# Patient Record
Sex: FEMALE | Race: WHITE | NOT HISPANIC OR LATINO | URBAN - METROPOLITAN AREA
[De-identification: names, ages, dates, MRNs, and addresses within clinical notes are randomized per-mention and may not be internally consistent; named-entity substitution may affect disease eponyms.]

---
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
## 2017-07-11 ENCOUNTER — OUTPATIENT (OUTPATIENT)
Dept: OUTPATIENT SERVICES | Facility: HOSPITAL | Age: 22
LOS: 1 days | Discharge: HOME | End: 2017-07-11

## 2017-07-19 DIAGNOSIS — F33.1 MAJOR DEPRESSIVE DISORDER, RECURRENT, MODERATE: ICD-10-CM

## 2017-08-09 ENCOUNTER — APPOINTMENT (OUTPATIENT)
Dept: UROLOGY | Facility: CLINIC | Age: 22
End: 2017-08-09
Payer: COMMERCIAL

## 2017-08-09 VITALS
OXYGEN SATURATION: 96 % | DIASTOLIC BLOOD PRESSURE: 62 MMHG | BODY MASS INDEX: 29.02 KG/M2 | HEART RATE: 87 BPM | HEIGHT: 64 IN | SYSTOLIC BLOOD PRESSURE: 98 MMHG | WEIGHT: 170 LBS

## 2017-08-09 DIAGNOSIS — N39.44 NOCTURNAL ENURESIS: ICD-10-CM

## 2017-08-09 DIAGNOSIS — Z72.3 LACK OF PHYSICAL EXERCISE: ICD-10-CM

## 2017-08-09 DIAGNOSIS — R35.1 NOCTURIA: ICD-10-CM

## 2017-08-09 DIAGNOSIS — E28.2 POLYCYSTIC OVARIAN SYNDROME: ICD-10-CM

## 2017-08-09 PROCEDURE — 99203 OFFICE O/P NEW LOW 30 MIN: CPT

## 2017-08-11 LAB
APPEARANCE: CLEAR
BACTERIA: NEGATIVE
BILIRUBIN URINE: NEGATIVE
BLOOD URINE: NEGATIVE
COLOR: YELLOW
GLUCOSE QUALITATIVE U: NORMAL MG/DL
HYALINE CASTS: 0 /LPF
KETONES URINE: NEGATIVE
LEUKOCYTE ESTERASE URINE: NEGATIVE
MICROSCOPIC-UA: NORMAL
NITRITE URINE: NEGATIVE
PH URINE: 8
PROTEIN URINE: ABNORMAL MG/DL
RED BLOOD CELLS URINE: 4 /HPF
SPECIFIC GRAVITY URINE: 1.01
SQUAMOUS EPITHELIAL CELLS: 1 /HPF
UROBILINOGEN URINE: NORMAL MG/DL
WHITE BLOOD CELLS URINE: 1 /HPF

## 2017-08-14 ENCOUNTER — RESULT REVIEW (OUTPATIENT)
Age: 22
End: 2017-08-14

## 2017-08-28 ENCOUNTER — APPOINTMENT (OUTPATIENT)
Dept: DERMATOLOGY | Facility: CLINIC | Age: 22
End: 2017-08-28
Payer: COMMERCIAL

## 2017-08-28 VITALS
DIASTOLIC BLOOD PRESSURE: 60 MMHG | BODY MASS INDEX: 29.02 KG/M2 | SYSTOLIC BLOOD PRESSURE: 102 MMHG | HEIGHT: 64 IN | WEIGHT: 170 LBS

## 2017-08-28 DIAGNOSIS — F41.8 OTHER SPECIFIED ANXIETY DISORDERS: ICD-10-CM

## 2017-08-28 DIAGNOSIS — L30.9 DERMATITIS, UNSPECIFIED: ICD-10-CM

## 2017-08-28 DIAGNOSIS — Z87.42 PERSONAL HISTORY OF OTHER DISEASES OF THE FEMALE GENITAL TRACT: ICD-10-CM

## 2017-08-28 PROCEDURE — 99243 OFF/OP CNSLTJ NEW/EST LOW 30: CPT | Mod: GC

## 2017-09-04 ENCOUNTER — TRANSCRIPTION ENCOUNTER (OUTPATIENT)
Age: 22
End: 2017-09-04

## 2017-09-25 ENCOUNTER — LABORATORY RESULT (OUTPATIENT)
Age: 22
End: 2017-09-25

## 2017-09-25 ENCOUNTER — RESULT REVIEW (OUTPATIENT)
Age: 22
End: 2017-09-25

## 2017-09-26 ENCOUNTER — APPOINTMENT (OUTPATIENT)
Dept: DERMATOLOGY | Facility: CLINIC | Age: 22
End: 2017-09-26
Payer: COMMERCIAL

## 2017-09-26 PROCEDURE — 12011 RPR F/E/E/N/L/M 2.5 CM/<: CPT | Mod: 59

## 2017-09-26 PROCEDURE — 11442 EXC FACE-MM B9+MARG 1.1-2 CM: CPT

## 2017-09-29 ENCOUNTER — TRANSCRIPTION ENCOUNTER (OUTPATIENT)
Age: 22
End: 2017-09-29

## 2017-10-05 ENCOUNTER — APPOINTMENT (OUTPATIENT)
Dept: SURGERY | Facility: CLINIC | Age: 22
End: 2017-10-05
Payer: COMMERCIAL

## 2017-10-05 VITALS
SYSTOLIC BLOOD PRESSURE: 100 MMHG | HEIGHT: 64 IN | TEMPERATURE: 99.1 F | BODY MASS INDEX: 29.02 KG/M2 | DIASTOLIC BLOOD PRESSURE: 67 MMHG | OXYGEN SATURATION: 99 % | RESPIRATION RATE: 16 BRPM | HEART RATE: 93 BPM | WEIGHT: 170 LBS

## 2017-10-05 PROCEDURE — 10081 I&D PILONIDAL CYST COMP: CPT

## 2017-10-05 PROCEDURE — 12021 TX SUPFC WND DEHSN W/PACKING: CPT | Mod: 59

## 2017-10-05 PROCEDURE — 99243 OFF/OP CNSLTJ NEW/EST LOW 30: CPT | Mod: 25

## 2017-10-05 RX ORDER — ERGOCALCIFEROL 1.25 MG/1
1.25 MG CAPSULE, LIQUID FILLED ORAL
Qty: 4 | Refills: 0 | Status: DISCONTINUED | COMMUNITY
Start: 2017-07-13 | End: 2017-10-05

## 2017-10-05 RX ORDER — CALCIUM CARBONATE 500 MG/1
TABLET, CHEWABLE ORAL
Refills: 0 | Status: DISCONTINUED | COMMUNITY
End: 2017-10-05

## 2017-10-05 RX ORDER — ERGOCALCIFEROL 1.25 MG/1
1.25 MG CAPSULE ORAL
Refills: 0 | Status: DISCONTINUED | COMMUNITY
End: 2017-10-05

## 2017-10-05 RX ORDER — DOXYCYCLINE HYCLATE 100 MG/1
100 TABLET ORAL
Qty: 60 | Refills: 0 | Status: DISCONTINUED | COMMUNITY
Start: 2017-06-29 | End: 2017-10-05

## 2017-10-05 RX ORDER — METHYLPHENIDATE HYDROCHLORIDE 10 MG/1
10 TABLET ORAL
Refills: 0 | Status: DISCONTINUED | COMMUNITY
End: 2017-10-05

## 2017-10-05 RX ORDER — CLOZAPINE 25 MG/1
25 TABLET ORAL
Qty: 15 | Refills: 0 | Status: DISCONTINUED | COMMUNITY
Start: 2017-07-11 | End: 2017-10-05

## 2017-10-05 RX ORDER — ERYTHROMYCIN AND BENZOYL PEROXIDE 3 %-5 %
5-3 KIT TOPICAL
Qty: 47 | Refills: 0 | Status: DISCONTINUED | COMMUNITY
Start: 2017-08-01 | End: 2017-10-05

## 2017-10-05 RX ORDER — TRETINOIN 0.25 MG/G
0.03 CREAM TOPICAL
Qty: 45 | Refills: 0 | Status: DISCONTINUED | COMMUNITY
Start: 2017-06-30 | End: 2017-10-05

## 2017-10-05 RX ORDER — BISACODYL 5 MG/1
5 TABLET, DELAYED RELEASE ORAL
Refills: 0 | Status: DISCONTINUED | COMMUNITY
End: 2017-10-05

## 2017-10-05 RX ORDER — TRAZODONE HYDROCHLORIDE 100 MG/1
100 TABLET ORAL
Qty: 90 | Refills: 0 | Status: DISCONTINUED | COMMUNITY
Start: 2017-07-14 | End: 2017-10-05

## 2017-10-05 RX ORDER — MULTIVITAMIN
TABLET ORAL
Refills: 0 | Status: DISCONTINUED | COMMUNITY
End: 2017-10-05

## 2017-10-05 RX ORDER — DOCUSATE SODIUM 100 MG/1
100 CAPSULE, LIQUID FILLED ORAL
Refills: 0 | Status: DISCONTINUED | COMMUNITY
End: 2017-10-05

## 2017-10-05 RX ORDER — SULFACETAMIDE SODIUM/UREA 10 %-10 %
LOTION (GRAM) TOPICAL
Refills: 0 | Status: DISCONTINUED | COMMUNITY
End: 2017-10-05

## 2017-10-05 RX ORDER — CLOZAPINE 100 MG/1
100 TABLET ORAL
Qty: 45 | Refills: 0 | Status: DISCONTINUED | COMMUNITY
Start: 2017-07-11 | End: 2017-10-05

## 2017-10-05 RX ORDER — LUBIPROSTONE 24 UG/1
24 CAPSULE, GELATIN COATED ORAL
Qty: 60 | Refills: 0 | Status: DISCONTINUED | COMMUNITY
Start: 2017-06-30 | End: 2017-10-05

## 2017-10-05 RX ORDER — HYDROCORTISONE 25 MG/G
2.5 OINTMENT TOPICAL
Qty: 1 | Refills: 3 | Status: DISCONTINUED | COMMUNITY
Start: 2017-08-28 | End: 2017-10-05

## 2017-10-06 ENCOUNTER — OUTPATIENT (OUTPATIENT)
Dept: OUTPATIENT SERVICES | Facility: HOSPITAL | Age: 22
LOS: 1 days | Discharge: HOME | End: 2017-10-06

## 2017-10-06 DIAGNOSIS — F33.1 MAJOR DEPRESSIVE DISORDER, RECURRENT, MODERATE: ICD-10-CM

## 2017-10-19 ENCOUNTER — APPOINTMENT (OUTPATIENT)
Dept: SURGERY | Facility: CLINIC | Age: 22
End: 2017-10-19
Payer: COMMERCIAL

## 2017-10-19 VITALS
SYSTOLIC BLOOD PRESSURE: 109 MMHG | RESPIRATION RATE: 15 BRPM | TEMPERATURE: 98.3 F | HEART RATE: 100 BPM | OXYGEN SATURATION: 100 % | DIASTOLIC BLOOD PRESSURE: 75 MMHG

## 2017-10-19 PROCEDURE — 99213 OFFICE O/P EST LOW 20 MIN: CPT

## 2017-11-16 ENCOUNTER — APPOINTMENT (OUTPATIENT)
Dept: SURGERY | Facility: CLINIC | Age: 22
End: 2017-11-16
Payer: COMMERCIAL

## 2017-11-16 VITALS
TEMPERATURE: 98.7 F | HEART RATE: 102 BPM | OXYGEN SATURATION: 98 % | DIASTOLIC BLOOD PRESSURE: 71 MMHG | RESPIRATION RATE: 16 BRPM | SYSTOLIC BLOOD PRESSURE: 101 MMHG

## 2017-11-16 DIAGNOSIS — L05.01 PILONIDAL CYST WITH ABSCESS: ICD-10-CM

## 2017-11-16 PROCEDURE — 99213 OFFICE O/P EST LOW 20 MIN: CPT

## 2017-11-16 RX ORDER — ONDANSETRON 4 MG/1
4 TABLET ORAL
Qty: 30 | Refills: 0 | Status: DISCONTINUED | COMMUNITY
Start: 2017-08-29

## 2017-11-16 RX ORDER — AMOXICILLIN AND CLAVULANATE POTASSIUM 875; 125 MG/1; MG/1
875-125 TABLET, COATED ORAL
Qty: 20 | Refills: 0 | Status: DISCONTINUED | COMMUNITY
Start: 2017-09-02

## 2017-11-16 RX ORDER — METHYLPHENIDATE HYDROCHLORIDE 5 MG/1
5 TABLET ORAL
Qty: 30 | Refills: 0 | Status: DISCONTINUED | COMMUNITY
Start: 2017-08-22

## 2017-11-16 RX ORDER — IBUPROFEN 800 MG/1
800 TABLET, FILM COATED ORAL
Qty: 6 | Refills: 0 | Status: DISCONTINUED | COMMUNITY
Start: 2017-09-02

## 2017-11-16 RX ORDER — SODIUM SULFACETAMIDE 100 MG/ML
10 LOTION TOPICAL
Qty: 118 | Refills: 0 | Status: DISCONTINUED | COMMUNITY
Start: 2017-08-15

## 2017-11-16 RX ORDER — OXYCODONE AND ACETAMINOPHEN 5; 325 MG/1; MG/1
5-325 TABLET ORAL
Qty: 8 | Refills: 0 | Status: DISCONTINUED | COMMUNITY
Start: 2017-09-02

## 2017-11-16 RX ORDER — SULFAMETHOXAZOLE AND TRIMETHOPRIM 800; 160 MG/1; MG/1
800-160 TABLET ORAL
Qty: 14 | Refills: 0 | Status: DISCONTINUED | COMMUNITY
Start: 2017-09-02

## 2018-02-27 ENCOUNTER — APPOINTMENT (OUTPATIENT)
Dept: DERMATOLOGY | Facility: CLINIC | Age: 23
End: 2018-02-27
Payer: COMMERCIAL

## 2018-02-27 VITALS — DIASTOLIC BLOOD PRESSURE: 78 MMHG | SYSTOLIC BLOOD PRESSURE: 110 MMHG

## 2018-02-27 PROCEDURE — 99214 OFFICE O/P EST MOD 30 MIN: CPT

## 2018-05-29 ENCOUNTER — APPOINTMENT (OUTPATIENT)
Dept: DERMATOLOGY | Facility: CLINIC | Age: 23
End: 2018-05-29
Payer: COMMERCIAL

## 2018-05-29 VITALS — SYSTOLIC BLOOD PRESSURE: 110 MMHG | DIASTOLIC BLOOD PRESSURE: 68 MMHG

## 2018-05-29 PROCEDURE — 99213 OFFICE O/P EST LOW 20 MIN: CPT | Mod: 25,GC

## 2018-05-29 PROCEDURE — 11900 INJECT SKIN LESIONS </W 7: CPT | Mod: GC

## 2018-08-08 ENCOUNTER — APPOINTMENT (OUTPATIENT)
Dept: DERMATOLOGY | Facility: CLINIC | Age: 23
End: 2018-08-08

## 2018-08-10 ENCOUNTER — APPOINTMENT (OUTPATIENT)
Dept: DERMATOLOGY | Facility: CLINIC | Age: 23
End: 2018-08-10
Payer: COMMERCIAL

## 2018-08-10 VITALS — DIASTOLIC BLOOD PRESSURE: 64 MMHG | SYSTOLIC BLOOD PRESSURE: 106 MMHG

## 2018-08-10 PROCEDURE — 99213 OFFICE O/P EST LOW 20 MIN: CPT

## 2018-08-14 ENCOUNTER — APPOINTMENT (OUTPATIENT)
Dept: ORTHOPEDIC SURGERY | Facility: CLINIC | Age: 23
End: 2018-08-14
Payer: COMMERCIAL

## 2018-08-14 VITALS
DIASTOLIC BLOOD PRESSURE: 74 MMHG | BODY MASS INDEX: 27.63 KG/M2 | SYSTOLIC BLOOD PRESSURE: 107 MMHG | HEART RATE: 111 BPM | WEIGHT: 154 LBS | HEIGHT: 62.5 IN

## 2018-08-14 DIAGNOSIS — M54.9 DORSALGIA, UNSPECIFIED: ICD-10-CM

## 2018-08-14 DIAGNOSIS — F31.9 BIPOLAR DISORDER, UNSPECIFIED: ICD-10-CM

## 2018-08-14 DIAGNOSIS — Z87.19 PERSONAL HISTORY OF OTHER DISEASES OF THE DIGESTIVE SYSTEM: ICD-10-CM

## 2018-08-14 DIAGNOSIS — Z86.39 PERSONAL HISTORY OF OTHER ENDOCRINE, NUTRITIONAL AND METABOLIC DISEASE: ICD-10-CM

## 2018-08-14 DIAGNOSIS — M41.124 ADOLESCENT IDIOPATHIC SCOLIOSIS, THORACIC REGION: ICD-10-CM

## 2018-08-14 DIAGNOSIS — M40.209 UNSPECIFIED KYPHOSIS, SITE UNSPECIFIED: ICD-10-CM

## 2018-08-14 PROCEDURE — 99204 OFFICE O/P NEW MOD 45 MIN: CPT

## 2018-08-14 PROCEDURE — 72082 X-RAY EXAM ENTIRE SPI 2/3 VW: CPT

## 2018-09-04 ENCOUNTER — APPOINTMENT (OUTPATIENT)
Dept: DERMATOLOGY | Facility: CLINIC | Age: 23
End: 2018-09-04

## 2018-10-08 ENCOUNTER — APPOINTMENT (OUTPATIENT)
Dept: DERMATOLOGY | Facility: CLINIC | Age: 23
End: 2018-10-08
Payer: COMMERCIAL

## 2018-10-08 PROCEDURE — 99213 OFFICE O/P EST LOW 20 MIN: CPT

## 2018-12-03 ENCOUNTER — APPOINTMENT (OUTPATIENT)
Dept: DERMATOLOGY | Facility: CLINIC | Age: 23
End: 2018-12-03

## 2018-12-28 ENCOUNTER — APPOINTMENT (OUTPATIENT)
Dept: DERMATOLOGY | Facility: CLINIC | Age: 23
End: 2018-12-28
Payer: COMMERCIAL

## 2018-12-28 VITALS — SYSTOLIC BLOOD PRESSURE: 90 MMHG | DIASTOLIC BLOOD PRESSURE: 60 MMHG

## 2018-12-28 PROCEDURE — 99214 OFFICE O/P EST MOD 30 MIN: CPT

## 2019-01-07 ENCOUNTER — LABORATORY RESULT (OUTPATIENT)
Age: 24
End: 2019-01-07

## 2019-01-07 ENCOUNTER — APPOINTMENT (OUTPATIENT)
Dept: DERMATOLOGY | Facility: CLINIC | Age: 24
End: 2019-01-07
Payer: COMMERCIAL

## 2019-01-07 PROCEDURE — 11421 EXC H-F-NK-SP B9+MARG 0.6-1: CPT

## 2019-01-07 PROCEDURE — 12031 INTMD RPR S/A/T/EXT 2.5 CM/<: CPT

## 2019-01-08 NOTE — PHYSICAL EXAM
[Alert] : alert [Oriented x 3] : ~L oriented x 3 [Well Nourished] : well nourished [Conjunctiva Non-injected] : conjunctiva non-injected [No Visual Lymphadenopathy] : no visual  lymphadenopathy [No Clubbing] : no clubbing [No Edema] : no edema [No Bromhidrosis] : no bromhidrosis [No Chromhidrosis] : no chromhidrosis [FreeTextEntry3] : right frontal scalp: 0.5cm x 1cm scar\par \par

## 2019-01-08 NOTE — HISTORY OF PRESENT ILLNESS
[FreeTextEntry1] : f/u cyst on scalp [de-identified] : Date of surgery: 01/07/2019\par \par 24 yo F with bipolar disorder on lithium and PCOS on OCPs here today for repeat excision of cyst on R frontal scalp.  Had excision by Dr. Zhou in September 2017. Now w/ recurrence, per pt.  No associated signs or symptoms.  Desires removal. \par  \par Pertinent positives noted below\par History of HIV or hepatitis: N\par Blood thinners: N\par Antibiotic Prophylaxis: N \par Medical implants: N\par The patient's ROS questionnaire was reviewed. Education needs were identified. There were no barriers to learning.\par \par \par

## 2019-01-14 ENCOUNTER — APPOINTMENT (OUTPATIENT)
Dept: DERMATOLOGY | Facility: CLINIC | Age: 24
End: 2019-01-14
Payer: COMMERCIAL

## 2019-01-14 PROCEDURE — 99024 POSTOP FOLLOW-UP VISIT: CPT

## 2019-01-14 NOTE — HISTORY OF PRESENT ILLNESS
[FreeTextEntry1] : SR s/p recurrent cyst on scalp  [de-identified] : Date of surgery: 01/07/2019\par \par 22 yo F with bipolar disorder on lithium and PCOS on OCPs here today for SR s/p repeat excision of "cyst" on R frontal scalp. Path c/w scar. Initial excision by Dr. Zhou in September 2017.\par  \par Pertinent positives noted below\par History of HIV or hepatitis: N\par Blood thinners: N\par Antibiotic Prophylaxis: N \par Medical implants: N\par The patient's ROS questionnaire was reviewed. Education needs were identified. There were no barriers to learning.

## 2019-01-14 NOTE — PHYSICAL EXAM
[Alert] : alert [Oriented x 3] : ~L oriented x 3 [Well Nourished] : well nourished [Conjunctiva Non-injected] : conjunctiva non-injected [No Visual Lymphadenopathy] : no visual  lymphadenopathy [No Clubbing] : no clubbing [No Edema] : no edema [No Bromhidrosis] : no bromhidrosis [No Chromhidrosis] : no chromhidrosis [FreeTextEntry3] : right frontal scalp: 0.5cm x 1cm scar. sutures intact\par \par

## 2019-02-12 ENCOUNTER — APPOINTMENT (OUTPATIENT)
Dept: DERMATOLOGY | Facility: CLINIC | Age: 24
End: 2019-02-12
Payer: COMMERCIAL

## 2019-02-12 DIAGNOSIS — L72.9 FOLLICULAR CYST OF THE SKIN AND SUBCUTANEOUS TISSUE, UNSPECIFIED: ICD-10-CM

## 2019-02-12 DIAGNOSIS — T81.30XA DISRUPTION OF WOUND, UNSPECIFIED, INITIAL ENCOUNTER: ICD-10-CM

## 2019-02-12 PROCEDURE — 99213 OFFICE O/P EST LOW 20 MIN: CPT

## 2019-03-14 ENCOUNTER — APPOINTMENT (OUTPATIENT)
Dept: DERMATOLOGY | Facility: CLINIC | Age: 24
End: 2019-03-14
Payer: COMMERCIAL

## 2019-03-14 VITALS — SYSTOLIC BLOOD PRESSURE: 100 MMHG | DIASTOLIC BLOOD PRESSURE: 64 MMHG

## 2019-03-14 PROCEDURE — 99213 OFFICE O/P EST LOW 20 MIN: CPT

## 2019-03-15 ENCOUNTER — TRANSCRIPTION ENCOUNTER (OUTPATIENT)
Age: 24
End: 2019-03-15

## 2019-04-02 ENCOUNTER — APPOINTMENT (OUTPATIENT)
Dept: DERMATOLOGY | Facility: CLINIC | Age: 24
End: 2019-04-02
Payer: COMMERCIAL

## 2019-04-02 DIAGNOSIS — L90.5 SCAR CONDITIONS AND FIBROSIS OF SKIN: ICD-10-CM

## 2019-04-02 PROCEDURE — 99213 OFFICE O/P EST LOW 20 MIN: CPT

## 2019-04-16 ENCOUNTER — APPOINTMENT (OUTPATIENT)
Dept: DERMATOLOGY | Facility: CLINIC | Age: 24
End: 2019-04-16
Payer: COMMERCIAL

## 2019-04-16 VITALS — DIASTOLIC BLOOD PRESSURE: 70 MMHG | SYSTOLIC BLOOD PRESSURE: 110 MMHG

## 2019-04-16 PROCEDURE — 99213 OFFICE O/P EST LOW 20 MIN: CPT

## 2019-04-16 NOTE — PHYSICAL EXAM
[Alert] : alert [Oriented x 3] : ~L oriented x 3 [Well Nourished] : well nourished [Conjunctiva Non-injected] : conjunctiva non-injected [No Visual Lymphadenopathy] : no visual  lymphadenopathy [No Clubbing] : no clubbing [No Edema] : no edema [No Bromhidrosis] : no bromhidrosis [No Chromhidrosis] : no chromhidrosis [FreeTextEntry3] : open and closed comedones on the face with few inflammatory papules on the jawline (L>R)\par \par Few erythematous papules on the chest\par \par \par \par

## 2019-04-16 NOTE — HISTORY OF PRESENT ILLNESS
[FreeTextEntry1] : f/u acne [de-identified] : 22 yo F with bipolar disorder on lithium and PCOS on OCPs here today for f/u of acne. Improved overall with tretinoin but has recently been getting bumps on the jawline despite regular use of tretinoin. Was on Dapsone topically but stopped as she felt it was making her skin dry in the winter time. Was previously on spironolactone as well. Otherwise, no new, evolving, or symptomatic skin lesions. \par \par \par Acne history: Previously treated with PO doxycycline with resolution\par

## 2019-07-16 ENCOUNTER — APPOINTMENT (OUTPATIENT)
Age: 24
End: 2019-07-16
Payer: COMMERCIAL

## 2019-07-16 VITALS
BODY MASS INDEX: 26.63 KG/M2 | SYSTOLIC BLOOD PRESSURE: 96 MMHG | DIASTOLIC BLOOD PRESSURE: 60 MMHG | WEIGHT: 156 LBS | HEIGHT: 64 IN

## 2019-07-16 PROCEDURE — 99213 OFFICE O/P EST LOW 20 MIN: CPT

## 2019-07-16 NOTE — HISTORY OF PRESENT ILLNESS
[FreeTextEntry1] : f/u acne [de-identified] : 25 yo F with bipolar disorder on lithium and PCOS on OCPs here today for f/u of acne. Since LV, re-started topical dapsone with improvement. Now reports essential clearance of her acne on her face and chest. Otherwise only using BPO and clindamycin. Not using tretinoin as she felt it made her skin greasy. \par \par \par Acne history: Previously treated with PO doxycycline with resolution\par

## 2019-07-16 NOTE — PHYSICAL EXAM
[Alert] : alert [Oriented x 3] : ~L oriented x 3 [Well Nourished] : well nourished [Conjunctiva Non-injected] : conjunctiva non-injected [No Visual Lymphadenopathy] : no visual  lymphadenopathy [No Clubbing] : no clubbing [No Edema] : no edema [No Bromhidrosis] : no bromhidrosis [No Chromhidrosis] : no chromhidrosis [FreeTextEntry3] : Face and chest clear

## 2020-01-14 ENCOUNTER — APPOINTMENT (OUTPATIENT)
Dept: DERMATOLOGY | Facility: CLINIC | Age: 25
End: 2020-01-14
Payer: COMMERCIAL

## 2020-01-14 PROCEDURE — 99213 OFFICE O/P EST LOW 20 MIN: CPT

## 2020-02-24 ENCOUNTER — RX RENEWAL (OUTPATIENT)
Age: 25
End: 2020-02-24

## 2020-05-01 ENCOUNTER — OUTPATIENT (OUTPATIENT)
Dept: OUTPATIENT SERVICES | Facility: HOSPITAL | Age: 25
LOS: 1 days | End: 2020-05-01
Payer: MEDICAID

## 2020-05-01 PROCEDURE — G9001: CPT

## 2020-05-22 ENCOUNTER — TRANSCRIPTION ENCOUNTER (OUTPATIENT)
Age: 25
End: 2020-05-22

## 2020-05-22 ENCOUNTER — INPATIENT (INPATIENT)
Facility: HOSPITAL | Age: 25
LOS: 1 days | Discharge: HOME | End: 2020-05-24
Attending: INTERNAL MEDICINE | Admitting: INTERNAL MEDICINE
Payer: COMMERCIAL

## 2020-05-22 ENCOUNTER — APPOINTMENT (OUTPATIENT)
Dept: OBGYN | Facility: CLINIC | Age: 25
End: 2020-05-22
Payer: COMMERCIAL

## 2020-05-22 VITALS
RESPIRATION RATE: 18 BRPM | SYSTOLIC BLOOD PRESSURE: 103 MMHG | TEMPERATURE: 98 F | HEART RATE: 97 BPM | DIASTOLIC BLOOD PRESSURE: 70 MMHG | OXYGEN SATURATION: 100 %

## 2020-05-22 VITALS
BODY MASS INDEX: 29.02 KG/M2 | HEIGHT: 64 IN | TEMPERATURE: 98.1 F | WEIGHT: 170 LBS | OXYGEN SATURATION: 98 % | SYSTOLIC BLOOD PRESSURE: 120 MMHG | HEART RATE: 105 BPM | DIASTOLIC BLOOD PRESSURE: 76 MMHG

## 2020-05-22 DIAGNOSIS — Z01.419 ENCOUNTER FOR GYNECOLOGICAL EXAMINATION (GENERAL) (ROUTINE) W/OUT ABNORMAL FINDINGS: ICD-10-CM

## 2020-05-22 DIAGNOSIS — R10.2 PELVIC AND PERINEAL PAIN: ICD-10-CM

## 2020-05-22 DIAGNOSIS — K81.0 ACUTE CHOLECYSTITIS: ICD-10-CM

## 2020-05-22 LAB
ALBUMIN SERPL ELPH-MCNC: 4 G/DL — SIGNIFICANT CHANGE UP (ref 3.3–5)
ALP SERPL-CCNC: 64 U/L — SIGNIFICANT CHANGE UP (ref 40–120)
ALT FLD-CCNC: 14 U/L — SIGNIFICANT CHANGE UP (ref 4–33)
ANION GAP SERPL CALC-SCNC: 12 MMO/L — SIGNIFICANT CHANGE UP (ref 7–14)
APPEARANCE UR: CLEAR — SIGNIFICANT CHANGE UP
AST SERPL-CCNC: 18 U/L — SIGNIFICANT CHANGE UP (ref 4–32)
BACTERIA # UR AUTO: SIGNIFICANT CHANGE UP
BASOPHILS # BLD AUTO: 0.05 K/UL — SIGNIFICANT CHANGE UP (ref 0–0.2)
BASOPHILS NFR BLD AUTO: 0.4 % — SIGNIFICANT CHANGE UP (ref 0–2)
BILIRUB SERPL-MCNC: 0.2 MG/DL — SIGNIFICANT CHANGE UP (ref 0.2–1.2)
BILIRUB UR-MCNC: NEGATIVE — SIGNIFICANT CHANGE UP
BLD GP AB SCN SERPL QL: NEGATIVE — SIGNIFICANT CHANGE UP
BLOOD UR QL VISUAL: NEGATIVE — SIGNIFICANT CHANGE UP
BUN SERPL-MCNC: 10 MG/DL — SIGNIFICANT CHANGE UP (ref 7–23)
CALCIUM SERPL-MCNC: 9.5 MG/DL — SIGNIFICANT CHANGE UP (ref 8.4–10.5)
CHLORIDE SERPL-SCNC: 102 MMOL/L — SIGNIFICANT CHANGE UP (ref 98–107)
CO2 SERPL-SCNC: 22 MMOL/L — SIGNIFICANT CHANGE UP (ref 22–31)
COLOR SPEC: YELLOW — SIGNIFICANT CHANGE UP
CREAT SERPL-MCNC: 0.85 MG/DL — SIGNIFICANT CHANGE UP (ref 0.5–1.3)
D DIMER BLD IA.RAPID-MCNC: 934 NG/ML — SIGNIFICANT CHANGE UP
EOSINOPHIL # BLD AUTO: 0.14 K/UL — SIGNIFICANT CHANGE UP (ref 0–0.5)
EOSINOPHIL NFR BLD AUTO: 1 % — SIGNIFICANT CHANGE UP (ref 0–6)
GLUCOSE SERPL-MCNC: 91 MG/DL — SIGNIFICANT CHANGE UP (ref 70–99)
GLUCOSE UR-MCNC: NEGATIVE — SIGNIFICANT CHANGE UP
HCT VFR BLD CALC: 35.6 % — SIGNIFICANT CHANGE UP (ref 34.5–45)
HGB BLD-MCNC: 12.3 G/DL — SIGNIFICANT CHANGE UP (ref 11.5–15.5)
HYALINE CASTS # UR AUTO: NEGATIVE — SIGNIFICANT CHANGE UP
IMM GRANULOCYTES NFR BLD AUTO: 0.7 % — SIGNIFICANT CHANGE UP (ref 0–1.5)
KETONES UR-MCNC: NEGATIVE — SIGNIFICANT CHANGE UP
LEUKOCYTE ESTERASE UR-ACNC: NEGATIVE — SIGNIFICANT CHANGE UP
LIDOCAIN IGE QN: 14.5 U/L — SIGNIFICANT CHANGE UP (ref 7–60)
LYMPHOCYTES # BLD AUTO: 2.71 K/UL — SIGNIFICANT CHANGE UP (ref 1–3.3)
LYMPHOCYTES # BLD AUTO: 20.2 % — SIGNIFICANT CHANGE UP (ref 13–44)
MCHC RBC-ENTMCNC: 31.9 PG — SIGNIFICANT CHANGE UP (ref 27–34)
MCHC RBC-ENTMCNC: 34.6 % — SIGNIFICANT CHANGE UP (ref 32–36)
MCV RBC AUTO: 92.2 FL — SIGNIFICANT CHANGE UP (ref 80–100)
MONOCYTES # BLD AUTO: 0.73 K/UL — SIGNIFICANT CHANGE UP (ref 0–0.9)
MONOCYTES NFR BLD AUTO: 5.5 % — SIGNIFICANT CHANGE UP (ref 2–14)
NEUTROPHILS # BLD AUTO: 9.67 K/UL — HIGH (ref 1.8–7.4)
NEUTROPHILS NFR BLD AUTO: 72.2 % — SIGNIFICANT CHANGE UP (ref 43–77)
NITRITE UR-MCNC: NEGATIVE — SIGNIFICANT CHANGE UP
NRBC # FLD: 0 K/UL — SIGNIFICANT CHANGE UP (ref 0–0)
PH UR: 7 — SIGNIFICANT CHANGE UP (ref 5–8)
PLATELET # BLD AUTO: 262 K/UL — SIGNIFICANT CHANGE UP (ref 150–400)
PMV BLD: 10.4 FL — SIGNIFICANT CHANGE UP (ref 7–13)
POTASSIUM SERPL-MCNC: 3.7 MMOL/L — SIGNIFICANT CHANGE UP (ref 3.5–5.3)
POTASSIUM SERPL-SCNC: 3.7 MMOL/L — SIGNIFICANT CHANGE UP (ref 3.5–5.3)
PROT SERPL-MCNC: 7.8 G/DL — SIGNIFICANT CHANGE UP (ref 6–8.3)
PROT UR-MCNC: 30 — SIGNIFICANT CHANGE UP
RBC # BLD: 3.86 M/UL — SIGNIFICANT CHANGE UP (ref 3.8–5.2)
RBC # FLD: 12.9 % — SIGNIFICANT CHANGE UP (ref 10.3–14.5)
RBC CASTS # UR COMP ASSIST: SIGNIFICANT CHANGE UP (ref 0–?)
RH IG SCN BLD-IMP: POSITIVE — SIGNIFICANT CHANGE UP
SARS-COV-2 RNA SPEC QL NAA+PROBE: SIGNIFICANT CHANGE UP
SODIUM SERPL-SCNC: 136 MMOL/L — SIGNIFICANT CHANGE UP (ref 135–145)
SP GR SPEC: 1.01 — SIGNIFICANT CHANGE UP (ref 1–1.04)
SQUAMOUS # UR AUTO: SIGNIFICANT CHANGE UP
UROBILINOGEN FLD QL: NORMAL — SIGNIFICANT CHANGE UP
WBC # BLD: 13.39 K/UL — HIGH (ref 3.8–10.5)
WBC # FLD AUTO: 13.39 K/UL — HIGH (ref 3.8–10.5)
WBC UR QL: SIGNIFICANT CHANGE UP (ref 0–?)

## 2020-05-22 PROCEDURE — 76700 US EXAM ABDOM COMPLETE: CPT | Mod: 26

## 2020-05-22 PROCEDURE — 71046 X-RAY EXAM CHEST 2 VIEWS: CPT | Mod: 26

## 2020-05-22 PROCEDURE — 99385 PREV VISIT NEW AGE 18-39: CPT

## 2020-05-22 PROCEDURE — 71275 CT ANGIOGRAPHY CHEST: CPT | Mod: 26

## 2020-05-22 RX ORDER — TRETINOIN 0.5 MG/G
0.05 CREAM TOPICAL
Qty: 1 | Refills: 6 | Status: DISCONTINUED | COMMUNITY
Start: 2018-02-27 | End: 2020-05-22

## 2020-05-22 RX ORDER — ENOXAPARIN SODIUM 100 MG/ML
40 INJECTION SUBCUTANEOUS DAILY
Refills: 0 | Status: DISCONTINUED | OUTPATIENT
Start: 2020-05-22 | End: 2020-05-24

## 2020-05-22 RX ORDER — DOCUSATE SODIUM 100 MG/1
CAPSULE ORAL
Refills: 0 | Status: DISCONTINUED | COMMUNITY
End: 2020-05-22

## 2020-05-22 RX ORDER — BUPROPION HYDROCHLORIDE 150 MG/1
150 TABLET, EXTENDED RELEASE ORAL DAILY
Refills: 0 | Status: DISCONTINUED | OUTPATIENT
Start: 2020-05-22 | End: 2020-05-24

## 2020-05-22 RX ORDER — OMEPRAZOLE 40 MG/1
40 CAPSULE, DELAYED RELEASE ORAL
Refills: 0 | Status: DISCONTINUED | COMMUNITY
End: 2020-05-22

## 2020-05-22 RX ORDER — CLOZAPINE 150 MG/1
300 TABLET, ORALLY DISINTEGRATING ORAL AT BEDTIME
Refills: 0 | Status: DISCONTINUED | OUTPATIENT
Start: 2020-05-22 | End: 2020-05-24

## 2020-05-22 RX ORDER — METHYLPHENIDATE HYDROCHLORIDE 10 MG/1
10 TABLET ORAL
Refills: 0 | Status: DISCONTINUED | COMMUNITY
End: 2020-05-22

## 2020-05-22 RX ORDER — BACITRACIN ZINC 500 [USP'U]/G
500 OINTMENT TOPICAL
Qty: 30 | Refills: 3 | Status: DISCONTINUED | COMMUNITY
Start: 2017-10-19 | End: 2020-05-22

## 2020-05-22 RX ORDER — ERGOCALCIFEROL 1.25 MG/1
1.25 MG CAPSULE ORAL
Refills: 0 | Status: DISCONTINUED | COMMUNITY
End: 2020-05-22

## 2020-05-22 RX ORDER — TRAZODONE HYDROCHLORIDE 150 MG/1
150 TABLET ORAL
Qty: 60 | Refills: 0 | Status: DISCONTINUED | COMMUNITY
Start: 2017-07-11 | End: 2020-05-22

## 2020-05-22 RX ORDER — SODIUM CHLORIDE 9 MG/ML
1000 INJECTION, SOLUTION INTRAVENOUS
Refills: 0 | Status: DISCONTINUED | OUTPATIENT
Start: 2020-05-22 | End: 2020-05-23

## 2020-05-22 RX ORDER — CLOZAPINE 150 MG/1
25 TABLET, ORALLY DISINTEGRATING ORAL DAILY
Refills: 0 | Status: DISCONTINUED | OUTPATIENT
Start: 2020-05-22 | End: 2020-05-24

## 2020-05-22 RX ORDER — DEXTROAMPHETAMINE SACCHARATE, AMPHETAMINE ASPARTATE, DEXTROAMPHETAMINE SULFATE AND AMPHETAMINE SULFATE 1.875; 1.875; 1.875; 1.875 MG/1; MG/1; MG/1; MG/1
5 TABLET ORAL DAILY
Refills: 0 | Status: DISCONTINUED | OUTPATIENT
Start: 2020-05-22 | End: 2020-05-24

## 2020-05-22 RX ORDER — CERAMIDE 1,3,6-II/SALICYLIC/B3
CLEANSER (ML) TOPICAL
Qty: 1 | Refills: 2 | Status: DISCONTINUED | COMMUNITY
Start: 2019-03-14 | End: 2020-05-22

## 2020-05-22 RX ORDER — CEFOTETAN DISODIUM 1 G
2 VIAL (EA) INJECTION EVERY 12 HOURS
Refills: 0 | Status: DISCONTINUED | OUTPATIENT
Start: 2020-05-22 | End: 2020-05-23

## 2020-05-22 RX ORDER — EMOLLIENT COMBINATION NO.40
LOTION (GRAM) TOPICAL
Refills: 0 | Status: ACTIVE | COMMUNITY

## 2020-05-22 RX ORDER — LITHIUM CARBONATE 300 MG/1
900 TABLET, EXTENDED RELEASE ORAL AT BEDTIME
Refills: 0 | Status: DISCONTINUED | OUTPATIENT
Start: 2020-05-22 | End: 2020-05-24

## 2020-05-22 RX ORDER — HYDROMORPHONE HYDROCHLORIDE 2 MG/ML
1 INJECTION INTRAMUSCULAR; INTRAVENOUS; SUBCUTANEOUS EVERY 4 HOURS
Refills: 0 | Status: DISCONTINUED | OUTPATIENT
Start: 2020-05-22 | End: 2020-05-23

## 2020-05-22 RX ORDER — CLOZAPINE 200 MG/1
TABLET ORAL
Refills: 0 | Status: DISCONTINUED | COMMUNITY
End: 2020-05-22

## 2020-05-22 RX ORDER — LITHIUM CARBONATE 300 MG/1
300 TABLET, EXTENDED RELEASE ORAL
Refills: 0 | Status: DISCONTINUED | OUTPATIENT
Start: 2020-05-22 | End: 2020-05-24

## 2020-05-22 RX ORDER — FLUVOXAMINE MALEATE 25 MG/1
100 TABLET ORAL AT BEDTIME
Refills: 0 | Status: DISCONTINUED | OUTPATIENT
Start: 2020-05-22 | End: 2020-05-24

## 2020-05-22 RX ORDER — TRIAMCINOLONE ACETONIDE 1 MG/G
0.1 OINTMENT TOPICAL
Qty: 1 | Refills: 1 | Status: DISCONTINUED | COMMUNITY
Start: 2018-12-28 | End: 2020-05-22

## 2020-05-22 RX ORDER — DEXTROAMPHETAMINE SACCHARATE, AMPHETAMINE ASPARTATE, DEXTROAMPHETAMINE SULFATE AND AMPHETAMINE SULFATE 1.875; 1.875; 1.875; 1.875 MG/1; MG/1; MG/1; MG/1
10 TABLET ORAL DAILY
Refills: 0 | Status: DISCONTINUED | OUTPATIENT
Start: 2020-05-22 | End: 2020-05-24

## 2020-05-22 RX ORDER — HYDROMORPHONE HYDROCHLORIDE 2 MG/ML
0.5 INJECTION INTRAMUSCULAR; INTRAVENOUS; SUBCUTANEOUS EVERY 4 HOURS
Refills: 0 | Status: DISCONTINUED | OUTPATIENT
Start: 2020-05-22 | End: 2020-05-24

## 2020-05-22 RX ORDER — BENZOYL PEROXIDE 5 G/100G
5 LIQUID TOPICAL
Qty: 1 | Refills: 11 | Status: DISCONTINUED | COMMUNITY
Start: 2018-02-27 | End: 2020-05-22

## 2020-05-22 RX ORDER — HYDROCORTISONE 1 %
12 CREAM (GRAM) TOPICAL
Qty: 1 | Refills: 2 | Status: DISCONTINUED | COMMUNITY
Start: 2018-12-28 | End: 2020-05-22

## 2020-05-22 RX ORDER — TRAZODONE HCL 50 MG
200 TABLET ORAL AT BEDTIME
Refills: 0 | Status: DISCONTINUED | OUTPATIENT
Start: 2020-05-22 | End: 2020-05-24

## 2020-05-22 RX ORDER — CEFOTETAN DISODIUM 1 G
2 VIAL (EA) INJECTION ONCE
Refills: 0 | Status: COMPLETED | OUTPATIENT
Start: 2020-05-22 | End: 2020-05-22

## 2020-05-22 RX ADMIN — Medication 100 GRAM(S): at 20:53

## 2020-05-22 NOTE — ED PROVIDER NOTE - CLINICAL SUMMARY MEDICAL DECISION MAKING FREE TEXT BOX
pt with pleuritic cp, abdominal pain; low risk PE on OCPs, + murpheys sign on exam  -cbc, cmp, ddimer, ekg, cxr, ua/ucx, ucg, ruq sono

## 2020-05-22 NOTE — REVIEW OF SYSTEMS
[Pelvic Pain] : pelvic pain [Abdominal Pain] : abdominal pain [Feeling Tired] : feeling tired [Nl] : Integumentary

## 2020-05-22 NOTE — ED PROVIDER NOTE - CONSTITUTIONAL, MLM
normal... Flat affect, Otherwise well appearing, awake, alert, oriented to person, place, time/situation and in no apparent distress.

## 2020-05-22 NOTE — ED PROVIDER NOTE - OBJECTIVE STATEMENT
24 y/o F with Bipolar disorder, MDD, borderline PD, h/o incontinence (pt unclear of dx rx'd oxybutynin) c/o b/l pleuritic CP with rios and ruq pain that radiaties to rt flank and rlq x 2 weeks. Associated n/v which resolved 3 days ago. +chills and decreased appetite. +OCP use. Movement worsens sxs. Denies fever, le edema, h/o dvt/pe, sick contacts, diarrhea, cough, recent travel.

## 2020-05-22 NOTE — H&P ADULT - NSHPLABSRESULTS_GEN_ALL_CORE
CBC Full  -  ( 22 May 2020 16:48 )  WBC Count : 13.39 K/uL  RBC Count : 3.86 M/uL  Hemoglobin : 12.3 g/dL  Hematocrit : 35.6 %  Platelet Count - Automated : 262 K/uL  Mean Cell Volume : 92.2 fL  Mean Cell Hemoglobin : 31.9 pg  Mean Cell Hemoglobin Concentration : 34.6 %  Auto Neutrophil # : 9.67 K/uL  Auto Lymphocyte # : 2.71 K/uL  Auto Monocyte # : 0.73 K/uL  Auto Eosinophil # : 0.14 K/uL  Auto Basophil # : 0.05 K/uL  Auto Neutrophil % : 72.2 %  Auto Lymphocyte % : 20.2 %  Auto Monocyte % : 5.5 %  Auto Eosinophil % : 1.0 %  Auto Basophil % : 0.4 %    05-22    136  |  102  |  10  ----------------------------<  91  3.7   |  22  |  0.85    Ca    9.5      22 May 2020 16:48    TPro  7.8  /  Alb  4.0  /  TBili  0.2  /  DBili  x   /  AST  18  /  ALT  14  /  AlkPhos  64  05-22    < from: US Abdomen Complete (05.22.20 @ 17:17) >      INTERPRETATION:  CLINICAL INFORMATION: Abdominal pain for 2 weeks    COMPARISON: None available.    TECHNIQUE: Sonography of the abdomen.     FINDINGS:    Liver: Within normal limits.    Bile ducts: Normal caliber. Common bile duct measures 4 mm.     Gallbladder: Biliary sludge. Gallbladder wall thickening up to 5 mm. No cholelithiasis. Positive sonographic Perez sign.    Pancreas: Visualized portions are within normal limits.    Spleen: 9 cm. Within normal limits.    Right kidney: 12.1 cm. No hydronephrosis.    Left kidney: 10.4 cm.  No hydronephrosis.    Ascites: None.    Aorta and IVC: Visualized portions are within normal limits.    IMPRESSION:     Biliary sludge. Gallbladder wall thickening up to 5 mm. No cholelithiasis. Positive sonographic Perez sign. Findings are equivocal for acute cholecystitis. A HIDA scan can be obtained for further evaluation.      < end of copied text >

## 2020-05-22 NOTE — H&P ADULT - ATTENDING COMMENTS
K80.00 Acute calculous cholecystitis   -OR planning for laparoscopic cholecystectomy  -No need for ductal imaging currently  -Pre-op abx, resuscitation and labs  -NPO/IVF  -Pain control via IV meds    William Jay MD   Acute and Critical Care Surgery  (Cell: [phone])  Email: [email or web address]    The Acute Care Surgery (B Team) Attending Group Practice:  Dr. Sarah Perry, Dr. Andrae Phelps, Dr. Cassandra James, Dr. William Jay    Urgent issues - spectra [id number] or [id number]  Nonurgent issues - [phone]  Patient appointments or after hours - [phone]

## 2020-05-22 NOTE — H&P ADULT - HISTORY OF PRESENT ILLNESS
25F w/ hx of bipolar disorder who presents with 2 weeks of RUQ pain, with intermittent nausea and vomiting. She endorses anorexia for the past 2 days. She has not had this problem before. She denies fever/chills. She denies loose stools, but endorses recent urinary incontinence. She denies any surgical history.   Vital Signs Last 24 Hrs  T(C): 36.6 (22 May 2020 19:45), Max: 36.9 (22 May 2020 14:58)  T(F): 97.9 (22 May 2020 19:45), Max: 98.4 (22 May 2020 14:58)  HR: 88 (22 May 2020 19:45) (88 - 97)  BP: 102/66 (22 May 2020 19:45) (102/66 - 103/70)  BP(mean): --  RR: 19 (22 May 2020 19:45) (18 - 19)  SpO2: 100% (22 May 2020 19:45) (100% - 100%)

## 2020-05-22 NOTE — PHYSICAL EXAM
[Awake] : awake [Mass] : no breast mass [Alert] : alert [Acute Distress] : no acute distress [Nipple Discharge] : no nipple discharge [Soft] : soft [Axillary LAD] : no axillary lymphadenopathy [Oriented x3] : oriented to person, place, and time [Tender] : non tender [Normal] : cervix [Uterine Adnexae] : were not tender and not enlarged [No Bleeding] : there was no active vaginal bleeding

## 2020-05-22 NOTE — H&P ADULT - NSHPPHYSICALEXAM_GEN_ALL_CORE
NAD, awake and alert  Affect appropriate  No gross neurologic deficits  No rashes  No jaundice or scleral icterus  Respirations nonlabored  CV regular pulse  Abdomen soft, tender RUQ  No guarding or rebound tenderness  No surgical scars  Extremities warm

## 2020-05-22 NOTE — CHIEF COMPLAINT
[Annual Visit] : annual visit [FreeTextEntry1] : ok to speak with mom about health\par 2 wks rt sided upper abd pain /nausea,no appetite,lethargy--pt has seen PCP,psych and not assisted in any way--disc with mom my concerns. Pt to go to ED

## 2020-05-22 NOTE — ED PROVIDER NOTE - CARE PLAN
Principal Discharge DX:	Chest pain Principal Discharge DX:	Acute cholecystitis  Secondary Diagnosis:	Chest pain

## 2020-05-22 NOTE — ED ADULT NURSE REASSESSMENT NOTE - NS ED NURSE REASSESS COMMENT FT1
Report received from previous shift RN, pt is ao4, offers no complaints at this time. Breathing even and non-labored, received with 20g IV left AC, line is patent and flushes without difficulty. Report given to CAITLIN MccainS, awaiting transport.

## 2020-05-22 NOTE — HISTORY OF PRESENT ILLNESS
[Reproductive Age] : is of reproductive age [Fair] : being in fair health [Sexually Active] : is not sexually active [HPV Vaccine NA Due to Age] : HPV vaccine not available to patient due to age [Yes] : yes [de-identified] : female partners in the past

## 2020-05-22 NOTE — ED PROVIDER NOTE - PROGRESS NOTE DETAILS
Attending MD Pinedo.  Pt signed out to me in stable condition pending CTA, 24 yo fem R lower chest pain, on OCP's, dimer 900, CTA, bipolar d/o, if neg plan to DC. Attending MD Pinedo.  Pt accepted for admission for cholecystitis by surgery.  Stable for admission.  Cefotetan ordered.

## 2020-05-22 NOTE — ED PROVIDER NOTE - ATTENDING CONTRIBUTION TO CARE
DR. MCFARLAND, ATTENDING MD-  I performed a face to face bedside interview with the patient regarding history of present illness, review of symptoms and past medical history. I completed an independent physical exam.  I have discussed the patient's plan of care with the PA.   Documentation as above in the note.    24 y/o female h/o bipolar d/o on ocp's here with pleuritic cp and rios in addition to ruq discomfort for past few days.  No h/o dvt/pe.  Denies ha, neck stiffness, cough, n/v/d, dysuria, rash.  Afebrile, vs wnl, nad, ctabil, s1s2 rrr no m/r/g, abd soft non ttp no r/g, no cva tenderness b/l, no leg swelling b/l, no rash.  Evaluate for pe as low ris by wells but cannot perc out d/t ocp use; ptx; pna; gb pathology; pregnancy.  Obtain ucg cbc cmp dimer ecg cxr ruq u/s reassess.

## 2020-05-22 NOTE — ED ADULT TRIAGE NOTE - CHIEF COMPLAINT QUOTE
PT C/O right sided ABD pain, radiating to chest x 2 weeks. C/O few episodes vomiting during that time period. Currently denies nausea, vomiting, diarrhea, fevers, dysuria. PHX: MDD, dysthymic disorder, borderline personality disorder.

## 2020-05-22 NOTE — COUNSELING
[Breast Self Exam] : breast self exam [Nutrition] : nutrition [Vitamins/Supplements] : vitamins/supplements [Exercise] : exercise [Safe Sexual Practices] : safe sexual practices

## 2020-05-22 NOTE — H&P ADULT - ASSESSMENT
25F w/ likely acute cholecystitis, 2 weeks of symptoms    - admit to surgery  - NPO/IV fluid resuscitation  - pain control  - IV abx  - will decide on operative mgmt in AM depending on response  - COVID, UHcg, T&S in case of OR    FLAVIA Guzman MD  B Team  [id number] 25F w/ likely acute cholecystitis, 2 weeks of symptoms, no sign of choledocholithiasis/pancreatitis    - admit to surgery  - NPO/IV fluid resuscitation  - pain control  - IV abx  - will decide on operative mgmt in AM depending on response  - COVID, UHcg, T&S in case of OR  - continue psychiatric meds    FLAVIA ALEJANDRO Team  [id number]

## 2020-05-23 ENCOUNTER — RESULT REVIEW (OUTPATIENT)
Age: 25
End: 2020-05-23

## 2020-05-23 ENCOUNTER — TRANSCRIPTION ENCOUNTER (OUTPATIENT)
Age: 25
End: 2020-05-23

## 2020-05-23 LAB
ALBUMIN SERPL ELPH-MCNC: 3.8 G/DL — SIGNIFICANT CHANGE UP (ref 3.3–5)
ALP SERPL-CCNC: 62 U/L — SIGNIFICANT CHANGE UP (ref 40–120)
ALT FLD-CCNC: 9 U/L — SIGNIFICANT CHANGE UP (ref 4–33)
ANION GAP SERPL CALC-SCNC: 14 MMO/L — SIGNIFICANT CHANGE UP (ref 7–14)
AST SERPL-CCNC: 14 U/L — SIGNIFICANT CHANGE UP (ref 4–32)
BILIRUB SERPL-MCNC: 0.2 MG/DL — SIGNIFICANT CHANGE UP (ref 0.2–1.2)
BLD GP AB SCN SERPL QL: NEGATIVE — SIGNIFICANT CHANGE UP
BUN SERPL-MCNC: 8 MG/DL — SIGNIFICANT CHANGE UP (ref 7–23)
CALCIUM SERPL-MCNC: 8.7 MG/DL — SIGNIFICANT CHANGE UP (ref 8.4–10.5)
CHLORIDE SERPL-SCNC: 106 MMOL/L — SIGNIFICANT CHANGE UP (ref 98–107)
CO2 SERPL-SCNC: 19 MMOL/L — LOW (ref 22–31)
CREAT SERPL-MCNC: 0.64 MG/DL — SIGNIFICANT CHANGE UP (ref 0.5–1.3)
CULTURE RESULTS: SIGNIFICANT CHANGE UP
GLUCOSE SERPL-MCNC: 72 MG/DL — SIGNIFICANT CHANGE UP (ref 70–99)
HCG UR-SCNC: NEGATIVE — SIGNIFICANT CHANGE UP
HCT VFR BLD CALC: 32.8 % — LOW (ref 34.5–45)
HGB BLD-MCNC: 11.6 G/DL — SIGNIFICANT CHANGE UP (ref 11.5–15.5)
MAGNESIUM SERPL-MCNC: 2.3 MG/DL — SIGNIFICANT CHANGE UP (ref 1.6–2.6)
MCHC RBC-ENTMCNC: 33 PG — SIGNIFICANT CHANGE UP (ref 27–34)
MCHC RBC-ENTMCNC: 35.4 % — SIGNIFICANT CHANGE UP (ref 32–36)
MCV RBC AUTO: 93.2 FL — SIGNIFICANT CHANGE UP (ref 80–100)
NRBC # FLD: 0 K/UL — SIGNIFICANT CHANGE UP (ref 0–0)
PHOSPHATE SERPL-MCNC: 2.9 MG/DL — SIGNIFICANT CHANGE UP (ref 2.5–4.5)
PLATELET # BLD AUTO: 265 K/UL — SIGNIFICANT CHANGE UP (ref 150–400)
PMV BLD: 10.9 FL — SIGNIFICANT CHANGE UP (ref 7–13)
POTASSIUM SERPL-MCNC: 3.7 MMOL/L — SIGNIFICANT CHANGE UP (ref 3.5–5.3)
POTASSIUM SERPL-SCNC: 3.7 MMOL/L — SIGNIFICANT CHANGE UP (ref 3.5–5.3)
PROT SERPL-MCNC: 7 G/DL — SIGNIFICANT CHANGE UP (ref 6–8.3)
RBC # BLD: 3.52 M/UL — LOW (ref 3.8–5.2)
RBC # FLD: 13.1 % — SIGNIFICANT CHANGE UP (ref 10.3–14.5)
RH IG SCN BLD-IMP: POSITIVE — SIGNIFICANT CHANGE UP
SODIUM SERPL-SCNC: 139 MMOL/L — SIGNIFICANT CHANGE UP (ref 135–145)
SP GR UR: 1.01 — SIGNIFICANT CHANGE UP (ref 1–1.04)
SPECIMEN SOURCE: SIGNIFICANT CHANGE UP
WBC # BLD: 12.13 K/UL — HIGH (ref 3.8–10.5)
WBC # FLD AUTO: 12.13 K/UL — HIGH (ref 3.8–10.5)

## 2020-05-23 PROCEDURE — 88304 TISSUE EXAM BY PATHOLOGIST: CPT | Mod: 26

## 2020-05-23 PROCEDURE — 47562 LAPAROSCOPIC CHOLECYSTECTOMY: CPT

## 2020-05-23 RX ORDER — OXYCODONE HYDROCHLORIDE 5 MG/1
10 TABLET ORAL EVERY 4 HOURS
Refills: 0 | Status: DISCONTINUED | OUTPATIENT
Start: 2020-05-23 | End: 2020-05-24

## 2020-05-23 RX ORDER — OXYCODONE HYDROCHLORIDE 5 MG/1
5 TABLET ORAL EVERY 4 HOURS
Refills: 0 | Status: DISCONTINUED | OUTPATIENT
Start: 2020-05-23 | End: 2020-05-24

## 2020-05-23 RX ORDER — ACETAMINOPHEN 500 MG
650 TABLET ORAL EVERY 6 HOURS
Refills: 0 | Status: DISCONTINUED | OUTPATIENT
Start: 2020-05-23 | End: 2020-05-24

## 2020-05-23 RX ORDER — ONDANSETRON 8 MG/1
4 TABLET, FILM COATED ORAL ONCE
Refills: 0 | Status: DISCONTINUED | OUTPATIENT
Start: 2020-05-23 | End: 2020-05-23

## 2020-05-23 RX ORDER — HYDROMORPHONE HYDROCHLORIDE 2 MG/ML
0.5 INJECTION INTRAMUSCULAR; INTRAVENOUS; SUBCUTANEOUS
Refills: 0 | Status: DISCONTINUED | OUTPATIENT
Start: 2020-05-23 | End: 2020-05-23

## 2020-05-23 RX ORDER — SODIUM CHLORIDE 9 MG/ML
1000 INJECTION, SOLUTION INTRAVENOUS
Refills: 0 | Status: DISCONTINUED | OUTPATIENT
Start: 2020-05-23 | End: 2020-05-23

## 2020-05-23 RX ADMIN — BUPROPION HYDROCHLORIDE 150 MILLIGRAM(S): 150 TABLET, EXTENDED RELEASE ORAL at 15:55

## 2020-05-23 RX ADMIN — DEXTROAMPHETAMINE SACCHARATE, AMPHETAMINE ASPARTATE, DEXTROAMPHETAMINE SULFATE AND AMPHETAMINE SULFATE 10 MILLIGRAM(S): 1.875; 1.875; 1.875; 1.875 TABLET ORAL at 15:55

## 2020-05-23 RX ADMIN — Medication 2 MILLIGRAM(S): at 20:33

## 2020-05-23 RX ADMIN — CLOZAPINE 300 MILLIGRAM(S): 150 TABLET, ORALLY DISINTEGRATING ORAL at 20:34

## 2020-05-23 RX ADMIN — Medication 200 MILLIGRAM(S): at 20:33

## 2020-05-23 RX ADMIN — CLOZAPINE 25 MILLIGRAM(S): 150 TABLET, ORALLY DISINTEGRATING ORAL at 15:55

## 2020-05-23 RX ADMIN — LITHIUM CARBONATE 300 MILLIGRAM(S): 300 TABLET, EXTENDED RELEASE ORAL at 15:55

## 2020-05-23 RX ADMIN — ENOXAPARIN SODIUM 40 MILLIGRAM(S): 100 INJECTION SUBCUTANEOUS at 15:55

## 2020-05-23 RX ADMIN — FLUVOXAMINE MALEATE 100 MILLIGRAM(S): 25 TABLET ORAL at 20:34

## 2020-05-23 RX ADMIN — Medication 650 MILLIGRAM(S): at 19:30

## 2020-05-23 RX ADMIN — Medication 100 GRAM(S): at 05:58

## 2020-05-23 RX ADMIN — LITHIUM CARBONATE 900 MILLIGRAM(S): 300 TABLET, EXTENDED RELEASE ORAL at 20:34

## 2020-05-23 NOTE — DISCHARGE NOTE PROVIDER - HOSPITAL COURSE
Pt was admitted 5/22/2020 with abdominal pain and found to have acute cholecystitis. Pt underwent laparoscopic cholecystectomy.  Pt. tolerated procedure well and was transferred to the recovery room and then the floor without incidence.  Pt. was mainly managed for postoperative pain, and wound care, as well as close monitoring of fluid resuscitation and return of GI function.  Pt has been tolerating a diet, voiding, ambulating, and the pain is now well controlled.   Pt. is ready for discharge home in stable condition, and will follow up in two weeks as an outpatient with Dr. Phelps.

## 2020-05-23 NOTE — PROGRESS NOTE ADULT - ASSESSMENT
24yo F with bipolar disorder and MDD presents with acute cholecystitis. There were no acute events overnight.    Plan:  NPO/IVF  Pain Control  Continue home medications  Possible OR later today    B Team Surgery  [id number]

## 2020-05-23 NOTE — DISCHARGE NOTE PROVIDER - CARE PROVIDER_API CALL
Andrae Phelps E  General Surgery  [id number] [place] [address]  Phone: [phone]  Fax: [phone]  Follow Up Time: 2 weeks

## 2020-05-23 NOTE — DISCHARGE NOTE PROVIDER - NSDCFUADDINST_GEN_ALL_CORE_FT
PAIN CONTROL: You may take 650 mg of Tylenol every 4-6 hours. Do not exceed 4 grams of Tylenol daily. Take oxycodone as needed for severe pain, one tab every 6 hours. Do not exceed 4 tabs daily.  WOUND CARE:   BATHING: Please do not submerge wound underwater. You may shower after 48 hours and/or sponge bathe.  ACTIVITY: No heavy lifting or straining. Otherwise, you may return to your usual level of physical activity. If you are taking narcotic pain medication (such as oxycodone), do NOT drive a car, operate machinery or make important decisions.  DIET: Return to your usual diet.  NOTIFY YOUR SURGEON IF: You have any bleeding that does not stop, any pus draining from your wound, any fever (over 100.4 F) or chills, persistent nausea/vomiting, persistent diarrhea, or if your pain is not controlled on your discharge pain medications.  FOLLOW-UP:  1. Please call to make a follow-up appointment within 1-2 weeks of discharge.  2. Please follow up with your primary care physician in 1-2 weeks regarding your hospitalization.

## 2020-05-23 NOTE — PROGRESS NOTE ADULT - SUBJECTIVE AND OBJECTIVE BOX
Morning Surgical Progress Note  Patient is a 25y old  Female who presents with a chief complaint of acute cholecystitis (22 May 2020 20:40)      SUBJECTIVE: Patient seen and examined at bedside with surgical team, there were no acute events overnight. Patient complains of abdominal pain in RUQ.    Vital Signs Last 24 Hrs  T(C): 36.3 (22 May 2020 21:46), Max: 36.9 (22 May 2020 14:58)  T(F): 97.3 (22 May 2020 21:46), Max: 98.4 (22 May 2020 14:58)  HR: 90 (22 May 2020 21:46) (88 - 97)  BP: 109/62 (22 May 2020 21:46) (102/66 - 109/62)  BP(mean): --  RR: 18 (22 May 2020 21:46) (18 - 19)  SpO2: 100% (22 May 2020 21:46) (100% - 100%)I&O's Detail    22 May 2020 07:01  -  23 May 2020 01:54  --------------------------------------------------------  IN:    lactated ringers.: 250 mL  Total IN: 250 mL    OUT:  Total OUT: 0 mL    Total NET: 250 mL      MEDICATIONS  (STANDING):  amphetamine/dextroamphetamine 10 milliGRAM(s) Oral daily  amphetamine/dextroamphetamine XR 5 milliGRAM(s) Oral daily  buPROPion XL . 150 milliGRAM(s) Oral daily  cefoTEtan  IVPB 2 Gram(s) IV Intermittent every 12 hours  cloZAPine 300 milliGRAM(s) Oral at bedtime  cloZAPine 25 milliGRAM(s) Oral daily  enoxaparin Injectable 40 milliGRAM(s) SubCutaneous daily  fluvoxaMINE 100 milliGRAM(s) Oral at bedtime  lactated ringers. 1000 milliLiter(s) (125 mL/Hr) IV Continuous <Continuous>  lithium 300 milliGRAM(s) Oral <User Schedule>  lithium 900 milliGRAM(s) Oral at bedtime  LORazepam     Tablet 2 milliGRAM(s) Oral at bedtime  traZODone 200 milliGRAM(s) Oral at bedtime    MEDICATIONS  (PRN):  HYDROmorphone  Injectable 0.5 milliGRAM(s) IV Push every 4 hours PRN Moderate Pain (4 - 6)  HYDROmorphone  Injectable 1 milliGRAM(s) IV Push every 4 hours PRN Severe Pain (7 - 10)      Physical Exam  Constitutional: A&Ox3, NAD  Respiratory: CTA b/l  Cardiac: RRR, S1 and S2, no m/r/g  Gastrointestinal: abdomen soft, ND, tender to palpation in RUQ     LABS:                        12.3   13.39 )-----------( 262      ( 22 May 2020 16:48 )             35.6         136  |  102  |  10  ----------------------------<  91  3.7   |  22  |  0.85    Ca    9.5      22 May 2020 16:48    TPro  7.8  /  Alb  4.0  /  TBili  0.2  /  DBili  x   /  AST  18  /  ALT  14  /  AlkPhos  64        LIVER FUNCTIONS - ( 22 May 2020 16:48 )  Alb: 4.0 g/dL / Pro: 7.8 g/dL / ALK PHOS: 64 u/L / ALT: 14 u/L / AST: 18 u/L / GGT: x           Urinalysis Basic - ( 22 May 2020 16:50 )    Color: YELLOW / Appearance: CLEAR / S.014 / pH: 7.0  Gluc: NEGATIVE / Ketone: NEGATIVE  / Bili: NEGATIVE / Urobili: NORMAL   Blood: NEGATIVE / Protein: 30 / Nitrite: NEGATIVE   Leuk Esterase: NEGATIVE / RBC: 3-5 / WBC 3-5   Sq Epi: OCC / Non Sq Epi: x / Bacteria: FEW      ABO Interpretation: O (20 @ 21:39)

## 2020-05-23 NOTE — DISCHARGE NOTE PROVIDER - NSDCCPCAREPLAN_GEN_ALL_CORE_FT
PRINCIPAL DISCHARGE DIAGNOSIS  Diagnosis: Acute cholecystitis  Assessment and Plan of Treatment:       SECONDARY DISCHARGE DIAGNOSES  Diagnosis: Chest pain  Assessment and Plan of Treatment: PRINCIPAL DISCHARGE DIAGNOSIS  Diagnosis: Acute cholecystitis  Assessment and Plan of Treatment: laparoscopic cholecystectomy      SECONDARY DISCHARGE DIAGNOSES  Diagnosis: Chest pain  Assessment and Plan of Treatment:

## 2020-05-23 NOTE — CHART NOTE - NSCHARTNOTEFT_GEN_A_CORE
SURGERY POST-OP NOTE    S/P laparoscopic cholecystectomy for acute cholecystitis     Subjective:   Patient doing well. Pain is well controlled. Tolerating Diet. Minimal abdominal pain. OOB ambulating. Denies fevers, chills, nausea, emesis, chest pain, or shortness of breath.    Objective:   Vital Signs  T(C): 36.8 (05-23 @ 15:53), Max: 37.1 (05-23 @ 05:52)  HR: 92 (05-23 @ 15:53) (84 - 93)  BP: 101/62 (05-23 @ 15:53) (90/57 - 109/62)  RR: 20 (05-23 @ 15:53) (16 - 22)  SpO2: 98% (05-23 @ 15:53) (96% - 100%)  05-22-20 @ 07:01  -  05-23-20 @ 07:00  --------------------------------------------------------  IN: 750 mL / OUT: 0 mL / NET: 750 mL      Physical Exam:  General: alert and oriented, NAD  Resp: airway patent, respirations unlabored  CVS: regular rate and rhythm  Abdomen: soft, nontender, nondistended, incisions w dressings c/d/i  Extremities: no edema  Skin: warm, dry, appropriate color    Labs:                      11.6   12.13 )-----------( 265      ( 23 May 2020 06:00 )             32.8   05-23    139  |  106  |  8   ----------------------------<  72  3.7   |  19<L>  |  0.64    Ca    8.7      23 May 2020 06:00  Phos  2.9     05-23  Mg     2.3     05-23    TPro  7.0  /  Alb  3.8  /  TBili  0.2  /  DBili  x   /  AST  14  /  ALT  9   /  AlkPhos  62  05-23      Assessment:  25 year old female PMH bipolar disorder few hours s/p laparoscopic cholecystectomy for acute cholecystitis. Recovering well post-operatively.     Plan:  - Diet: Regular as tolerated  - Pain control prn  - Resume home meds  - F/U AM labs  - DVT ppx: Lovenox   - Encourage OOB/ambulate/IS    B Team Surgery   [id number]

## 2020-05-23 NOTE — BRIEF OPERATIVE NOTE - NSICDXBRIEFPREOP_GEN_ALL_CORE_FT
PRE-OP DIAGNOSIS:  Acute cholecystitis 23-May-2020 13:24:15  Ivonne Lin  Acute cholecystitis 23-May-2020 13:24:07  Ivonne Lin

## 2020-05-23 NOTE — PRE-OP CHECKLIST - 2.
earrings in place,unable to be removed  Earrings in place, unable to be removed earrings in place,unable to be removed; dr. leon & dr. colladour aware   Earrings in place, unable to be removed

## 2020-05-23 NOTE — DISCHARGE NOTE PROVIDER - NSDCMRMEDTOKEN_GEN_ALL_CORE_FT
Amitiza 24 mcg oral capsule: 1 cap(s) orally 2 times a day  amphetamine salt: 10 milligram(s) orally once a day  buPROPion 75 mg oral tablet: 1 tab(s) orally once a day  cholecalciferol oral tablet: 2000 unit(s) orally once a day  cloZAPine 100 mg oral tablet: 3 tab(s) orally once a day (at bedtime)  cloZAPine 25 mg oral tablet: 1 tab(s) orally once a day  dextroamphetamine-amphetamine 5 mg oral tablet: 1 tab(s) orally once a day  fluvoxaMINE: 100 milligram(s) orally once a day (at bedtime)  lithium 300 mg oral tablet, extended release: 1 tab(s) orally once a day  lithium 300 mg oral tablet, extended release: 2 tab(s) orally once a day (at bedtime)  LORazepam 2 mg oral tablet: 1 tab(s) orally once a day (at bedtime)  melatonin 3 mg oral tablet: 2 tab(s) orally , As needed, Insomnia  mybetriq: 50 milligram(s) orally once a day  notrel: 1 tab(s) orally once a day  traZODone 100 mg oral tablet: 2 tab(s) orally once a day (at bedtime) acetaminophen 325 mg oral tablet: 2 tab(s) orally every 6 hours, As needed, Mild Pain (1 - 3)  Amitiza 24 mcg oral capsule: 1 cap(s) orally 2 times a day  amphetamine salt: 10 milligram(s) orally once a day  buPROPion 75 mg oral tablet: 1 tab(s) orally once a day  cholecalciferol oral tablet: 2000 unit(s) orally once a day  cloZAPine 100 mg oral tablet: 3 tab(s) orally once a day (at bedtime)  cloZAPine 25 mg oral tablet: 1 tab(s) orally once a day  dextroamphetamine-amphetamine 5 mg oral tablet: 1 tab(s) orally once a day  fluvoxaMINE: 100 milligram(s) orally once a day (at bedtime)  lithium 300 mg oral tablet, extended release: 1 tab(s) orally once a day  lithium 300 mg oral tablet, extended release: 2 tab(s) orally once a day (at bedtime)  LORazepam 2 mg oral tablet: 1 tab(s) orally once a day (at bedtime)  melatonin 3 mg oral tablet: 2 tab(s) orally , As needed, Insomnia  mybetriq: 50 milligram(s) orally once a day  notrel: 1 tab(s) orally once a day  traZODone 100 mg oral tablet: 2 tab(s) orally once a day (at bedtime) acetaminophen 325 mg oral tablet: 2 tab(s) orally every 6 hours, As needed, Mild Pain (1 - 3)  Amitiza 24 mcg oral capsule: 1 cap(s) orally 2 times a day  amphetamine salt: 10 milligram(s) orally once a day  buPROPion 75 mg oral tablet: 1 tab(s) orally once a day  cholecalciferol oral tablet: 2000 unit(s) orally once a day  cloZAPine 100 mg oral tablet: 3 tab(s) orally once a day (at bedtime)  cloZAPine 25 mg oral tablet: 1 tab(s) orally once a day  dextroamphetamine-amphetamine 5 mg oral tablet: 1 tab(s) orally once a day  fluvoxaMINE: 100 milligram(s) orally once a day (at bedtime)  lithium 300 mg oral tablet, extended release: 1 tab(s) orally once a day  lithium 300 mg oral tablet, extended release: 2 tab(s) orally once a day (at bedtime)  LORazepam 2 mg oral tablet: 1 tab(s) orally once a day (at bedtime)  melatonin 3 mg oral tablet: 2 tab(s) orally , As needed, Insomnia  mybetriq: 50 milligram(s) orally once a day  notrel: 1 tab(s) orally once a day  oxyCODONE 5 mg oral tablet: 1 tab(s) orally every 6 hours, As Needed -Moderate Pain (4 - 6) MDD:4 tablets  traZODone 100 mg oral tablet: 2 tab(s) orally once a day (at bedtime)

## 2020-05-23 NOTE — DISCHARGE NOTE PROVIDER - NSDCACTIVITY_GEN_ALL_CORE
Stairs allowed/Showering allowed/Walking - Indoors allowed/No heavy lifting/straining/Driving allowed/Walking - Outdoors allowed

## 2020-05-24 ENCOUNTER — TRANSCRIPTION ENCOUNTER (OUTPATIENT)
Age: 25
End: 2020-05-24

## 2020-05-24 VITALS
SYSTOLIC BLOOD PRESSURE: 96 MMHG | HEART RATE: 81 BPM | OXYGEN SATURATION: 100 % | DIASTOLIC BLOOD PRESSURE: 63 MMHG | TEMPERATURE: 98 F | RESPIRATION RATE: 16 BRPM

## 2020-05-24 DIAGNOSIS — R07.9 CHEST PAIN, UNSPECIFIED: ICD-10-CM

## 2020-05-24 LAB
ALBUMIN SERPL ELPH-MCNC: 3.6 G/DL — SIGNIFICANT CHANGE UP (ref 3.3–5)
ALP SERPL-CCNC: 64 U/L — SIGNIFICANT CHANGE UP (ref 40–120)
ALT FLD-CCNC: 41 U/L — HIGH (ref 4–33)
ANION GAP SERPL CALC-SCNC: 12 MMO/L — SIGNIFICANT CHANGE UP (ref 7–14)
AST SERPL-CCNC: 40 U/L — HIGH (ref 4–32)
BILIRUB SERPL-MCNC: < 0.2 MG/DL — LOW (ref 0.2–1.2)
BUN SERPL-MCNC: 8 MG/DL — SIGNIFICANT CHANGE UP (ref 7–23)
CALCIUM SERPL-MCNC: 8.9 MG/DL — SIGNIFICANT CHANGE UP (ref 8.4–10.5)
CHLORIDE SERPL-SCNC: 107 MMOL/L — SIGNIFICANT CHANGE UP (ref 98–107)
CO2 SERPL-SCNC: 21 MMOL/L — LOW (ref 22–31)
CREAT SERPL-MCNC: 0.57 MG/DL — SIGNIFICANT CHANGE UP (ref 0.5–1.3)
GLUCOSE SERPL-MCNC: 131 MG/DL — HIGH (ref 70–99)
HCT VFR BLD CALC: 32.9 % — LOW (ref 34.5–45)
HGB BLD-MCNC: 11.3 G/DL — LOW (ref 11.5–15.5)
MAGNESIUM SERPL-MCNC: 2.1 MG/DL — SIGNIFICANT CHANGE UP (ref 1.6–2.6)
MCHC RBC-ENTMCNC: 31.6 PG — SIGNIFICANT CHANGE UP (ref 27–34)
MCHC RBC-ENTMCNC: 34.3 % — SIGNIFICANT CHANGE UP (ref 32–36)
MCV RBC AUTO: 91.9 FL — SIGNIFICANT CHANGE UP (ref 80–100)
NRBC # FLD: 0 K/UL — SIGNIFICANT CHANGE UP (ref 0–0)
PHOSPHATE SERPL-MCNC: 2.1 MG/DL — LOW (ref 2.5–4.5)
PLATELET # BLD AUTO: 274 K/UL — SIGNIFICANT CHANGE UP (ref 150–400)
PMV BLD: 10.6 FL — SIGNIFICANT CHANGE UP (ref 7–13)
POTASSIUM SERPL-MCNC: 3.7 MMOL/L — SIGNIFICANT CHANGE UP (ref 3.5–5.3)
POTASSIUM SERPL-SCNC: 3.7 MMOL/L — SIGNIFICANT CHANGE UP (ref 3.5–5.3)
PROT SERPL-MCNC: 6.6 G/DL — SIGNIFICANT CHANGE UP (ref 6–8.3)
RBC # BLD: 3.58 M/UL — LOW (ref 3.8–5.2)
RBC # FLD: 12.5 % — SIGNIFICANT CHANGE UP (ref 10.3–14.5)
SODIUM SERPL-SCNC: 140 MMOL/L — SIGNIFICANT CHANGE UP (ref 135–145)
WBC # BLD: 17.08 K/UL — HIGH (ref 3.8–10.5)
WBC # FLD AUTO: 17.08 K/UL — HIGH (ref 3.8–10.5)

## 2020-05-24 RX ORDER — ACETAMINOPHEN 500 MG
2 TABLET ORAL
Qty: 0 | Refills: 0 | DISCHARGE
Start: 2020-05-24

## 2020-05-24 RX ORDER — SODIUM,POTASSIUM PHOSPHATES 278-250MG
2 POWDER IN PACKET (EA) ORAL
Refills: 0 | Status: DISCONTINUED | OUTPATIENT
Start: 2020-05-24 | End: 2020-05-24

## 2020-05-24 RX ORDER — OXYCODONE HYDROCHLORIDE 5 MG/1
1 TABLET ORAL
Qty: 28 | Refills: 0
Start: 2020-05-24 | End: 2020-05-30

## 2020-05-24 RX ADMIN — OXYCODONE HYDROCHLORIDE 10 MILLIGRAM(S): 5 TABLET ORAL at 04:25

## 2020-05-24 RX ADMIN — LITHIUM CARBONATE 300 MILLIGRAM(S): 300 TABLET, EXTENDED RELEASE ORAL at 11:18

## 2020-05-24 RX ADMIN — BUPROPION HYDROCHLORIDE 150 MILLIGRAM(S): 150 TABLET, EXTENDED RELEASE ORAL at 11:20

## 2020-05-24 RX ADMIN — CLOZAPINE 25 MILLIGRAM(S): 150 TABLET, ORALLY DISINTEGRATING ORAL at 11:18

## 2020-05-24 RX ADMIN — OXYCODONE HYDROCHLORIDE 5 MILLIGRAM(S): 5 TABLET ORAL at 09:30

## 2020-05-24 RX ADMIN — DEXTROAMPHETAMINE SACCHARATE, AMPHETAMINE ASPARTATE, DEXTROAMPHETAMINE SULFATE AND AMPHETAMINE SULFATE 5 MILLIGRAM(S): 1.875; 1.875; 1.875; 1.875 TABLET ORAL at 11:19

## 2020-05-24 RX ADMIN — Medication 2 PACKET(S): at 08:00

## 2020-05-24 NOTE — PROVIDER CONTACT NOTE (OTHER) - ASSESSMENT
A&Ox4, OOB independently, voids without difficulty. Pain adequately managed. Vitals stable except low BP

## 2020-05-24 NOTE — PROGRESS NOTE ADULT - ASSESSMENT
ASSESSMENT:  25 year old female PMH bipolar disorder POD1 s/p laparoscopic cholecystectomy for acute cholecystitis. Recovering well post-operatively.     PLAN:  - Diet: Regular as tolerated  - Pain control prn  - Resume home meds  - F/U AM labs  - DVT ppx: Lovenox   - Encourage OOB/ambulate/IS    B Team Surgery   [id number]. ASSESSMENT:  25 year old female PMH bipolar disorder POD1 s/p laparoscopic cholecystectomy for acute cholecystitis. Recovering well post-operatively.     PLAN:  - Diet: Regular as tolerated  - Pain control prn  - Resume home meds  - F/U AM labs  - DVT ppx: Lovenox   - Encourage OOB/ambulate/IS  - Dispo: home    B Team Surgery   [id number].

## 2020-05-24 NOTE — PROGRESS NOTE ADULT - ATTENDING COMMENTS
s/p lap mandeep for acute cholecystitis    Complains of incisional pain  Tolerating diet  AOX3 not in distress, appears comfortable  Anicteric sclera  Incision CDI  Discharge home  Follow up in 2 weeks, call for fever, jaundice, increasing pain

## 2020-05-24 NOTE — DISCHARGE NOTE NURSING/CASE MANAGEMENT/SOCIAL WORK - NSTOBACCONEVERSMOKERY/N_GEN_A
No [NL] : normotonic [de-identified] : erythematous papular rash on face, neck, trunk, extremities

## 2020-05-24 NOTE — DISCHARGE NOTE NURSING/CASE MANAGEMENT/SOCIAL WORK - NSDCPNINST_GEN_ALL_CORE
Increasing pain with not relieved with pain medication, fever nausea and vomited after eating with stomach bloating, monitor incision for redness, discharge call MD.

## 2020-05-24 NOTE — DISCHARGE NOTE NURSING/CASE MANAGEMENT/SOCIAL WORK - NSDCPNDISPN_GEN_ALL_CORE
Safe use, storage and disposal of opioids when prescribed/Opioids not applicable/not prescribed/Activities of daily living, including home environment that might     exacerbate pain or reduce effectiveness of the pain management plan of care as well as strategies to address these issues

## 2020-05-24 NOTE — DISCHARGE NOTE NURSING/CASE MANAGEMENT/SOCIAL WORK - PATIENT PORTAL LINK FT
You can access the FollowMyHealth Patient Portal offered by [facility] by registering at the following website: [email or web address]. By joining [facility]’s FollowMyHealth portal, you will also be able to view your health information using other applications (apps) compatible with our system.

## 2020-05-24 NOTE — PROGRESS NOTE ADULT - SUBJECTIVE AND OBJECTIVE BOX
GENERAL SURGERY PROGRESS NOTE    25yFemale    SUBJECTIVE:  Patient seen and examined at bedside. No acute events overnight. tolerating diet with good pain control. Denies fevers, chills, nausea, vomiting, chest pain, and shortness of breath.     --------------------------------------------------------------------------------------------------  OBJECTIVE:     Vital Signs:  Vital Signs Last 24 Hrs  T(C): 37.1 (24 May 2020 05:11), Max: 37.2 (24 May 2020 02:00)  T(F): 98.7 (24 May 2020 05:11), Max: 98.9 (24 May 2020 02:00)  HR: 87 (24 May 2020 05:11) (84 - 93)  BP: 90/59 (24 May 2020 05:11) (90/59 - 106/76)  BP(mean): 67 (23 May 2020 15:15) (67 - 73)  RR: 16 (24 May 2020 05:11) (16 - 22)  SpO2: 98% (24 May 2020 05:11) (93% - 99%)    --------------------------------------------------------------------------------------------------  Inputs/Outputs:    22 May 2020 07:01  -  23 May 2020 07:00  --------------------------------------------------------  IN:    lactated ringers.: 750 mL  Total IN: 750 mL    OUT:  Total OUT: 0 mL    Total NET: 750 mL        --------------------------------------------------------------------------------------------------  Laboratories:                        11.6   12.13 )-----------( 265      ( 23 May 2020 06:00 )             32.8     LIVER FUNCTIONS - ( 23 May 2020 06:00 )  Alb: 3.8 g/dL / Pro: 7.0 g/dL / ALK PHOS: 62 u/L / ALT: 9 u/L / AST: 14 u/L / GGT: x             Culture - Urine (collected 22 May 2020 16:12)  Source: .Urine Clean Catch (Midstream)  Final Report (23 May 2020 21:06):    <10,000 CFU/mL Normal Urogenital Meghan      23 May 2020 06:00    139    |  106    |  8      ----------------------------<  72     3.7     |  19     |  0.64     Ca    8.7        23 May 2020 06:00  Phos  2.9       23 May 2020 06:00  Mg     2.3       23 May 2020 06:00    TPro  7.0    /  Alb  3.8    /  TBili  0.2    /  DBili  x      /  AST  14     /  ALT  9      /  AlkPhos  62     23 May 2020 06:00      Urinalysis Basic - ( 22 May 2020 16:50 )    Color: YELLOW / Appearance: CLEAR / S.014 / pH: 7.0  Gluc: NEGATIVE / Ketone: NEGATIVE  / Bili: NEGATIVE / Urobili: NORMAL   Blood: NEGATIVE / Protein: 30 / Nitrite: NEGATIVE   Leuk Esterase: NEGATIVE / RBC: 3-5 / WBC 3-5   Sq Epi: OCC / Non Sq Epi: x / Bacteria: FEW      --------------------------------------------------------------------------------------------------  Physical Exam:  General: AAOx3, NAD, resting comfortably   HEENT: NC/AT  Respiratory: no increased work of breathing   Abdomen: soft, nontender, nondistended, no rebound/guarding. incisions w dressings c/d/i  Extremities: warm and well perfused  --------------------------------------------------------------------------------------------------  Medications:  MEDICATIONS  (STANDING):  amphetamine/dextroamphetamine 10 milliGRAM(s) Oral daily  amphetamine/dextroamphetamine XR 5 milliGRAM(s) Oral daily  buPROPion XL . 150 milliGRAM(s) Oral daily  cloZAPine 25 milliGRAM(s) Oral daily  cloZAPine 300 milliGRAM(s) Oral at bedtime  enoxaparin Injectable 40 milliGRAM(s) SubCutaneous daily  fluvoxaMINE 100 milliGRAM(s) Oral at bedtime  lithium 300 milliGRAM(s) Oral <User Schedule>  lithium 900 milliGRAM(s) Oral at bedtime  LORazepam     Tablet 2 milliGRAM(s) Oral at bedtime  traZODone 200 milliGRAM(s) Oral at bedtime    MEDICATIONS  (PRN):  acetaminophen   Tablet .. 650 milliGRAM(s) Oral every 6 hours PRN Mild Pain (1 - 3)  HYDROmorphone  Injectable 0.5 milliGRAM(s) IV Push every 4 hours PRN Moderate Pain (4 - 6)  oxyCODONE    IR 5 milliGRAM(s) Oral every 4 hours PRN Moderate Pain (4 - 6)  oxyCODONE    IR 10 milliGRAM(s) Oral every 4 hours PRN Severe Pain (7 - 10)

## 2020-05-26 PROBLEM — F31.9 BIPOLAR DISORDER, UNSPECIFIED: Chronic | Status: ACTIVE | Noted: 2020-05-22

## 2020-05-26 PROBLEM — F32.9 MAJOR DEPRESSIVE DISORDER, SINGLE EPISODE, UNSPECIFIED: Chronic | Status: ACTIVE | Noted: 2020-05-22

## 2020-05-28 ENCOUNTER — INPATIENT (INPATIENT)
Facility: HOSPITAL | Age: 25
LOS: 7 days | Discharge: HOME HEALTH | End: 2020-06-05
Attending: INTERNAL MEDICINE | Admitting: INTERNAL MEDICINE
Payer: COMMERCIAL

## 2020-05-28 VITALS
DIASTOLIC BLOOD PRESSURE: 69 MMHG | HEART RATE: 101 BPM | OXYGEN SATURATION: 99 % | TEMPERATURE: 98 F | SYSTOLIC BLOOD PRESSURE: 104 MMHG | HEIGHT: 64 IN | RESPIRATION RATE: 16 BRPM

## 2020-05-28 DIAGNOSIS — Z71.89 OTHER SPECIFIED COUNSELING: ICD-10-CM

## 2020-05-28 DIAGNOSIS — Z90.49 ACQUIRED ABSENCE OF OTHER SPECIFIED PARTS OF DIGESTIVE TRACT: Chronic | ICD-10-CM

## 2020-05-28 LAB
ALBUMIN SERPL ELPH-MCNC: 4.1 G/DL — SIGNIFICANT CHANGE UP (ref 3.3–5)
ALP SERPL-CCNC: 74 U/L — SIGNIFICANT CHANGE UP (ref 40–120)
ALT FLD-CCNC: 25 U/L — SIGNIFICANT CHANGE UP (ref 4–33)
ANION GAP SERPL CALC-SCNC: 12 MMO/L — SIGNIFICANT CHANGE UP (ref 7–14)
APAP SERPL-MCNC: < 15 UG/ML — LOW (ref 15–25)
APPEARANCE UR: SIGNIFICANT CHANGE UP
AST SERPL-CCNC: 19 U/L — SIGNIFICANT CHANGE UP (ref 4–32)
BACTERIA # UR AUTO: SIGNIFICANT CHANGE UP
BASE EXCESS BLDV CALC-SCNC: -0.3 MMOL/L — SIGNIFICANT CHANGE UP
BASOPHILS # BLD AUTO: 0.05 K/UL — SIGNIFICANT CHANGE UP (ref 0–0.2)
BASOPHILS NFR BLD AUTO: 0.4 % — SIGNIFICANT CHANGE UP (ref 0–2)
BILIRUB SERPL-MCNC: < 0.2 MG/DL — LOW (ref 0.2–1.2)
BILIRUB UR-MCNC: NEGATIVE — SIGNIFICANT CHANGE UP
BLOOD GAS VENOUS - CREATININE: 0.65 MG/DL — SIGNIFICANT CHANGE UP (ref 0.5–1.3)
BLOOD GAS VENOUS - FIO2: 21 — SIGNIFICANT CHANGE UP
BLOOD UR QL VISUAL: NEGATIVE — SIGNIFICANT CHANGE UP
BUN SERPL-MCNC: 11 MG/DL — SIGNIFICANT CHANGE UP (ref 7–23)
CALCIUM SERPL-MCNC: 9.7 MG/DL — SIGNIFICANT CHANGE UP (ref 8.4–10.5)
CHLORIDE BLDV-SCNC: 108 MMOL/L — SIGNIFICANT CHANGE UP (ref 96–108)
CHLORIDE SERPL-SCNC: 103 MMOL/L — SIGNIFICANT CHANGE UP (ref 98–107)
CO2 SERPL-SCNC: 20 MMOL/L — LOW (ref 22–31)
COD CRY URNS QL: SIGNIFICANT CHANGE UP
COLOR SPEC: YELLOW — SIGNIFICANT CHANGE UP
CREAT SERPL-MCNC: 0.92 MG/DL — SIGNIFICANT CHANGE UP (ref 0.5–1.3)
CYTOLOGY CVX/VAG DOC THIN PREP: NORMAL
EOSINOPHIL # BLD AUTO: 0.18 K/UL — SIGNIFICANT CHANGE UP (ref 0–0.5)
EOSINOPHIL NFR BLD AUTO: 1.4 % — SIGNIFICANT CHANGE UP (ref 0–6)
EPI CELLS # UR: SIGNIFICANT CHANGE UP
ETHANOL BLD-MCNC: < 10 MG/DL — SIGNIFICANT CHANGE UP
GAS PNL BLDV: 136 MMOL/L — SIGNIFICANT CHANGE UP (ref 136–146)
GLUCOSE BLDV-MCNC: 83 MG/DL — SIGNIFICANT CHANGE UP (ref 70–99)
GLUCOSE SERPL-MCNC: 96 MG/DL — SIGNIFICANT CHANGE UP (ref 70–99)
GLUCOSE UR-MCNC: NEGATIVE — SIGNIFICANT CHANGE UP
HCO3 BLDV-SCNC: 23 MMOL/L — SIGNIFICANT CHANGE UP (ref 20–27)
HCT VFR BLD CALC: 34.7 % — SIGNIFICANT CHANGE UP (ref 34.5–45)
HCT VFR BLDV CALC: 36.9 % — SIGNIFICANT CHANGE UP (ref 34.5–45)
HGB BLD-MCNC: 11.9 G/DL — SIGNIFICANT CHANGE UP (ref 11.5–15.5)
HGB BLDV-MCNC: 12 G/DL — SIGNIFICANT CHANGE UP (ref 11.5–15.5)
IMM GRANULOCYTES NFR BLD AUTO: 0.7 % — SIGNIFICANT CHANGE UP (ref 0–1.5)
KETONES UR-MCNC: NEGATIVE — SIGNIFICANT CHANGE UP
LACTATE BLDV-MCNC: 1.4 MMOL/L — SIGNIFICANT CHANGE UP (ref 0.5–2)
LEUKOCYTE ESTERASE UR-ACNC: SIGNIFICANT CHANGE UP
LIDOCAIN IGE QN: 20.1 U/L — SIGNIFICANT CHANGE UP (ref 7–60)
LITHIUM SERPL-MCNC: 0.99 MMOL/L — SIGNIFICANT CHANGE UP (ref 0.6–1.2)
LYMPHOCYTES # BLD AUTO: 18.9 % — SIGNIFICANT CHANGE UP (ref 13–44)
LYMPHOCYTES # BLD AUTO: 2.45 K/UL — SIGNIFICANT CHANGE UP (ref 1–3.3)
MCHC RBC-ENTMCNC: 31.5 PG — SIGNIFICANT CHANGE UP (ref 27–34)
MCHC RBC-ENTMCNC: 34.3 % — SIGNIFICANT CHANGE UP (ref 32–36)
MCV RBC AUTO: 91.8 FL — SIGNIFICANT CHANGE UP (ref 80–100)
MONOCYTES # BLD AUTO: 0.7 K/UL — SIGNIFICANT CHANGE UP (ref 0–0.9)
MONOCYTES NFR BLD AUTO: 5.4 % — SIGNIFICANT CHANGE UP (ref 2–14)
MUCOUS THREADS # UR AUTO: SIGNIFICANT CHANGE UP
NEUTROPHILS # BLD AUTO: 9.5 K/UL — HIGH (ref 1.8–7.4)
NEUTROPHILS NFR BLD AUTO: 73.2 % — SIGNIFICANT CHANGE UP (ref 43–77)
NITRITE UR-MCNC: NEGATIVE — SIGNIFICANT CHANGE UP
NRBC # FLD: 0 K/UL — SIGNIFICANT CHANGE UP (ref 0–0)
PCO2 BLDV: 43 MMHG — SIGNIFICANT CHANGE UP (ref 41–51)
PH BLDV: 7.37 PH — SIGNIFICANT CHANGE UP (ref 7.32–7.43)
PH UR: 7 — SIGNIFICANT CHANGE UP (ref 5–8)
PLATELET # BLD AUTO: 361 K/UL — SIGNIFICANT CHANGE UP (ref 150–400)
PMV BLD: 10.1 FL — SIGNIFICANT CHANGE UP (ref 7–13)
PO2 BLDV: < 24 MMHG — LOW (ref 35–40)
POTASSIUM BLDV-SCNC: 3.7 MMOL/L — SIGNIFICANT CHANGE UP (ref 3.4–4.5)
POTASSIUM SERPL-MCNC: 4 MMOL/L — SIGNIFICANT CHANGE UP (ref 3.5–5.3)
POTASSIUM SERPL-SCNC: 4 MMOL/L — SIGNIFICANT CHANGE UP (ref 3.5–5.3)
PROT SERPL-MCNC: 7.3 G/DL — SIGNIFICANT CHANGE UP (ref 6–8.3)
PROT UR-MCNC: 200 — HIGH
RBC # BLD: 3.78 M/UL — LOW (ref 3.8–5.2)
RBC # FLD: 12.6 % — SIGNIFICANT CHANGE UP (ref 10.3–14.5)
SALICYLATES SERPL-MCNC: < 5 MG/DL — LOW (ref 15–30)
SAO2 % BLDV: 20.5 % — LOW (ref 60–85)
SODIUM SERPL-SCNC: 135 MMOL/L — SIGNIFICANT CHANGE UP (ref 135–145)
SP GR SPEC: 1.02 — SIGNIFICANT CHANGE UP (ref 1–1.04)
TSH SERPL-MCNC: 4.43 UIU/ML — HIGH (ref 0.27–4.2)
UROBILINOGEN FLD QL: SIGNIFICANT CHANGE UP
WBC # BLD: 12.97 K/UL — HIGH (ref 3.8–10.5)
WBC # FLD AUTO: 12.97 K/UL — HIGH (ref 3.8–10.5)
WBC UR QL: SIGNIFICANT CHANGE UP (ref 0–?)

## 2020-05-28 PROCEDURE — 71046 X-RAY EXAM CHEST 2 VIEWS: CPT | Mod: 26

## 2020-05-28 PROCEDURE — 76705 ECHO EXAM OF ABDOMEN: CPT | Mod: 26

## 2020-05-28 PROCEDURE — 70496 CT ANGIOGRAPHY HEAD: CPT | Mod: 26

## 2020-05-28 PROCEDURE — 74177 CT ABD & PELVIS W/CONTRAST: CPT | Mod: 26

## 2020-05-28 PROCEDURE — 70498 CT ANGIOGRAPHY NECK: CPT | Mod: 26

## 2020-05-28 RX ORDER — ONDANSETRON 8 MG/1
4 TABLET, FILM COATED ORAL ONCE
Refills: 0 | Status: COMPLETED | OUTPATIENT
Start: 2020-05-28 | End: 2020-05-28

## 2020-05-28 RX ORDER — NITROFURANTOIN MACROCRYSTAL 50 MG
100 CAPSULE ORAL ONCE
Refills: 0 | Status: COMPLETED | OUTPATIENT
Start: 2020-05-28 | End: 2020-05-28

## 2020-05-28 RX ORDER — SODIUM CHLORIDE 9 MG/ML
1000 INJECTION INTRAMUSCULAR; INTRAVENOUS; SUBCUTANEOUS ONCE
Refills: 0 | Status: COMPLETED | OUTPATIENT
Start: 2020-05-28 | End: 2020-05-28

## 2020-05-28 RX ADMIN — ONDANSETRON 4 MILLIGRAM(S): 8 TABLET, FILM COATED ORAL at 22:00

## 2020-05-28 RX ADMIN — SODIUM CHLORIDE 1000 MILLILITER(S): 9 INJECTION INTRAMUSCULAR; INTRAVENOUS; SUBCUTANEOUS at 22:00

## 2020-05-28 RX ADMIN — Medication 100 MILLIGRAM(S): at 20:41

## 2020-05-28 NOTE — ED PROVIDER NOTE - ATTENDING CONTRIBUTION TO CARE
Dr. Curiel:  I performed a face to face bedside interview with patient regarding history of present illness, review of symptoms and past medical history. I completed an independent physical exam.  I have discussed patient's plan of care with PA.   I agree with note as stated above, having amended the EMR as needed to reflect my findings.   This includes HISTORY OF PRESENT ILLNESS, HIV, PAST MEDICAL/SURGICAL/FAMILY/SOCIAL HISTORY, ALLERGIES AND HOME MEDICATIONS, REVIEW OF SYSTEMS, PHYSICAL EXAM, and any PROGRESS NOTES during the time I functioned as the attending physician for this patient.    25F h/o bipolar disorder, borderline personality disorder, urinary incontinence, s/p cholecystectomy 1 week prior, presents with 1 month of progressively worsening forgetfulness, word finding difficulties, and muscle twitching.  Patient herself concerned about getting tardive dyskinesia.  Patient has been seen psych NP and has had multiple medication increases recently.  In ED, initially noted to seem lean rightwards when ambulating but self=resolved.  Denies fever/chills, cp ,sob, n/v/d.    Exam:  - nad   - rrr  - ctab  - abd soft ntnd  - ambulating with steady gait, no focal neuro deficits but intermittently hesitant with words    A/P  - intermittent memory lapses and speech changes, consider effects of polypharmacy vs delirium; also consider seizure though less likely  - cbc, cmp, lithium, tox labs, ua, urine culture, cxr, CT head

## 2020-05-28 NOTE — ED PROVIDER NOTE - CLINICAL SUMMARY MEDICAL DECISION MAKING FREE TEXT BOX
25y Female with PMHx of bipolar, MDD, borderline personality disorder, urinary incontinence, s/p cholecystectomy 1 week ago presents to the ED with concern of forgetfulness, twitching/shaking, eye droop and abnormal gait. Symptoms intermittent x 1 month, worsened over the last week. Medication changes over the last month include doses to current meds and starting Myrbetriq. On OCPs. No history of blood clots, weakness, slurred speech, facial drooping or family history of stroke. Dysuria and increased urinary frequency. No fever, chills. Likely side effects of psych medications, less likely ischemic stroke or sinus venous thrombosis. Check UA for uti. Labs, UA, CT angio head and neck with IV contrast. Consult Neuro. Reassess. Likely discharge with [facility] follow up and Neuropsych information.

## 2020-05-28 NOTE — CONSULT NOTE ADULT - ASSESSMENT
25y Female with PMHx of bipolar, MDD, borderline personality disorder, urinary incontinence, s/p cholecystectomy here w/ stuttering, forgetfulness, abnormal gait for 1 month, worsened for the past week.     Recs: 25y Female with PMHx of bipolar, MDD, borderline personality disorder, urinary incontinence, s/p cholecystectomy here w/ stuttering, forgetfulness, abnormal gait for 1 month. CT head w/ volume loss, CTA H/N with no LVO. Sx Likely 2/2 medication side effects in possibly in conjunction with toxic/metabolic dysfunction, although the "spacing out" events occurring in the setting of both antipsychotic use(clozaril) as well as wellbutrin does raise concern for possible subclinical seizure, although suspicion somewhat low in my judgment. At this time, would recommend patient not drive as well as follow up with a neurologist for EEG. Discussed not driving with patient until cleared by outpatient neurologist.     Recs:  B12, TSH  Outpatient EEG  No further acute neurologic intervention at this time    Follow up with Dr. Michael Nissenbaum([phone]) outpatient    No driving, operating heavy machinery, water sports/bathing, activity in elevation without appropriate safety precautions 25y Female with PMHx of bipolar, MDD, borderline personality disorder, urinary incontinence, s/p cholecystectomy here w/ stuttering, forgetfulness, abnormal gait for 1 month. CT head w/ volume loss, CTA H/N with no LVO. Sx Likely 2/2 medication side effects in possibly in conjunction with toxic/metabolic dysfunction, although the "spacing out" events occurring in the setting of both antipsychotic use (clozaril) as well as wellbutrin does raise concern for possible subclinical seizure, although suspicion somewhat low in my judgment. At this time, would recommend patient not drive as well as follow up with a neurologist for EEG. Discussed not driving with patient until cleared by outpatient neurologist.     Recs:    B12, TSH, folate, MMA and homocysteine and copper levels   Outpatient EEG but if patient admitted will obtain routine inpatient EEG  No further acute neurologic intervention at this time    Follow up with Dr. Michael Nissenbaum([phone]) outpatient    No driving, operating heavy machinery, water sports/bathing, activity in elevation without appropriate safety precautions

## 2020-05-28 NOTE — ED PROVIDER NOTE - PROGRESS NOTE DETAILS
Bolivar PA: Pt resting comfortably, in no distress. VSS. Labs and imaging came back within normal limits. Neurology consulted. Will follow up with their recommendations. Likely discharge with [facility] Follow up. ERASTO Beaver, sign out report received from PA Fellow Bolivar, patient w/ low grade temps, appears mildly diaphoretic and nauseated, will obtain Ct Abd/pel, case signed out to ERASTO Anaya. Darren: pt signed out to me pending CTA and  neuro evaluation. CTA unremarkable but pt now has low grade fevers with vomiting and abdominal pain s/p cholecystectomy. will obtain CTAP, RUQ US, treat symptomatically and consult surgery. ERASTO Hernandez- Patient was received at sign out from ERASTO Beaver pending Abd US and CT a/p After spiking a fever a/w n/v and abdominal pain while in ED. CT a/p and US showing complex collection ? abscess vs hematoma. Surgery was consulted. State they believe findings are just post surgical. Pt also noted to have trace Leukocytes and WBC's on UA with bladder thickening and ?cystitis on CT scan. Surgery states no surgical intervention at this time would recommend medicine admission. Hospitalist called, aware of case and plan for admission for fever of unknown origin, possible cystitis/post-surgical complication. Will admit at this time. COVID also sent.

## 2020-05-28 NOTE — ED PROVIDER NOTE - OBJECTIVE STATEMENT
25y Female with PMHx of bipolar, MDD, borderline personality disorder, urinary incontinence, s/p cholecystectomy 1 week ago presents to the ED with concern of forgetfulness, twitching/shaking, eye droop and abnormal gait. Pt reports intermittent symptoms for the last month. She reports forgetting her sentences and train of thought that has worsened the last week. Mom and brother concerned about her "walking weird" and for the change in her speech (stuttering)/forgetfulness. Pt seen by a Psych NP at [facility] - symptoms have been brought to her attention in the past and she increased medications and added on new ones. Pt and family concerned for stroke - denies history of blood clots, weakness, slurred speech, facial drooping or family history of stroke. Pt reports new medication, Myrbetriq, started 1 month ago in addition to multiple changes to dosages of current medications. Pt reports dysuria and increased urinary frequency - concerned for UTI. Mild abdominal tenderness near incision sites - no erythema, drainage or signs of infection. Denies fever, chills, ha, changes in vision, fall, syncope, CP, SOB, nausea, vomiting or diarrhea.

## 2020-05-28 NOTE — ED ADULT NURSE REASSESSMENT NOTE - NS ED NURSE REASSESS COMMENT FT1
Report received from day shift RN. Pt appears to be resting comfortably in bed and in NAD at this time. MD aware of pt's vital signs. Respirations even and unlabored. Pt medicated as per MD orders. Will continue to monitor.

## 2020-05-28 NOTE — ED PROVIDER NOTE - CHIEF COMPLAINT
The patient is a 25y Female complaining of "my mom is concerned because I am stuttering, walking weird and talking weird"

## 2020-05-28 NOTE — ED PROVIDER NOTE - CARE PLAN
Principal Discharge DX:	Abdominal pain  Secondary Diagnosis:	Fever  Secondary Diagnosis:	Dysuria  Secondary Diagnosis:	Post surgical complication

## 2020-05-28 NOTE — ED PROVIDER NOTE - ABDOMINAL EXAM
mild tenderness near incision sites. no erythema, drainage, bleeding or signs of infection noted. no guarding./soft/nondistended

## 2020-05-28 NOTE — ED ADULT NURSE NOTE - OBJECTIVE STATEMENT
Patient alert and oriented x 3 and states she has been twitching and stuttering for the last few weeks.

## 2020-05-28 NOTE — ED ADULT TRIAGE NOTE - CHIEF COMPLAINT QUOTE
Pt c/o decreased concentration with lip twitching and stuttering x 1 month. Symptoms getting progressively worse.

## 2020-05-28 NOTE — ED PROVIDER NOTE - SHIFT CHANGE DETAILS
I have signed over this patient to the above attending physician. Pertinent history, physical exam findings and workup thus far in the ED have been discussed. The pending tests and plan, including surgery evaluation and recommendation were signed over.  All questions from the above attending physician have been answered.

## 2020-05-28 NOTE — ED PROVIDER NOTE - FAMILY HISTORY
Sibling  Still living? Unknown  Family history of epilepsy in brother, Age at diagnosis: Age Unknown

## 2020-05-28 NOTE — ED PROVIDER NOTE - PSYCHIATRIC ABNORMAL MOVEMENTS
TREMOR/twitching of b/l eyelids; slight twitch of lips TREMOR/b/l UE tremor. twitching of b/l eyelids; slight twitch of lips

## 2020-05-28 NOTE — ED PROVIDER NOTE - SKIN, MLM
Skin normal color for race, warm, dry and intact. No evidence of rash. Abdominal incisions - clean, dry, intact. No signs of infection.

## 2020-05-28 NOTE — CONSULT NOTE ADULT - SUBJECTIVE AND OBJECTIVE BOX
25y Female with PMHx of bipolar, MDD, borderline personality disorder, urinary incontinence, s/p cholecystectomy here w/ stuttering, forgetfulness, abnormal gait for 1 month, worsened for the past week. As per chart: "Pt reports intermittent symptoms for the last month. She reports forgetting her sentences and train of thought that has worsened the last week. Mom and brother concerned about her "walking weird" and for the change in her speech (stuttering)/forgetfulness. Pt seen by a Psych NP at [facility] - symptoms have been brought to her attention in the past and she increased medications and added on new ones. Pt and family concerned for stroke - denies history of blood clots, weakness, slurred speech, facial drooping or family history of stroke. Pt reports new medication, Myrbetriq, started 1 month ago in addition to multiple changes to dosages of current medications."    ROS: as above    PAST MEDICAL & SURGICAL HISTORY:  Urinary incontinence, unspecified type  Bipolar disorder  MDD (major depressive disorder)  S/P cholecystectomy: May 2020    FAMILY HISTORY:  Family history of epilepsy in brother (Sibling)    SOCIAL HISTORY:   T/E/D:   Occupation:   Lives with:     MEDICATIONS (HOME):  Home Medications:  acetaminophen 325 mg oral tablet: 2 tab(s) orally every 6 hours, As needed, Mild Pain (1 - 3) (24 May 2020 09:07)  Amitiza 24 mcg oral capsule: 1 cap(s) orally 2 times a day (22 May 2020 22:32)  amphetamine salt: 10 milligram(s) orally once a day (22 May 2020 22:35)  buPROPion 75 mg oral tablet: 1 tab(s) orally once a day (22 May 2020 22:33)  cholecalciferol oral tablet: 2000 unit(s) orally once a day (10 Andrea 2014 13:45)  cloZAPine 100 mg oral tablet: 3 tab(s) orally once a day (at bedtime) (22 May 2020 22:25)  cloZAPine 25 mg oral tablet: 1 tab(s) orally once a day (22 May 2020 22:25)  dextroamphetamine-amphetamine 5 mg oral tablet: 1 tab(s) orally once a day (22 May 2020 22:33)  fluvoxaMINE: 100 milligram(s) orally once a day (at bedtime) (22 May 2020 22:23)  lithium 300 mg oral tablet, extended release: 1 tab(s) orally once a day (10 Andrea 2014 13:45)  lithium 300 mg oral tablet, extended release: 2 tab(s) orally once a day (at bedtime) (22 May 2020 22:09)  LORazepam 2 mg oral tablet: 1 tab(s) orally once a day (at bedtime) (22 May 2020 22:30)  melatonin 3 mg oral tablet: 2 tab(s) orally , As needed, Insomnia (10 Andrea 2014 13:45)  mybetriq: 50 milligram(s) orally once a day (22 May 2020 22:32)  notrel: 1 tab(s) orally once a day (22 May 2020 22:31)  traZODone 100 mg oral tablet: 2 tab(s) orally once a day (at bedtime) (22 May 2020 22:26)    MEDICATIONS  (STANDING):    MEDICATIONS  (PRN):    ALLERGIES/INTOLERANCES:  Allergies  No Known Allergies    Intolerances    VITALS & EXAMINATION:  Vital Signs Last 24 Hrs  T(C): 36.7 (28 May 2020 15:48), Max: 36.7 (28 May 2020 15:48)  T(F): 98 (28 May 2020 15:48), Max: 98 (28 May 2020 15:48)  HR: 101 (28 May 2020 15:48) (101 - 101)  BP: 104/69 (28 May 2020 15:48) (104/69 - 104/69)  BP(mean): --  RR: 16 (28 May 2020 15:48) (16 - 16)  SpO2: 99% (28 May 2020 15:48) (99% - 99%)    Neurological (>12):      LABORATORY:  CBC                       11.9   12.97 )-----------( 361      ( 28 May 2020 16:20 )             34.7     Chem 05-28    135  |  103  |  11  ----------------------------<  96  4.0   |  20<L>  |  0.92    Ca    9.7      28 May 2020 16:20    TPro  7.3  /  Alb  4.1  /  TBili  < 0.2<L>  /  DBili  x   /  AST  19  /  ALT  25  /  AlkPhos  74  05-28    LFTs LIVER FUNCTIONS - ( 28 May 2020 16:20 )  Alb: 4.1 g/dL / Pro: 7.3 g/dL / ALK PHOS: 74 u/L / ALT: 25 u/L / AST: 19 u/L / GGT: x           Coagulopathy   Lipid Panel   A1c   Cardiac enzymes     U/A Urinalysis Basic - ( 28 May 2020 16:15 )    Color: YELLOW / Appearance: Lt TURBID / S.023 / pH: 7.0  Gluc: NEGATIVE / Ketone: NEGATIVE  / Bili: NEGATIVE / Urobili: TRACE   Blood: NEGATIVE / Protein: 200 / Nitrite: NEGATIVE   Leuk Esterase: TRACE / RBC: x / WBC x   Sq Epi: x / Non Sq Epi: x / Bacteria: x      CSF  Immunological  Other    STUDIES & IMAGING:  Studies (EKG, EEG, EMG, etc):     Radiology (XR, CT, MR, U/S, TTE/JERRI): 25y Female with PMHx of bipolar, MDD, borderline personality disorder, urinary incontinence, s/p cholecystectomy here w/ stuttering, forgetfulness, abnormal gait for 1 month, worsened for the past week. As per chart: "Pt reports intermittent symptoms for the last month. She reports forgetting her sentences and train of thought that has worsened the last week. Mom and brother concerned about her "walking weird" and for the change in her speech (stuttering)/forgetfulness. Pt seen by a Psych NP at [facility] - symptoms have been brought to her attention in the past and she increased medications and added on new ones. Pt and family concerned for stroke - denies history of blood clots, weakness, slurred speech, facial drooping or family history of stroke. Pt reports new medication, Myrbetriq, started 1 month ago in addition to multiple changes to dosages of current medications." As per patient, came to hospital due to pain post surgery as well as "L shoulder slump and eye slump" as per patients mother(although patient does not agree or see any difference from prior), has been stuttering for the past few weeks; constant, intermittent. Started Luvox and Wellbutrin around a month ago, also taking clozaril. Patient also reports zoning out multiple times at day, occurring most commonly during conversation. She does not report or know of any events while driving, no loss of consciousness, no near accidents during activities or driving. Also reports worsening urinary incontinence for the past month, occurring at night growing greater in quantity.     ROS: as above    PAST MEDICAL & SURGICAL HISTORY:  Urinary incontinence, unspecified type  Bipolar disorder  MDD (major depressive disorder)  S/P cholecystectomy: May 2020    FAMILY HISTORY:  Family history of epilepsy in brother (Sibling)    SOCIAL HISTORY:   T/E/D:   Occupation:   Lives with:     MEDICATIONS (HOME):  Home Medications:  acetaminophen 325 mg oral tablet: 2 tab(s) orally every 6 hours, As needed, Mild Pain (1 - 3) (24 May 2020 09:07)  Amitiza 24 mcg oral capsule: 1 cap(s) orally 2 times a day (22 May 2020 22:32)  amphetamine salt: 10 milligram(s) orally once a day (22 May 2020 22:35)  buPROPion 75 mg oral tablet: 1 tab(s) orally once a day (22 May 2020 22:33)  cholecalciferol oral tablet: 2000 unit(s) orally once a day (10 Andrea 2014 13:45)  cloZAPine 100 mg oral tablet: 3 tab(s) orally once a day (at bedtime) (22 May 2020 22:25)  cloZAPine 25 mg oral tablet: 1 tab(s) orally once a day (22 May 2020 22:25)  dextroamphetamine-amphetamine 5 mg oral tablet: 1 tab(s) orally once a day (22 May 2020 22:33)  fluvoxaMINE: 100 milligram(s) orally once a day (at bedtime) (22 May 2020 22:23)  lithium 300 mg oral tablet, extended release: 1 tab(s) orally once a day (10 Andrea 2014 13:45)  lithium 300 mg oral tablet, extended release: 2 tab(s) orally once a day (at bedtime) (22 May 2020 22:09)  LORazepam 2 mg oral tablet: 1 tab(s) orally once a day (at bedtime) (22 May 2020 22:30)  melatonin 3 mg oral tablet: 2 tab(s) orally , As needed, Insomnia (10 Andrea 2014 13:45)  mybetriq: 50 milligram(s) orally once a day (22 May 2020 22:32)  notrel: 1 tab(s) orally once a day (22 May 2020 22:31)  traZODone 100 mg oral tablet: 2 tab(s) orally once a day (at bedtime) (22 May 2020 22:26)    MEDICATIONS  (STANDING):    MEDICATIONS  (PRN):    ALLERGIES/INTOLERANCES:  Allergies  No Known Allergies    Intolerances    VITALS & EXAMINATION:  Vital Signs Last 24 Hrs  T(C): 36.7 (28 May 2020 15:48), Max: 36.7 (28 May 2020 15:48)  T(F): 98 (28 May 2020 15:48), Max: 98 (28 May 2020 15:48)  HR: 101 (28 May 2020 15:48) (101 - 101)  BP: 104/69 (28 May 2020 15:48) (104/69 - 104/69)  BP(mean): --  RR: 16 (28 May 2020 15:48) (16 - 16)  SpO2: 99% (28 May 2020 15:48) (99% - 99%)    Neurological (>12):  MS: Awake, alert, oriented to person, place, situation, time. Normal affect. Follows all commands.    Language: Speech is clear, fluent with good repetition & comprehension     CNs: EOMI, VFF. well developed masseter muscles b/l. No facial asymmetry b/l. Symmetric palate elevation in midline. Gag reflex deferred. Tongue midline, normal movements, no atrophy.    Motor: Normal muscle bulk & tone. No noticeable tremor or seizure.  Able to lift BUE > 10 secs w/o drift  Able to lift BLE > 5 secs w/o drift    Coordination: Bilateral FTN and RACHID intact      LABORATORY:  CBC                       11.9   12.97 )-----------( 361      ( 28 May 2020 16:20 )             34.7     Chem -    135  |  103  |  11  ----------------------------<  96  4.0   |  20<L>  |  0.92    Ca    9.7      28 May 2020 16:20    TPro  7.3  /  Alb  4.1  /  TBili  < 0.2<L>  /  DBili  x   /  AST  19  /  ALT  25  /  AlkPhos  74      LFTs LIVER FUNCTIONS - ( 28 May 2020 16:20 )  Alb: 4.1 g/dL / Pro: 7.3 g/dL / ALK PHOS: 74 u/L / ALT: 25 u/L / AST: 19 u/L / GGT: x           Coagulopathy   Lipid Panel   A1c   Cardiac enzymes     U/A Urinalysis Basic - ( 28 May 2020 16:15 )    Color: YELLOW / Appearance: Lt TURBID / S.023 / pH: 7.0  Gluc: NEGATIVE / Ketone: NEGATIVE  / Bili: NEGATIVE / Urobili: TRACE   Blood: NEGATIVE / Protein: 200 / Nitrite: NEGATIVE   Leuk Esterase: TRACE / RBC: x / WBC x   Sq Epi: x / Non Sq Epi: x / Bacteria: x      CSF  Immunological  Other    STUDIES & IMAGING:  Studies (EKG, EEG, EMG, etc):     Radiology (XR, CT, MR, U/S, TTE/JERRI):

## 2020-05-29 DIAGNOSIS — F31.70 BIPOLAR DISORDER, CURRENTLY IN REMISSION, MOST RECENT EPISODE UNSPECIFIED: ICD-10-CM

## 2020-05-29 DIAGNOSIS — Z86.59 PERSONAL HISTORY OF OTHER MENTAL AND BEHAVIORAL DISORDERS: ICD-10-CM

## 2020-05-29 DIAGNOSIS — F32.9 MAJOR DEPRESSIVE DISORDER, SINGLE EPISODE, UNSPECIFIED: ICD-10-CM

## 2020-05-29 DIAGNOSIS — R10.11 RIGHT UPPER QUADRANT PAIN: ICD-10-CM

## 2020-05-29 DIAGNOSIS — Z02.9 ENCOUNTER FOR ADMINISTRATIVE EXAMINATIONS, UNSPECIFIED: ICD-10-CM

## 2020-05-29 DIAGNOSIS — R32 UNSPECIFIED URINARY INCONTINENCE: ICD-10-CM

## 2020-05-29 DIAGNOSIS — Z79.899 OTHER LONG TERM (CURRENT) DRUG THERAPY: ICD-10-CM

## 2020-05-29 DIAGNOSIS — F31.9 BIPOLAR DISORDER, UNSPECIFIED: ICD-10-CM

## 2020-05-29 DIAGNOSIS — R30.0 DYSURIA: ICD-10-CM

## 2020-05-29 DIAGNOSIS — R47.89 OTHER SPEECH DISTURBANCES: ICD-10-CM

## 2020-05-29 DIAGNOSIS — R10.9 UNSPECIFIED ABDOMINAL PAIN: ICD-10-CM

## 2020-05-29 DIAGNOSIS — Z29.9 ENCOUNTER FOR PROPHYLACTIC MEASURES, UNSPECIFIED: ICD-10-CM

## 2020-05-29 LAB
ALBUMIN SERPL ELPH-MCNC: 3.4 G/DL — SIGNIFICANT CHANGE UP (ref 3.3–5)
ALP SERPL-CCNC: 67 U/L — SIGNIFICANT CHANGE UP (ref 40–120)
ALT FLD-CCNC: 17 U/L — SIGNIFICANT CHANGE UP (ref 4–33)
ANION GAP SERPL CALC-SCNC: 12 MMO/L — SIGNIFICANT CHANGE UP (ref 7–14)
AST SERPL-CCNC: 17 U/L — SIGNIFICANT CHANGE UP (ref 4–32)
BASOPHILS # BLD AUTO: 0.05 K/UL — SIGNIFICANT CHANGE UP (ref 0–0.2)
BASOPHILS NFR BLD AUTO: 0.4 % — SIGNIFICANT CHANGE UP (ref 0–2)
BILIRUB SERPL-MCNC: < 0.2 MG/DL — LOW (ref 0.2–1.2)
BUN SERPL-MCNC: 7 MG/DL — SIGNIFICANT CHANGE UP (ref 7–23)
CALCIUM SERPL-MCNC: 8.2 MG/DL — LOW (ref 8.4–10.5)
CHLORIDE SERPL-SCNC: 109 MMOL/L — HIGH (ref 98–107)
CO2 SERPL-SCNC: 18 MMOL/L — LOW (ref 22–31)
CREAT SERPL-MCNC: 0.71 MG/DL — SIGNIFICANT CHANGE UP (ref 0.5–1.3)
EOSINOPHIL # BLD AUTO: 0.26 K/UL — SIGNIFICANT CHANGE UP (ref 0–0.5)
EOSINOPHIL NFR BLD AUTO: 2.2 % — SIGNIFICANT CHANGE UP (ref 0–6)
GLUCOSE SERPL-MCNC: 115 MG/DL — HIGH (ref 70–99)
HCT VFR BLD CALC: 27.8 % — LOW (ref 34.5–45)
HGB BLD-MCNC: 9.9 G/DL — LOW (ref 11.5–15.5)
IMM GRANULOCYTES NFR BLD AUTO: 0.7 % — SIGNIFICANT CHANGE UP (ref 0–1.5)
LYMPHOCYTES # BLD AUTO: 2.84 K/UL — SIGNIFICANT CHANGE UP (ref 1–3.3)
LYMPHOCYTES # BLD AUTO: 24.3 % — SIGNIFICANT CHANGE UP (ref 13–44)
MAGNESIUM SERPL-MCNC: 2.2 MG/DL — SIGNIFICANT CHANGE UP (ref 1.6–2.6)
MCHC RBC-ENTMCNC: 32.9 PG — SIGNIFICANT CHANGE UP (ref 27–34)
MCHC RBC-ENTMCNC: 35.6 % — SIGNIFICANT CHANGE UP (ref 32–36)
MCV RBC AUTO: 92.4 FL — SIGNIFICANT CHANGE UP (ref 80–100)
MONOCYTES # BLD AUTO: 0.89 K/UL — SIGNIFICANT CHANGE UP (ref 0–0.9)
MONOCYTES NFR BLD AUTO: 7.6 % — SIGNIFICANT CHANGE UP (ref 2–14)
NEUTROPHILS # BLD AUTO: 7.57 K/UL — HIGH (ref 1.8–7.4)
NEUTROPHILS NFR BLD AUTO: 64.8 % — SIGNIFICANT CHANGE UP (ref 43–77)
NRBC # FLD: 0 K/UL — SIGNIFICANT CHANGE UP (ref 0–0)
PHOSPHATE SERPL-MCNC: 2.6 MG/DL — SIGNIFICANT CHANGE UP (ref 2.5–4.5)
PLATELET # BLD AUTO: 358 K/UL — SIGNIFICANT CHANGE UP (ref 150–400)
PMV BLD: 10.5 FL — SIGNIFICANT CHANGE UP (ref 7–13)
POTASSIUM SERPL-MCNC: 3.8 MMOL/L — SIGNIFICANT CHANGE UP (ref 3.5–5.3)
POTASSIUM SERPL-SCNC: 3.8 MMOL/L — SIGNIFICANT CHANGE UP (ref 3.5–5.3)
PROCALCITONIN SERPL-MCNC: 0.07 NG/ML — SIGNIFICANT CHANGE UP (ref 0.02–0.1)
PROT SERPL-MCNC: 6.5 G/DL — SIGNIFICANT CHANGE UP (ref 6–8.3)
RBC # BLD: 3.01 M/UL — LOW (ref 3.8–5.2)
RBC # FLD: 13.1 % — SIGNIFICANT CHANGE UP (ref 10.3–14.5)
SARS-COV-2 RNA SPEC QL NAA+PROBE: SIGNIFICANT CHANGE UP
SODIUM SERPL-SCNC: 139 MMOL/L — SIGNIFICANT CHANGE UP (ref 135–145)
TSH SERPL-MCNC: 8.97 UIU/ML — HIGH (ref 0.27–4.2)
VIT B12 SERPL-MCNC: 329 PG/ML — SIGNIFICANT CHANGE UP (ref 200–900)
WBC # BLD: 11.69 K/UL — HIGH (ref 3.8–10.5)
WBC # FLD AUTO: 11.69 K/UL — HIGH (ref 3.8–10.5)

## 2020-05-29 PROCEDURE — 99255 IP/OBS CONSLTJ NEW/EST HI 80: CPT | Mod: GC

## 2020-05-29 PROCEDURE — 99233 SBSQ HOSP IP/OBS HIGH 50: CPT | Mod: GC

## 2020-05-29 PROCEDURE — 95816 EEG AWAKE AND DROWSY: CPT | Mod: 26

## 2020-05-29 PROCEDURE — 99223 1ST HOSP IP/OBS HIGH 75: CPT

## 2020-05-29 PROCEDURE — 12345: CPT | Mod: NC,GC

## 2020-05-29 RX ORDER — LITHIUM CARBONATE 300 MG/1
300 TABLET, EXTENDED RELEASE ORAL DAILY
Refills: 0 | Status: DISCONTINUED | OUTPATIENT
Start: 2020-05-29 | End: 2020-06-05

## 2020-05-29 RX ORDER — KETOROLAC TROMETHAMINE 30 MG/ML
15 SYRINGE (ML) INJECTION ONCE
Refills: 0 | Status: DISCONTINUED | OUTPATIENT
Start: 2020-05-29 | End: 2020-05-29

## 2020-05-29 RX ORDER — SODIUM CHLORIDE 9 MG/ML
500 INJECTION INTRAMUSCULAR; INTRAVENOUS; SUBCUTANEOUS ONCE
Refills: 0 | Status: COMPLETED | OUTPATIENT
Start: 2020-05-29 | End: 2020-05-29

## 2020-05-29 RX ORDER — DEXTROAMPHETAMINE SACCHARATE, AMPHETAMINE ASPARTATE, DEXTROAMPHETAMINE SULFATE AND AMPHETAMINE SULFATE 1.875; 1.875; 1.875; 1.875 MG/1; MG/1; MG/1; MG/1
10 TABLET ORAL DAILY
Refills: 0 | Status: DISCONTINUED | OUTPATIENT
Start: 2020-05-29 | End: 2020-05-29

## 2020-05-29 RX ORDER — DEXTROAMPHETAMINE SACCHARATE, AMPHETAMINE ASPARTATE, DEXTROAMPHETAMINE SULFATE AND AMPHETAMINE SULFATE 1.875; 1.875; 1.875; 1.875 MG/1; MG/1; MG/1; MG/1
5 TABLET ORAL
Refills: 0 | Status: DISCONTINUED | OUTPATIENT
Start: 2020-05-29 | End: 2020-05-29

## 2020-05-29 RX ORDER — PIPERACILLIN AND TAZOBACTAM 4; .5 G/20ML; G/20ML
3.38 INJECTION, POWDER, LYOPHILIZED, FOR SOLUTION INTRAVENOUS ONCE
Refills: 0 | Status: COMPLETED | OUTPATIENT
Start: 2020-05-29 | End: 2020-05-29

## 2020-05-29 RX ORDER — SODIUM CHLORIDE 9 MG/ML
1000 INJECTION INTRAMUSCULAR; INTRAVENOUS; SUBCUTANEOUS ONCE
Refills: 0 | Status: COMPLETED | OUTPATIENT
Start: 2020-05-29 | End: 2020-05-29

## 2020-05-29 RX ORDER — OXYCODONE HYDROCHLORIDE 5 MG/1
10 TABLET ORAL EVERY 4 HOURS
Refills: 0 | Status: DISCONTINUED | OUTPATIENT
Start: 2020-05-29 | End: 2020-05-30

## 2020-05-29 RX ORDER — SENNA PLUS 8.6 MG/1
2 TABLET ORAL AT BEDTIME
Refills: 0 | Status: DISCONTINUED | OUTPATIENT
Start: 2020-05-29 | End: 2020-06-05

## 2020-05-29 RX ORDER — CHOLECALCIFEROL (VITAMIN D3) 125 MCG
2000 CAPSULE ORAL DAILY
Refills: 0 | Status: DISCONTINUED | OUTPATIENT
Start: 2020-05-29 | End: 2020-06-05

## 2020-05-29 RX ORDER — CLOZAPINE 150 MG/1
300 TABLET, ORALLY DISINTEGRATING ORAL AT BEDTIME
Refills: 0 | Status: DISCONTINUED | OUTPATIENT
Start: 2020-05-29 | End: 2020-05-30

## 2020-05-29 RX ORDER — FLUVOXAMINE MALEATE 25 MG/1
50 TABLET ORAL AT BEDTIME
Refills: 0 | Status: COMPLETED | OUTPATIENT
Start: 2020-05-29 | End: 2020-05-31

## 2020-05-29 RX ORDER — ONDANSETRON 8 MG/1
4 TABLET, FILM COATED ORAL THREE TIMES A DAY
Refills: 0 | Status: DISCONTINUED | OUTPATIENT
Start: 2020-05-29 | End: 2020-06-05

## 2020-05-29 RX ORDER — LITHIUM CARBONATE 300 MG/1
600 TABLET, EXTENDED RELEASE ORAL AT BEDTIME
Refills: 0 | Status: DISCONTINUED | OUTPATIENT
Start: 2020-05-29 | End: 2020-06-05

## 2020-05-29 RX ORDER — ZONISAMIDE 100 MG
100 CAPSULE ORAL AT BEDTIME
Refills: 0 | Status: DISCONTINUED | OUTPATIENT
Start: 2020-05-29 | End: 2020-05-30

## 2020-05-29 RX ORDER — CLOZAPINE 150 MG/1
25 TABLET, ORALLY DISINTEGRATING ORAL DAILY
Refills: 0 | Status: DISCONTINUED | OUTPATIENT
Start: 2020-05-29 | End: 2020-05-30

## 2020-05-29 RX ORDER — FLUVOXAMINE MALEATE 25 MG/1
100 TABLET ORAL AT BEDTIME
Refills: 0 | Status: DISCONTINUED | OUTPATIENT
Start: 2020-05-29 | End: 2020-05-29

## 2020-05-29 RX ORDER — TRAZODONE HCL 50 MG
200 TABLET ORAL AT BEDTIME
Refills: 0 | Status: DISCONTINUED | OUTPATIENT
Start: 2020-05-29 | End: 2020-05-30

## 2020-05-29 RX ORDER — BUPROPION HYDROCHLORIDE 150 MG/1
150 TABLET, EXTENDED RELEASE ORAL DAILY
Refills: 0 | Status: DISCONTINUED | OUTPATIENT
Start: 2020-05-29 | End: 2020-05-29

## 2020-05-29 RX ORDER — LUBIPROSTONE 24 UG/1
1 CAPSULE, GELATIN COATED ORAL
Qty: 0 | Refills: 0 | DISCHARGE

## 2020-05-29 RX ORDER — OXYCODONE HYDROCHLORIDE 5 MG/1
5 TABLET ORAL EVERY 4 HOURS
Refills: 0 | Status: DISCONTINUED | OUTPATIENT
Start: 2020-05-29 | End: 2020-05-30

## 2020-05-29 RX ADMIN — Medication 2 MILLIGRAM(S): at 22:04

## 2020-05-29 RX ADMIN — FLUVOXAMINE MALEATE 50 MILLIGRAM(S): 25 TABLET ORAL at 22:04

## 2020-05-29 RX ADMIN — CLOZAPINE 300 MILLIGRAM(S): 150 TABLET, ORALLY DISINTEGRATING ORAL at 22:04

## 2020-05-29 RX ADMIN — LITHIUM CARBONATE 300 MILLIGRAM(S): 300 TABLET, EXTENDED RELEASE ORAL at 15:52

## 2020-05-29 RX ADMIN — Medication 100 MILLIGRAM(S): at 22:04

## 2020-05-29 RX ADMIN — Medication 2000 UNIT(S): at 13:00

## 2020-05-29 RX ADMIN — SENNA PLUS 2 TABLET(S): 8.6 TABLET ORAL at 22:04

## 2020-05-29 RX ADMIN — SODIUM CHLORIDE 500 MILLILITER(S): 9 INJECTION INTRAMUSCULAR; INTRAVENOUS; SUBCUTANEOUS at 11:27

## 2020-05-29 RX ADMIN — PIPERACILLIN AND TAZOBACTAM 200 GRAM(S): 4; .5 INJECTION, POWDER, LYOPHILIZED, FOR SOLUTION INTRAVENOUS at 00:20

## 2020-05-29 RX ADMIN — Medication 200 MILLIGRAM(S): at 22:04

## 2020-05-29 RX ADMIN — DEXTROAMPHETAMINE SACCHARATE, AMPHETAMINE ASPARTATE, DEXTROAMPHETAMINE SULFATE AND AMPHETAMINE SULFATE 10 MILLIGRAM(S): 1.875; 1.875; 1.875; 1.875 TABLET ORAL at 13:00

## 2020-05-29 RX ADMIN — SODIUM CHLORIDE 500 MILLILITER(S): 9 INJECTION INTRAMUSCULAR; INTRAVENOUS; SUBCUTANEOUS at 13:01

## 2020-05-29 RX ADMIN — DEXTROAMPHETAMINE SACCHARATE, AMPHETAMINE ASPARTATE, DEXTROAMPHETAMINE SULFATE AND AMPHETAMINE SULFATE 5 MILLIGRAM(S): 1.875; 1.875; 1.875; 1.875 TABLET ORAL at 10:02

## 2020-05-29 RX ADMIN — LITHIUM CARBONATE 600 MILLIGRAM(S): 300 TABLET, EXTENDED RELEASE ORAL at 22:04

## 2020-05-29 RX ADMIN — Medication 15 MILLIGRAM(S): at 00:50

## 2020-05-29 RX ADMIN — SODIUM CHLORIDE 1000 MILLILITER(S): 9 INJECTION INTRAMUSCULAR; INTRAVENOUS; SUBCUTANEOUS at 00:20

## 2020-05-29 RX ADMIN — CLOZAPINE 25 MILLIGRAM(S): 150 TABLET, ORALLY DISINTEGRATING ORAL at 15:52

## 2020-05-29 NOTE — PROGRESS NOTE ADULT - SUBJECTIVE AND OBJECTIVE BOX
Author:   Pernell Amaya MD  Internal Medicine, PGY1  [phone]    Patient:  SUSI COOPER  [id number]    Progress Note    Interval events: No acute events.  Pertinent ROS (if any):      Administered:  (Floorstock): 1 Each &lt;See Task&gt; ( @ 01:08)  ketorolac   Injectable: 15 milliGRAM(s) IV Push ( @ 00:50)  (Floorstock): 1 Each &lt;See Task&gt; ( @ 00:46)  sodium chloride 0.9% Bolus: 1000 mL/Hr IV Bolus ( @ 00:20)  piperacillin/tazobactam IVPB...: 200 mL/Hr IV Intermittent ( 00:20)  (Floorstock): 1 Each &lt;See Task&gt; ( @ 00:12)  sodium chloride 0.9% Bolus: 1000 mL/Hr IV Bolus ( @ 22:00)  ondansetron Injectable: 4 milliGRAM(s) IV Push ( @ 22:00)  (Floorstock): 1 Each &lt;See Task&gt; ( @ 21:50)  nitrofurantoin monohydrate/macrocrystals (MACROBID): 100 milliGRAM(s) Oral ( @ 20:41)  (Floorstock): 1 Each &lt;See Task&gt; ( @ 20:34)        OBJECTIVE:    CAPILLARY BLOOD GLUCOSE            VITALS:  T(F): 98.1 (20 @ 03:32), Max: 100.1 (20 @ 19:30)  HR: 87 (20 @ 06:39) (87 - 101)  BP: 92/51 (20 @ 06:39) (92/51 - 108/69)  RR: 16 (20 @ 06:39) (15 - 16)  SpO2: 98% (20 @ 06:39) (98% - 100%)    PHYSICAL EXAM:  GENERAL: NAD, lying in bed comfortably  HEAD:  Atraumatic, Normocephalic  EYES: EOMI, PERRLA, conjunctiva and sclera clear  ENT: Moist mucous membranes  NECK: Supple, No JVD  CHEST/LUNG: Clear to auscultation bilaterally; No rales, rhonchi, wheezing, or rubs. Unlabored respirations  HEART: Regular rate and rhythm; No murmurs, rubs, or gallops  ABDOMEN: Bowel sounds present; Soft, Nontender, Nondistended. No hepatomegaly  EXTREMITIES:  2+ Peripheral Pulses, brisk capillary refill. No clubbing, cyanosis, or edema  NERVOUS SYSTEM:  Alert & Oriented X3, speech clear. No deficits   MSK: FROM all 4 extremities, full and equal strength  SKIN: No rashes or lesions    HOSPITAL MEDICATIONS:  Standing Meds:  amphetamine/dextroamphetamine 10 milliGRAM(s) Oral daily  amphetamine/dextroamphetamine XR 5 milliGRAM(s) Oral with breakfast  buPROPion XL . 150 milliGRAM(s) Oral daily  cholecalciferol 2000 Unit(s) Oral daily  cloZAPine 300 milliGRAM(s) Oral at bedtime  cloZAPine 25 milliGRAM(s) Oral daily  fluvoxaMINE 100 milliGRAM(s) Oral at bedtime  lithium 300 milliGRAM(s) Oral daily  lithium 600 milliGRAM(s) Oral at bedtime  LORazepam     Tablet 2 milliGRAM(s) Oral at bedtime  traZODone 200 milliGRAM(s) Oral at bedtime      PRN Meds:  ondansetron Injectable 4 milliGRAM(s) IV Push three times a day PRN  oxyCODONE    IR 5 milliGRAM(s) Oral every 4 hours PRN  oxyCODONE    IR 10 milliGRAM(s) Oral every 4 hours PRN      LABS:  CBC 20 @ 06:10                        9.9    11.69 )-----------( 358                   27.8       Hgb trend: 9.9 <-- , 11.9 <--   WBC trend: 11.69 <-- , 12.97 <--       CMP 20 @ 06:10    139  |  109<H>  |  7   ----------------------------<  115<H>  3.8   |  18<L>  |  0.71    Ca    8.2<L>      20 @ 06:10  Phos  2.6       Mg     2.2         TPro  6.5  /  Alb  3.4  /  TBili  < 0.2<L>  /  DBili  x   /  AST  17  /  ALT  17  /  AlkPhos  67        Serum Cr trend: 0.71 <-- , 0.92 <--       Urinalysis Basic - ( 28 May 2020 16:15 )    Color: YELLOW / Appearance: Lt TURBID / S.023 / pH: 7.0  Gluc: NEGATIVE / Ketone: NEGATIVE  / Bili: NEGATIVE / Urobili: TRACE   Blood: NEGATIVE / Protein: 200 / Nitrite: NEGATIVE   Leuk Esterase: TRACE / RBC: x / WBC 10-20   Sq Epi: x / Non Sq Epi: FEW / Bacteria: SMALL      ABG Trend:     VBG 20 @ 17:13 - pH: 7.37  | pCO2: 43    | pO2: < 24  | Lactate: 1.4        MICROBIOLOGY:       RADIOLOGY:  [ ] Reviewed and interpreted by me    EKG: Author:   Pernell Amaya MD  Internal Medicine, PGY1  [phone]    Patient:  SUSI COOPER  [id number]    Progress Note    Interval events: No acute events. Spoke to patient today about her presenting complaints. She states she has had 1 year of urinary incontinence which is worse in the last 2 weeks, as well as 6 weeks of worsening abdominal pain. She states she follows with a psychiatrist regularly and takes all of her medications.     Pertinent ROS (if any):      Administered:  (Floorstock): 1 Each &lt;See Task&gt; ( @ 01:08)  ketorolac   Injectable: 15 milliGRAM(s) IV Push ( @ 00:50)  (Floorstock): 1 Each &lt;See Task&gt; ( @ 00:46)  sodium chloride 0.9% Bolus: 1000 mL/Hr IV Bolus ( @ 00:20)  piperacillin/tazobactam IVPB...: 200 mL/Hr IV Intermittent ( 00:20)  (Floorstock): 1 Each &lt;See Task&gt; ( @ 00:12)  sodium chloride 0.9% Bolus: 1000 mL/Hr IV Bolus ( @ 22:00)  ondansetron Injectable: 4 milliGRAM(s) IV Push ( @ 22:00)  (Floorstock): 1 Each &lt;See Task&gt; ( @ 21:50)  nitrofurantoin monohydrate/macrocrystals (MACROBID): 100 milliGRAM(s) Oral ( 20:41)  (Floorstock): 1 Each &lt;See Task&gt; ( @ 20:34)        OBJECTIVE:    CAPILLARY BLOOD GLUCOSE            VITALS:  T(F): 98.1 (20 @ 03:32), Max: 100.1 (20 @ 19:30)  HR: 87 (20 @ 06:39) (87 - 101)  BP: 92/51 (20 @ 06:39) (92/51 - 108/69)  RR: 16 (20 @ 06:39) (15 - 16)  SpO2: 98% (20 @ 06:39) (98% - 100%)    PHYSICAL EXAM:  GENERAL: NAD, lying in bed comfortably, answering questions with eyes closed at first and drowsy, but opens eyes when asked and follows commands  HEAD:  Atraumatic, Normocephalic  EYES: EOMI, PERRLA, conjunctiva and sclera clear  ENT: Moist mucous membranes  CHEST/LUNG: Clear to auscultation bilaterally; No rales, rhonchi, wheezing, or rubs. Unlabored respirations  HEART: Regular rate and rhythm; No murmurs, rubs, or gallops  ABDOMEN: Bowel sounds present; Soft, +TTP R side, worse RUQ, Nondistended. No hepatomegaly  EXTREMITIES:  2+ Peripheral Pulses, brisk capillary refill. No clubbing, cyanosis, or edema  NERVOUS SYSTEM:  Alert & Oriented X person, partially to place ("hospital"), and year, but stated President was Emily, speech clear. CN's grossly intact  PSYCH: No active SI    HOSPITAL MEDICATIONS:  Standing Meds:  amphetamine/dextroamphetamine 10 milliGRAM(s) Oral daily  amphetamine/dextroamphetamine XR 5 milliGRAM(s) Oral with breakfast  buPROPion XL . 150 milliGRAM(s) Oral daily  cholecalciferol 2000 Unit(s) Oral daily  cloZAPine 300 milliGRAM(s) Oral at bedtime  cloZAPine 25 milliGRAM(s) Oral daily  fluvoxaMINE 100 milliGRAM(s) Oral at bedtime  lithium 300 milliGRAM(s) Oral daily  lithium 600 milliGRAM(s) Oral at bedtime  LORazepam     Tablet 2 milliGRAM(s) Oral at bedtime  traZODone 200 milliGRAM(s) Oral at bedtime      PRN Meds:  ondansetron Injectable 4 milliGRAM(s) IV Push three times a day PRN  oxyCODONE    IR 5 milliGRAM(s) Oral every 4 hours PRN  oxyCODONE    IR 10 milliGRAM(s) Oral every 4 hours PRN      LABS:  CBC 20 @ 06:10                        9.9    11.69 )-----------( 358                   27.8       Hgb trend: 9.9 <-- , 11.9 <--   WBC trend: 11.69 <-- , 12.97 <--       CMP 05-29-20 @ 06:10    139  |  109<H>  |  7   ----------------------------<  115<H>  3.8   |  18<L>  |  0.71    Ca    8.2<L>      20 @ 06:10  Phos  2.6       Mg     2.2         TPro  6.5  /  Alb  3.4  /  TBili  < 0.2<L>  /  DBili  x   /  AST  17  /  ALT  17  /  AlkPhos  67        Serum Cr trend: 0.71 <-- , 0.92 <--       Urinalysis Basic - ( 28 May 2020 16:15 )    Color: YELLOW / Appearance: Lt TURBID / S.023 / pH: 7.0  Gluc: NEGATIVE / Ketone: NEGATIVE  / Bili: NEGATIVE / Urobili: TRACE   Blood: NEGATIVE / Protein: 200 / Nitrite: NEGATIVE   Leuk Esterase: TRACE / RBC: x / WBC 10-20   Sq Epi: x / Non Sq Epi: FEW / Bacteria: SMALL      ABG Trend:     VBG 20 @ 17:13 - pH: 7.37  | pCO2: 43    | pO2: < 24  | Lactate: 1.4        MICROBIOLOGY:       RADIOLOGY:  [ ] Reviewed and interpreted by me    EKG:

## 2020-05-29 NOTE — BEHAVIORAL HEALTH ASSESSMENT NOTE - NSBHCHARTREVIEWLAB_PSY_A_CORE FT
CBC Full  -  ( 29 May 2020 06:10 )  WBC Count : 11.69 K/uL  RBC Count : 3.01 M/uL  Hemoglobin : 9.9 g/dL  Hematocrit : 27.8 %  Platelet Count - Automated : 358 K/uL  Mean Cell Volume : 92.4 fL  Mean Cell Hemoglobin : 32.9 pg  Mean Cell Hemoglobin Concentration : 35.6 %  Auto Neutrophil # : 7.57 K/uL  Auto Lymphocyte # : 2.84 K/uL  Auto Monocyte # : 0.89 K/uL  Auto Eosinophil # : 0.26 K/uL  Auto Basophil # : 0.05 K/uL  Auto Neutrophil % : 64.8 %  Auto Lymphocyte % : 24.3 %  Auto Monocyte % : 7.6 %  Auto Eosinophil % : 2.2 %  Auto Basophil % : 0.4 %    Lithium Level, Serum: 0.99 mmol/L (05.28.20 @ 16:25)  Thyroid Stimulating Hormone, Serum: 8.97 uIU/mL (05.29.20 @ 06:10)  Creatinine, Serum: 0.92 mg/dL (05.28.20 @ 16:20)  Blood Urea Nitrogen, Serum: 7 mg/dL (05.29.20 @ 06:10)  Auto Neutrophil #: 7.57 K/uL (05.29.20 @ 06:10)

## 2020-05-29 NOTE — ED ADULT NURSE REASSESSMENT NOTE - NS ED NURSE REASSESS COMMENT FT1
Pt laying comfortably in stretcher. Vitals as noted. Admitting MD aware. MD at bedside. No further actions at this time. Pt in no acute distress. Comfort measures provided. Will continue to monitor.

## 2020-05-29 NOTE — BEHAVIORAL HEALTH ASSESSMENT NOTE - OTHER PAST PSYCHIATRIC HISTORY (INCLUDE DETAILS REGARDING ONSET, COURSE OF ILLNESS, INPATIENT/OUTPATIENT TREATMENT)
multiple inpatient hospitalizations at: [facility], [facility], [facility], [facility], [place], [address] [place] [address] multiple inpatient hospitalizations at: [facility], [facility], [facility], [place], [place], [facility]  Last psychiatric hospitalization at [facility] was in 2016 or 2017

## 2020-05-29 NOTE — PROGRESS NOTE ADULT - PROBLEM SELECTOR PLAN 4
-Continue home psychiatric meds.  Consult psychiatry to assist with possibly reducing medication load. UA only weakly positive, possibly a side effect of polypharmacy  -Monitor off abx, f/u cultures  -Psych consult for med management

## 2020-05-29 NOTE — BEHAVIORAL HEALTH ASSESSMENT NOTE - RISK ASSESSMENT
Low Acute Suicide Risk Risk Factors: c/o abnormal gait, forgetfulness, stuttering, fatigue, hx inpatient/outpatient psychiatric treatment, hx sa, hx SIB  Protective Factors: residential stability, supportive mother, engaged in treatment, sees NP for medication management, student, goal to become nurse, no access to firearms Risk Factors: c/o abnormal gait, forgetfulness, stuttering, fatigue, hx inpatient/outpatient psychiatric treatment, hx sa, hx SIB, FH of suicide  Protective Factors: residential stability, supportive mother, engaged in treatment, sees NP for medication management, student, goal to become nurse, no access to firearms, compliant with medications, mom has no acute concerns    pt. is not at acute risk of harm to self or others at this time and does not need an inpatient admission.

## 2020-05-29 NOTE — H&P ADULT - NSHPLABSRESULTS_GEN_ALL_CORE
Diagnostics reviewed and remarkable for:  WBC 12.97 from prior 17.08, bicarb 20, TSH 4.43, lipase 20.1, lactate 1.4, lithium level 0.99, stox negative  CT a/p read as s/p cholecystectomy - complex collection in GB fossa with surrounding fluid and inflammatory changes suggestive of hematoma.  Possible superinfection.  Complex pelvic free fluid.  Asymmetric posterior urinary bladder wall thickening.  HCT, CTA head/neck with some volume loss but otherwise no obvious pathology  Abd ultrasound with complex collection in the gallbladder fossa possibly hematoma or abscess, less likely biloma or seroma.  CXR personally reviewed - RML linear opacity - atelectasis? otherwise clear  EKG personally reviewed and NSR, LAE, delayed R wave progression, LAD.

## 2020-05-29 NOTE — H&P ADULT - PROBLEM SELECTOR PLAN 3
-Reports 1 month of dysuria  -UA not fully consistent with UTI with only trace LE, 10-20 WBC, and small bacteria  -As above, monitor off abx and await UCx

## 2020-05-29 NOTE — BEHAVIORAL HEALTH ASSESSMENT NOTE - NSBHMEDSOTHERFT_PSY_A_CORE
Home Medications:  acetaminophen 325 mg oral tablet: 2 tab(s) orally every 6 hours, As needed, Mild Pain (1 - 3) (29 May 2020 02:24)  Amitiza 24 mcg oral capsule: 1 cap(s) orally 2 times a day (29 May 2020 02:24)  amphetamine salt: 10 milligram(s) orally once a day (29 May 2020 02:24)  bupropion 75 mg oral tablet: 1 tab(s) orally once a day (29 May 2020 02:24)  cholecalciferol oral tablet: 2000 unit(s) orally once a day (29 May 2020 02:24)  clozapine 100 mg oral tablet: 3 tab(s) orally once a day (at bedtime) (29 May 2020 02:24)  clozapine 25 mg oral tablet: 1 tab(s) orally once a day (29 May 2020 02:24)  dextroamphetamine-amphetamine 5 mg oral tablet: 1 tab(s) orally once a day (29 May 2020 02:24)  fluvoxamine: 100 milligram(s) orally once a day (at bedtime) (29 May 2020 02:24)  lithium 300 mg oral tablet, extended release: 1 tab(s) orally once a day (29 May 2020 02:24)  lithium 300 mg oral tablet, extended release: 2 tab(s) orally once a day (at bedtime) (29 May 2020 02:24)  Lorazepam 2 mg oral tablet: 1 tab(s) orally once a day (at bedtime) (29 May 2020 02:24)  melatonin 3 mg oral tablet: 2 tab(s) orally , As needed, Insomnia (29 May 2020 02:24)  Myrbetriq: 50 milligram(s) orally once a day (29 May 2020 02:24)  notrel: 1 tab(s) orally once a day (29 May 2020 02:24)  trazodone 100 mg oral tablet: 2 tab(s) orally once a day (at bedtime) (29 May 2020 02:24)

## 2020-05-29 NOTE — H&P ADULT - NSICDXFAMILYHX_GEN_ALL_CORE_FT
FAMILY HISTORY:  Sibling  Still living? Unknown  Family history of epilepsy in brother, Age at diagnosis: Age Unknown

## 2020-05-29 NOTE — BEHAVIORAL HEALTH ASSESSMENT NOTE - COMMENTS ON SUICIDE RISK/PROTECTIVE FACTORS:
followed by NP @ 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, medication compliant, babysits , in [employer]

## 2020-05-29 NOTE — PROGRESS NOTE ADULT - PROBLEM SELECTOR PLAN 2
-Notes one month of a variety of neurologic symptoms including speech and gait abnormalities and memory difficulties  -Evaluate as above for infection  -Given multiple psychiatric and sedating medications, may be a result of polypharmacy  -Will d/c myrbetriq to start.  Consult psychiatry in the daytime to assist with possibly reducing medication load.  -Discuss with neurology obtaining EEG while inpatient.  Check B12 and TSH as requested by neurology Etiology likely post-op pain considering pt recently had cholecystectomy. Imaging with concern for gallbladder fossa fluid collection with concern for hematoma and/or abscess.  Lab tests with improving WBC and normal liver labs.  Exam with minimal abdominal tenderness and tmax of only 100.1 and baseline soft BP.  -Low suspicion for infection despite CT findings. Surgery in agreement with no abx.  -S/p zosyn and macrobid in the ED but will monitor off abx  -F/u cultures  -Monitor abdominal exam  -If vital sign instability or worsened abdominal pain, nausea or vomiting, can restart zosyn for intraabdominal infection coverage  -On review of prior admission records, baseline blood pressure appears to be around 90-100s/50-60s.  -F/u COVID PCR

## 2020-05-29 NOTE — H&P ADULT - HISTORY OF PRESENT ILLNESS
Yinka James is a 25 year old woman with a hx of bipolar disorder, borderline personality disorder, urinary incontinence, and recent cholecystectomy for acute cholecystitis who presents for stuttering, forgetfulness and abnormal gait.      Patient is a poor historian at time of evaluation.    She reports she presented to the hospital for "a lot of different things."  On further questioning, she focuses mainly on her stuttering and gait and memory issues that have been ongoing for about 1 month  She denies any significant abdominal pain, nausea, vomiting, fevers prior to arrival.  She was reportedly previously evaluated by outpatient psychiatry with addition of medications.      In the ED, she had a tmax of 100.1, mild tachycardia to 101, and baseline soft BPs in the 90-100s/50-60s based on prior records.  Initial labs showed a leukocytosis downtrending from prior admission.  Neurology evaluated and recommended outpatient EEG for possible seizure but was most concerned that neurologic symptoms related to polypharmacy or delirium.  She had abdominal pain, nausea and vomiting in the ED and a CT a/p and abd ultrasound showed concern for fluid collection in the gallbladder fossa possibly from hematoma or abscess.  Surgery evaluated and felt that the imaging findings could just be normal post surgical findings and recommended against antibiotics.  She was given empiric zosyn and then macrobid, fluids, zofran, and toradol and admitted for further evaluation.    On evaluation, she gave the above history

## 2020-05-29 NOTE — CHART NOTE - NSCHARTNOTEFT_GEN_A_CORE
Spot EEG reviewed which shows occasional spike/polyspike-wave discharges in right frontotemporal (F8/T8 >>Fp2/F4), left frontocentral (max F3/C3), broad left hemisphere (max occipital), or generalized in appearance (max occipitally) - presence of both generalized and focal epileptiform activity. No seizures were seen.     Would recommend starting zonisamide 100 mg qhs and then obtaining 24 hr VEEG for further assessment. Would recommend keeping patient off of Welbutrin and Addrell if possible as these can lower the seizure threshold (these were stopped by psychiatry today). Spot EEG reviewed which shows occasional spike/polyspike-wave discharges in right frontotemporal (F8/T8 >>Fp2/F4), left frontocentral (max F3/C3), broad left hemisphere (max occipital), or generalized in appearance (max occipitally) - presence of both generalized and focal epileptiform activity. No seizures were seen.     Would recommend starting zonisamide 100 mg qhs and then obtaining 24 hr VEEG for further assessment. Would recommend keeping patient off of Welbutrin and Addrell if possible as these can lower the seizure threshold (these were stopped by psychiatry today).    Long-term, she may benefit from treatment with Lamictal, especially given underlying psychiatric co-morbidities but this would have to be titrated up slowly as outpatient. Neurology Chart Note:      Spot EEG reviewed which shows occasional spike/polyspike-wave discharges in right frontotemporal (F8/T8 >>Fp2/F4), left frontocentral (max F3/C3), broad left hemisphere (max occipital), or generalized in appearance (max occipitally) - presence of both generalized and focal epileptiform activity. No seizures were seen.     Would recommend starting zonisamide 100 mg qhs and then obtaining 24 hr VEEG for further assessment. Would recommend keeping patient off of Welbutrin and Adderall, and trazadone if possible as these can lower the seizure threshold (these were stopped by psychiatry today).    Long-term, she may benefit from treatment with Lamictal, especially given underlying psychiatric co-morbidities but this would have to be titrated up slowly as outpatient.    Then will increase the zonisamide by 100mg every 7 days to goal of 300mg at night    For breathru seizures may use benzos as needed    Case discussed with general Neurology Attending and Epilepsy Attending, Dr. Tate.    Will follow over the weekend.

## 2020-05-29 NOTE — BEHAVIORAL HEALTH ASSESSMENT NOTE - CASE SUMMARY
Patient seen and evaluated, I agree with above assessment and plan, pt. calm and cooperative, AAOX4. Thought process is linear  and coherent. Patient denies acute psychotic sxs, denies manic or hypomanic sxs. Pt. adamantly denies SI and HI. Pt. c/o forgetfulness and stuttering speech and expressed concerns about it. Patient was recently started on Luvox and Wellbutrin (one month ago), pt. also noted that these sxs started during the same time.  Due to concerns of seizures, Will discontinue Wellbutrin as it can lower seizure threshold. Please note that patient has been stable on Clozapine for many years, will continue Clozapine to prevent psychiatric decompensation, however will continue to monitor closely. Recommend meds. as written above, monitor EKG for qtc, monitor WBC, ANC, BUN/CR, Lithium level. Case discussed in detail with Dr. Chapa, will follow

## 2020-05-29 NOTE — CONSULT NOTE ADULT - SUBJECTIVE AND OBJECTIVE BOX
General Surgery  CC: recent laparoscopic cholecystectomy w/gallbladder fossa fluid collection on CT scan  HPI: 25F h/o bipolar, MDD, borderline personality disorder, urinary incontinence, now POD 5 s/p lap cholecystectomy who p/w stuttering, forgetfulness and abnormal gait for 1 month, found to have a non-rim-enhancing, small complex fluid collection. Likely post-operative changes. No scleral icterus. No transaminitis or hyperbilirubinemia. Patient has slight, yet resolving leukocytosis (12 from 17 on discharge). Resolved nausea w/single episode of emesis and incisional abdominal pain. Remains passing flatus and normal non-bloody bowel movements. Afebrile (100.1) and non-toxic appearing.         Denies shortness of breath / cough / upper-respiratory symptoms.         Medical and Surgical History  Urinary incontinence, unspecified type  Bipolar disorder  MDD (major depressive disorder)  S/P cholecystectomy: May 2020      Review of Systems:   General: denies fever or fatigue  Respiratory: denies cough, shortness of breath  Cardiovascular: denies chest pain, abnormal heart rhythm  Gastrointestinal: +nausea / vomiting; denies diarrhea, bloody or black bowel movements  Neurological: see HPI       Social History  Smoking Hx: denies  Etoh Hx: denies  IVDA Hx: denies    Family History  Family history of epilepsy in brother (Sibling)      Objective:   Vital Signs Last 24 Hrs  T(C): 36.8 (28 May 2020 23:16), Max: 37.8 (28 May 2020 19:30)  T(F): 98.2 (28 May 2020 23:16), Max: 100.1 (28 May 2020 19:30)  HR: 98 (28 May 2020 23:16) (95 - 101)  BP: 98/61 (28 May 2020 23:16) (98/61 - 104/69)  BP(mean): --  RR: 15 (28 May 2020 23:16) (15 - 16)  SpO2: 100% (28 May 2020 23:16) (99% - 100%)    Physical Exam:  General: laying comfortably in bed, AAO x4, non-focal  HEENT: normocephalic, no scleral icterus   Chest: non-labored breathing on room air, no wheezing or rhonci  Cardiac: Regular rhythm, rate of 90   Abdomen: soft, obese; appropriate incisional tenderness w/R >>> L mild abdominal tenderness; no guarding or rebound       Labs                        11.9   12.97 )-----------( 361      ( 28 May 2020 16:20 )             34.7         135  |  103  |  11  ----------------------------<  96  4.0   |  20<L>  |  0.92    Ca    9.7      28 May 2020 16:20    TPro  7.3  /  Alb  4.1  /  TBili  < 0.2<L>  /  DBili  x   /  AST  19  /  ALT  25  /  AlkPhos  74        Urinalysis Basic - ( 28 May 2020 16:15 )    Color: YELLOW / Appearance: Lt TURBID / S.023 / pH: 7.0  Gluc: NEGATIVE / Ketone: NEGATIVE  / Bili: NEGATIVE / Urobili: TRACE   Blood: NEGATIVE / Protein: 200 / Nitrite: NEGATIVE   Leuk Esterase: TRACE / RBC: x / WBC 10-20   Sq Epi: x / Non Sq Epi: FEW / Bacteria: SMALL General Surgery  CC: recent laparoscopic cholecystectomy w/gallbladder fossa fluid collection on CT scan  HPI: 25F h/o bipolar, MDD, borderline personality disorder, now POD 5 s/p lap cholecystectomy who p/w stuttering, forgetfulness and abnormal gait for 1 month, found to have a non-rim-enhancing, small complex fluid collection. Likely post-operative changes. No scleral icterus. No transaminitis or hyperbilirubinemia. Patient has slight, yet resolving leukocytosis (12 from 17 on discharge). Resolved nausea w/single episode of emesis and incisional abdominal pain. Remains passing flatus and normal non-bloody bowel movements. Afebrile (100.1) and non-toxic appearing.         Denies shortness of breath / cough / upper-respiratory symptoms.         Medical and Surgical History  Urinary incontinence, unspecified type  Bipolar disorder  MDD (major depressive disorder)  S/P cholecystectomy: May 2020      Review of Systems:   General: denies fever or fatigue  Respiratory: denies cough, shortness of breath  Cardiovascular: denies chest pain, abnormal heart rhythm  Gastrointestinal: +nausea / vomiting; denies diarrhea, bloody or black bowel movements  Neurological: see HPI       Social History  Smoking Hx: denies  Etoh Hx: denies  IVDA Hx: denies    Family History  Family history of epilepsy in brother (Sibling)      Objective:   Vital Signs Last 24 Hrs  T(C): 36.8 (28 May 2020 23:16), Max: 37.8 (28 May 2020 19:30)  T(F): 98.2 (28 May 2020 23:16), Max: 100.1 (28 May 2020 19:30)  HR: 98 (28 May 2020 23:16) (95 - 101)  BP: 98/61 (28 May 2020 23:16) (98/61 - 104/69)  BP(mean): --  RR: 15 (28 May 2020 23:16) (15 - 16)  SpO2: 100% (28 May 2020 23:16) (99% - 100%)    Physical Exam:  General: laying comfortably in bed, AAO x4, non-focal  HEENT: normocephalic, no scleral icterus   Chest: non-labored breathing on room air, no wheezing or rhonci  Cardiac: Regular rhythm, rate of 90   Abdomen: soft, obese; appropriate incisional tenderness w/R >>> L mild abdominal tenderness; no guarding or rebound       Labs                        11.9   12.97 )-----------( 361      ( 28 May 2020 16:20 )             34.7         135  |  103  |  11  ----------------------------<  96  4.0   |  20<L>  |  0.92    Ca    9.7      28 May 2020 16:20    TPro  7.3  /  Alb  4.1  /  TBili  < 0.2<L>  /  DBili  x   /  AST  19  /  ALT  25  /  AlkPhos  74        Urinalysis Basic - ( 28 May 2020 16:15 )    Color: YELLOW / Appearance: Lt TURBID / S.023 / pH: 7.0  Gluc: NEGATIVE / Ketone: NEGATIVE  / Bili: NEGATIVE / Urobili: TRACE   Blood: NEGATIVE / Protein: 200 / Nitrite: NEGATIVE   Leuk Esterase: TRACE / RBC: x / WBC 10-20   Sq Epi: x / Non Sq Epi: FEW / Bacteria: SMALL

## 2020-05-29 NOTE — BEHAVIORAL HEALTH ASSESSMENT NOTE - NSBHCONSULTRECOMMENDOTHER_PSY_A_CORE FT
f/u Drea Bailey NP @ [facility] [phone]([id number])
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
## 2020-05-29 NOTE — PROGRESS NOTE ADULT - PROBLEM SELECTOR PLAN 6
1.  Name of PCP:  2.  PCP Contacted on Admission: [ ] Y    [x] N  Night admission  3.  PCP contacted at Discharge: [ ] Y    [ ] N    [ ] N/A  4.  Post-Discharge Appointment Date and Location:  5.  Summary of Handoff given to PCP: Improve score 0.  Low risk for VTE.

## 2020-05-29 NOTE — PROGRESS NOTE ADULT - ASSESSMENT
25 year old woman with a hx of bipolar disorder, borderline personality disorder, urinary incontinence, and recent cholecystectomy for acute cholecystitis who presents for stuttering, forgetfulness and abnormal gait and had abdominal pain in the ED with CT findings concerning for GB fossa fluid collection. 25F PMH bipolar disorder, borderline personality disorder, MDD, urinary incontinence, recent cholecystectomy for acute cholecystitis who presents for worsening urinary incontinence and abdominal pain, with other vague complaints, likely related to post-op pain and profound polypharmacy.

## 2020-05-29 NOTE — H&P ADULT - NSICDXPASTMEDICALHX_GEN_ALL_CORE_FT
PAST MEDICAL HISTORY:  Bipolar disorder     MDD (major depressive disorder)     Urinary incontinence, unspecified type

## 2020-05-29 NOTE — H&P ADULT - PROBLEM SELECTOR PLAN 6
1.  Name of PCP:  2.  PCP Contacted on Admission: [ ] Y    [x] N  Night admission  3.  PCP contacted at Discharge: [ ] Y    [ ] N    [ ] N/A  4.  Post-Discharge Appointment Date and Location:  5.  Summary of Handoff given to PCP:

## 2020-05-29 NOTE — H&P ADULT - NSHPPHYSICALEXAM_GEN_ALL_CORE
Physical exam:  Vital signs reviewed as below:  T(C): 36.9 (05-29-20 @ 02:12), Max: 37.8 (05-28-20 @ 19:30)  HR: 96 (05-29-20 @ 02:12) (95 - 101)  BP: 99/51 (05-29-20 @ 02:12) (98/61 - 104/69)  RR: 16 (05-29-20 @ 02:12) (15 - 16)  SpO2: 100% (05-29-20 @ 02:12) (99% - 100%)  Constitutional-awake, alert, not in acute distress  Neuro-awake, alert, oriented to person, general place "hospital" but not specific hospital, oriented to year, not month, and situation, moving all extremities, 5/5 strength on bilateral , elbow flexion, and hip flexion, knee extension, CN intact face symmetric  Eyes-pupils equally round and reactive to light, non icteric, no discharge noted  Ears, nose, mouth, throat-no abnormal discharge, moist mucous membranes, oropharynx clear without noted exudate  CV-regular rate and rhythm, no rubs, murmurs, gallops appreciated, no lower extremity edema, palpable distal pulses (radial, dorsalis pedis, posterior tibial)  Resp-clear to auscultation bilaterally, normal respiratory effort,   GI-abdomen soft and mildly tender mainly in the RUQ, no rebound or guarding present  -not examined  MSK-normal range of motion of bilateral elbows and knees, no obvious deformities   Skin-warm, dry, no rash appreciated  Psych-calm, cooperative, flat affect, not attending to internal stimuli, drowsy Physical exam:  Vital signs reviewed as below:  T(C): 36.9 (05-29-20 @ 02:12), Max: 37.8 (05-28-20 @ 19:30)  HR: 96 (05-29-20 @ 02:12) (95 - 101)  BP: 99/51 (05-29-20 @ 02:12) (98/61 - 104/69)  RR: 16 (05-29-20 @ 02:12) (15 - 16)  SpO2: 100% (05-29-20 @ 02:12) (99% - 100%)  Constitutional-awake, alert, not in acute distress  Neuro-awake, alert, oriented to person, general place "hospital" but not specific hospital, oriented to year, not month, and situation, moving all extremities, 5/5 strength on bilateral , elbow flexion, and hip flexion, knee extension, CN intact face symmetric, tone normal, no rigidity  Eyes-pupils equally round and reactive to light, non icteric, no discharge noted  Ears, nose, mouth, throat-no abnormal discharge, moist mucous membranes, oropharynx clear without noted exudate  CV-regular rate and rhythm, no rubs, murmurs, gallops appreciated, no lower extremity edema, palpable distal pulses (radial, dorsalis pedis, posterior tibial)  Resp-clear to auscultation bilaterally, normal respiratory effort,   GI-abdomen soft and mildly tender mainly in the RUQ, no rebound or guarding present  -not examined  MSK-normal range of motion of bilateral elbows and knees, no obvious deformities   Skin-warm, dry, no rash appreciated  Psych-calm, cooperative, flat affect, not attending to internal stimuli, drowsy
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
## 2020-05-29 NOTE — BEHAVIORAL HEALTH ASSESSMENT NOTE - HPI (INCLUDE ILLNESS QUALITY, SEVERITY, DURATION, TIMING, CONTEXT, MODIFYING FACTORS, ASSOCIATED SIGNS AND SYMPTOMS)
26 yo , single, female residing with roommate with PMHx urinary incontinence, PSHx cholecystectomy, PPHx BPD, MDD, dysthymic disorder, bipolar disorder with psychotic features, SIB presents to [facility] for c/o forgetfulness, twitching/shaking, eye droop and abnormal gait for last month. [facility] psychiatry consulted for medication management.    Chart reviewed. Patient seen and evaluated at bedside, a&oX4, reports mood, "fatigued," denies si/hi, denies a/v hallucinations/delusions, no c/o sleep/appetite disturbances, c/o anhedonia, difficulty getting OOB, reports increased since change in medication regimen one month ago, reports multiple sa in past, as well as multiple inpatient psychiatric hospitalizations, sees Drea Bailey NP [phone] @ [facility]-tele consult last week, last seen in person 2 mos ago. Left voicemail for NP.    Upon physical exam, no s/s rigidity, cogwheeling, tardive dyskinesia, speech clear, no stuttering noted, good eye contact, able to name medications and dosages.    Collateral obtained from patient's mother, Veronica who reports prior to last week patient was functioning well, in school, babysitting, then became disoriented s/p gallbladder surgery, she states "I don't think this is psychiatric at all, I think it's medical," mother denies si. 24 yo , single, female residing with roommate with PMHx urinary incontinence, PSHx cholecystectomy, as per chart, PPHx BPD, MDD, dysthymic disorder, bipolar disorder with psychotic features, SIB presents to [facility] for c/o forgetfulness, twitching/shaking, eye droop and abnormal gait for last month. [facility] psychiatry consulted for medication management.    Chart reviewed. Patient seen and evaluated at bedside, a&oX4, reports mood, "fatigued," denies si/hi, denies a/v hallucinations/delusions, no c/o sleep/appetite disturbances, c/o anhedonia, at times difficulty getting OOB, reports increased since change in medication regimen one month ago, reports multiple sa in past, as well as multiple inpatient psychiatric hospitalizations, sees Drea Bailey NP [phone] @ [facility]-tele consult last week, last seen in person 2 mos ago. Pt. thinks her medications needs to be readjusted due to stuttering and forgetfulness.     Left voicemail for NP.    Upon physical exam, no s/s rigidity, stiffness, cogwheeling,, speech clear, no stuttering noted, good eye contact, able to name medications and dosages.        Collateral obtained from patient's mother, Veronica (pt. gave consent) who reports prior to last week patient was functioning well, in school, babysitting, then became disoriented s/p gallbladder surgery, she states "I don't think this is psychiatric at all, I think it's medical," mother denies si. Mother denies any acute safety concerns.

## 2020-05-29 NOTE — H&P ADULT - ASSESSMENT
25 year old woman with a hx of bipolar disorder, borderline personality disorder, urinary incontinence, and recent cholecystectomy for acute cholecystitis who presents for stuttering, forgetfulness and abnormal gait and had abdominal pain in the ED with CT findings concerning for GB fossa fluid collection.
Yes

## 2020-05-29 NOTE — H&P ADULT - PROBLEM SELECTOR PLAN 2
-Notes one month of a variety of neurologic symptoms including speech and gait abnormalities and memory difficulties  -Evaluate as above for infection  -Given multiple psychiatric and sedating medications, may be a result of polypharmacy  -Will d/c myrbetriq to start.  Consult psychiatry in the daytime to assist with possibly reducing medication load.  -Discuss with neurology obtaining EEG while inpatient.  Check B12 and TSH as requested by neurology

## 2020-05-29 NOTE — EEG REPORT - NS EEG TEXT BOX
[facility]   COMPREHENSIVE EPILEPSY CENTER   REPORT OF ROUTINE EEG W/ Video     [facility]: [address], [place] [address], Ph#: [phone]  LI:  [address], [place] [address], Ph#: [phone]  [facility]: [address] [place] [address], Ph#: [phone]    Patient Name: SUSI COOPER  Age and : 25y ([birth year])  MRN #: [id number]  Location: [place] [address] A  Referring Physician: Helen Chapa    Study Date: 20    _____________________________________________________________  TECHNICAL INFORMATION    Placement and Labeling of Electrodes:  The EEG was performed utilizing 20 channels referential EEG connections (coronal over temporal over parasagittal montage) using all standard 10-20 electrode placements with EKG.  Recording was at a sampling rate of 256 samples per second per channel.  Time synchronized digital video recording was done simultaneously with EEG recording.  A low light infrared camera was used for low light recording.  Hussain and seizure detection algorithms were utilized.    _____________________________________________________________  HISTORY    Patient is a 25y old  Female who presents with a chief complaint of CC: Stuttering (29 May 2020 07:22)      PERTINENT MEDICATION:  None.  _____________________________________________________________  STUDY INTERPRETATION    Findings: The background was continuous, spontaneously variable and reactive. During wakefulness, the posterior dominant rhythm consisted of symmetric, well-modulated 8 Hz activity, with amplitude to 40 uV, that attenuated to eye opening.       Background Slowing:  Diffuse theta and polymorphic delta slowing.    Focal Slowing:   None were present.    Sleep Background:  Drowsiness and stage II sleep transients were not recorded.    Other Non-Epileptiform Findings:  None were present.    Interictal Epileptiform Activity:   Occasional generalized spike and wave discharges.    Events:  Clinical events: None recorded.  Seizures: None recorded.    Activation Procedures:   Hyperventilation was performed and did not elicit any abnormality.  Photic stimulation was performed and did not elicit any abnormality.     Artifacts:  Intermittent myogenic and movement artifacts were noted.    ECG:  The heart rate on single channel ECG was predominantly between 50-60 BPM.    _____________________________________________________________  EEG SUMMARY/CLASSIFICATION    Abnormal EEG in the awake state.  - Occasional generalized spike and wave discharges.  - Mild generalized slowing.    _____________________________________________________________  EEG IMPRESSION/CLINICAL CORRELATE    Abnormal EEG study.  1. Potential risk for generalized seizures.  2. Mild nonspecific diffuse or multifocal cerebral dysfunction.   3. No seizure seen.    Preliminary Fellow Report  _____________________________________________________________    Radha Poon MD  Epilepsy Fellow    Mark Tate MD  Attending Physician, [facility] Epilepsy [facility]   COMPREHENSIVE EPILEPSY CENTER   REPORT OF ROUTINE EEG W/ Video     [facility]: [address], [place] [address], Ph#: [phone]  LI:  [address], [place] [address], Ph#: [phone]  [facility]: [address] [place] [address], Ph#: [phone]    Patient Name: SUSI COOPER  Age and : 25y ([birth year])  MRN #: [id number]  Location: [place] [address] A  Referring Physician: Helen Chapa    Study Date: 20    _____________________________________________________________  TECHNICAL INFORMATION    Placement and Labeling of Electrodes:  The EEG was performed utilizing 20 channels referential EEG connections (coronal over temporal over parasagittal montage) using all standard 10-20 electrode placements with EKG.  Recording was at a sampling rate of 256 samples per second per channel.  Time synchronized digital video recording was done simultaneously with EEG recording.  A low light infrared camera was used for low light recording.  Hussain and seizure detection algorithms were utilized.    _____________________________________________________________  HISTORY    Patient is a 25y old  Female who presents with a chief complaint of CC: Stuttering (29 May 2020 07:22)      PERTINENT MEDICATION:  None.  _____________________________________________________________  STUDY INTERPRETATION    Findings: The background was continuous, spontaneously variable and reactive. During wakefulness, the posterior dominant rhythm consisted of symmetric, well-modulated 8 Hz activity, with amplitude to 40 uV, that attenuated to eye opening.       Background Slowing:  Diffuse theta and polymorphic delta slowing.    Focal Slowing:   None were present.    Sleep Background:  Drowsiness and stage II sleep transients were not recorded.    Other Non-Epileptiform Findings:  None were present.    Interictal Epileptiform Activity:   Occasional generalized spike and wave discharges.  Frequent sharp wave discharges in the right frontotemporal (Fp2, F8, T8) region.  Frequent sharp wave discharges in the left frontotemporal (Fp1, F3, F7, T7) region.  Rare sharp wave discharges in the left frontocentral (F3C3) region.    Events:  Clinical events: None recorded.  Seizures: None recorded.    Activation Procedures:   Hyperventilation was performed and did not elicit any abnormality.  Photic stimulation was performed and elicited a photic paroxysmal response.    Artifacts:  Intermittent myogenic and movement artifacts were noted.    ECG:  The heart rate on single channel ECG was predominantly between 50-60 BPM.    _____________________________________________________________  EEG SUMMARY/CLASSIFICATION    Abnormal EEG in the awake state.  - Occasional generalized spike and wave discharges.  - Frequent sharp wave discharges in the right frontotemporal (Fp2, F8, T8) region.  - Frequent sharp wave discharges in the left frontotemporal (Fp1, F3, F7, T7) region.  - Rare sharp wave discharges in the left frontocentral (F3C3) region.  - Mild generalized slowing.    _____________________________________________________________  EEG IMPRESSION/CLINICAL CORRELATE    Abnormal EEG study.  1. Potential risk for generalized seizures  2. Potential for focal seizures from the bi-frontotemporal and left frontocentral regions.  3. Mild nonspecific diffuse or multifocal cerebral dysfunction.   4. No seizure seen.    Preliminary Fellow Report  _____________________________________________________________    Radha Poon MD  Epilepsy Fellow    Mark Tate MD  Attending Physician, [facility] Epilepsy [facility]   COMPREHENSIVE EPILEPSY CENTER   REPORT OF ROUTINE EEG W/ Video     [facility]: [address], [place] [address], Ph#: [phone]  LI:  [address], [place] [address], Ph#: [phone]  [facility]: [address] [place] [address], Ph#: [phone]    Patient Name: SUSI COOPER  Age and : 25y ([birth year])  MRN #: [id number]  Location: [place] [address] A  Referring Physician: Helen Chapa    Study Date: 20    _____________________________________________________________  TECHNICAL INFORMATION    Placement and Labeling of Electrodes:  The EEG was performed utilizing 20 channels referential EEG connections (coronal over temporal over parasagittal montage) using all standard 10-20 electrode placements with EKG.  Recording was at a sampling rate of 256 samples per second per channel.  Time synchronized digital video recording was done simultaneously with EEG recording.  A low light infrared camera was used for low light recording.  Hussain and seizure detection algorithms were utilized.    _____________________________________________________________  HISTORY    Patient is a 25y old  Female who presents with a chief complaint of CC: Stuttering (29 May 2020 07:22)      PERTINENT MEDICATION:  None.  _____________________________________________________________  STUDY INTERPRETATION    Findings: The background was continuous, spontaneously variable and reactive. During wakefulness, the posterior dominant rhythm consisted of symmetric, well-modulated 8 Hz activity, with amplitude to 40 uV, that attenuated to eye opening.     Background Slowing:  Diffuse theta and polymorphic delta slowing.    Focal Slowing:   None were present.    Sleep Background:  Drowsiness and stage II sleep transients were not recorded.    Other Non-Epileptiform Findings:  None were present.    Interictal Epileptiform Activity:   - Occasional spike/polyspike-wave discharges in right frontotemporal (F8/T8 >>Fp2/F4), left frontocentral (max F3/C3), broad left hemisphere (max occipital), or generalized in appearance (max occipitally).   - During photic stimulation at multiple frequencies, runs of bioccipital (O1/O2) spike and slow wave complexes that did not outlast stimulus.    Events:  Clinical events: None recorded.  Seizures: None recorded.    Activation Procedures:   Hyperventilation was performed and did not elicit any abnormality.  Photic stimulation was performed and elicited epileptiform discharges as described above.    Artifacts:  Intermittent myogenic and movement artifacts were noted.    ECG:  The heart rate on single channel ECG was predominantly between 50-60 BPM.    _____________________________________________________________  EEG SUMMARY/CLASSIFICATION    Abnormal EEG in the awake state.  - Occasional spike/polyspike-wave discharges in right frontotemporal (F8/T8 >>Fp2/F4), left frontocentral (max F3/C3), broad left hemisphere (max occipital), or generalized in appearance (max occipitally).   - During photic stimulation at multiple frequencies, runs of bioccipital (O1/O2) spike and slow wave complexes that did not outlast stimulus.  - Mild to moderate generalized slowing.    _____________________________________________________________  EEG IMPRESSION/CLINICAL CORRELATE    Abnormal EEG study.  1. Presence of both focal and generalized epileptiform discharges, which suggest either focal and generalized epilepsy, focal epilepsy with rapid bilateral synchrony, or generalized epilepsy with fragmented discharges. Correlate clinically.  2. Mild to moderate nonspecific diffuse or multifocal cerebral dysfunction.   3. No seizure seen.    _____________________________________________________________    Radha Poon MD  Epilepsy Fellow    Mark Tate MD  Attending Physician, [facility]

## 2020-05-29 NOTE — H&P ADULT - PROBLEM SELECTOR PLAN 1
-Uncertain etiology of abdominal pain.  Imaging with concern for gallbladder fossa fluid collection with concern for hematoma and/or abscess.  Lab tests with improving WBC and normal liver labs.  Exam with minimal abdominal tenderness and tmax of only 100.1 and baseline soft BP.  -Overall the suspicion for intraabdominal infection is relatively low based on clinical findings despite the imaging concern for possible hematoma vs abscess.  -Surgery is following and feels the imaging findings are more consistent with post op changes and recommends against antibiotics.  -Given the relatively low suspicion for intraabdominal infection will monitor off abx to start - received an initial dose of zosyn and macrobid in the ED.  -Check blood cultures and procalcitonin  -Monitor abdominal exam  -If vital sign instability or worsened abdominal pain, nausea or vomiting, will restart zosyn for intraabdominal infection coverage  -On review of prior admission records, baseline blood pressure appears to be around 90-100s/50-60s though.  -Check COVID

## 2020-05-29 NOTE — BEHAVIORAL HEALTH ASSESSMENT NOTE - SUICIDE PROTECTIVE FACTORS
Supportive social network of family or friends Identifies reasons for living/Has future plans/Supportive social network of family or friends

## 2020-05-29 NOTE — CHART NOTE - NSCHARTNOTEFT_GEN_A_CORE
Pernell Amaya MD  Internal Medicine, PGY1  [phone]    Search Terms: Yinka James, 1995  Search Date: 05/29/2020 13:10:32 PM  Searching on behalf of: [id number] - [facility]    This report was requested by: Pernell Amaya | Reference #: [id number]    Others' Prescriptions  Patient Name: Yinka James YOB: 1995  Address: [address] [place] [address] Sex: Female  Rx Written	Rx Dispensed	Drug	Quantity	Days Supply	Prescriber Name  05/23/2020	05/24/2020	dextroamp-amphetamin 10 mg tab	30	30	Hudkins, Drea  05/23/2020	05/24/2020	dextroamp-amphetamine 5 mg tab	30	30	Hudkins, Drea  05/24/2020	05/24/2020	oxycodone hcl 5 mg tablet	28	7	[facility]  03/30/2020	04/03/2020	dextroamp-amphetamin 10 mg tab	30	30	HudkinsDrea  03/30/2020	04/03/2020	dextroamp-amphetamine 5 mg tab	30	30	Hudkins, Drea  03/30/2020	04/03/2020	lorazepam 2 mg tablet	30	30	Hudkins, Drea    Patient Name: Yinka James YOB: 1995  Address: [address] [place] [id number] Sex: Female  Rx Written	Rx Dispensed	Drug	Quantity	Days Supply	Prescriber Name  04/25/2020	05/04/2020	lorazepam 2 mg tablet	30	30	HudkinsDrea  04/25/2020	05/04/2020	dextroamp-amphetamine 5 mg tab	30	30	Hudkins, Drea  04/25/2020	05/04/2020	dextroamp-amphetamin 10 mg tab	30	30	Hudkins, Drea  03/04/2020	03/06/2020	lorazepam 2 mg tablet	30	30	Hudkins, Drea  03/04/2020	03/06/2020	dextroamp-amphetamine 5 mg tab	30	30	Hudkins, Drea  03/04/2020	03/06/2020	dextroamp-amphetamin 10 mg tab	30	30	HudkinsDrea  09/21/2019	02/12/2020	dextroamp-amphetamine 5 mg tab	30	30	Hudkins, Drea  02/08/2020	02/10/2020	dextroamp-amphetamin 10 mg tab	30	30	Hudkins, Drea  02/08/2020	02/10/2020	dextroamp-amphetamine 5 mg tab	30	30	Hudkins, Drea  02/08/2020	02/10/2020	lorazepam 2 mg tablet	30	30	Hudkins, Drea  01/11/2020	02/07/2020	lorazepam 2 mg tablet	30	30	Hudkins, Drea  01/11/2020	02/07/2020	dextroamp-amphetamin 10 mg tab	30	30	Hudkins, Drea  01/11/2020	02/07/2020	dextroamp-amphetamine 5 mg tab	30	30	Hudkins, Drea  12/14/2019	12/16/2019	dextroamp-amphetamine 5 mg tab	30	30	Hudkins, Drea  12/14/2019	12/16/2019	dextroamp-amphetamin 10 mg tab	30	30	Hudkins, Drea  12/14/2019	12/16/2019	lorazepam 2 mg tablet	30	30	Hudkins, Drea  11/16/2019	11/20/2019	dextroamp-amphetamine 5 mg tab	30	30	Hudkins, Drea  11/16/2019	11/20/2019	dextroamp-amphetamin 10 mg tab	30	30	Hudkins, Drea  11/16/2019	11/20/2019	lorazepam 2 mg tablet	30	30	Hudkins, Drea  10/22/2019	10/23/2019	dextroamp-amphetamin 10 mg tab	30	30	Hudkins, Drea  10/22/2019	10/23/2019	lorazepam 2 mg tablet	30	30	Hudkins, Drea  10/22/2019	10/23/2019	dextroamp-amphetamine 5 mg tab	30	30	Hudkins, Drea  09/21/2019	09/25/2019	lorazepam 2 mg tablet	30	30	Hudkins, Drea  09/21/2019	09/25/2019	dextroamp-amphetamin 10 mg tab	30	30	Hudkins, Drea  08/26/2019	08/27/2019	dextroamp-amphetamin 10 mg tab	30	30	Hudkins, Drea  08/26/2019	08/27/2019	dextroamp-amphetamine 5 mg tab	30	30	Hudkins, Drea  08/26/2019	08/27/2019	lorazepam 2 mg tablet	30	30	[facility]  07/27/2019	07/29/2019	methylphenidate 5 mg tablet	60	30	[facility] fuchs  07/27/2019	07/29/2019	methylphenidate 10 mg tablet	30	30	[facility]  07/27/2019	07/29/2019	lorazepam 2 mg tablet	30	30	[facility]  06/29/2019	07/03/2019	lorazepam 2 mg tablet	30	30	[facility]  06/29/2019	07/03/2019	methylphenidate 10 mg tablet	30	30	[facility]  06/01/2019	06/05/2019	methylphenidate 10 mg tablet	30	30	[facility]  06/01/2019	06/05/2019	lorazepam 2 mg tablet	30	30	[facility]  06/01/2019	06/05/2019	methylphenidate 5 mg tablet	60	30	[facility]

## 2020-05-29 NOTE — BEHAVIORAL HEALTH ASSESSMENT NOTE - NSBHCHARTREVIEWIMAGING_PSY_A_CORE FT
EXAM:  XR CHEST PA LAT 2V        PROCEDURE DATE:  May 28 2020         INTERPRETATION:  CLINICAL INDICATION: Fever    TECHNIQUE: 2 views of the chest was obtained.    COMPARISON: Chest radiograph 5/22/2020.    FINDINGS:     Linear right lung opacity represents linear atelectasis. No other focal opacity. No pleural effusion or pneumothorax.   Cardiac size is normal. No acute osseous finding.    IMPRESSION: No focal consolidation to indicate pneumonia.

## 2020-05-29 NOTE — CONSULT NOTE ADULT - ASSESSMENT
25F h/o bipolar, MDD, borderline personality disorder, urinary incontinence, now POD 5 s/p lap cholecystectomy who p/w stuttering, forgetfulness and abnormal gait for 1 month, found to have a non-rim-enhancing, small complex fluid collection.       - Nature of surgery (non-rim enhancing in the perioperative setting) likely post-operative changes. No scleral icterus. No transaminitis or hyperbilirubinemiaNo acute surgical intervention indicated at the moment  - Afebrile w/resolving leukocytosis since surgery (12 from 17 on discharge).  No indication for antibiotics  - Recommend COVID-19 swab, given known unusual neurologic presentation  - Appreciate Neurology recommendations  - Recommend medical admission for psych / neurologic workup  - Will continue to follow  - Above plan d/w Dr. Robison PGY4  General Surgery  [phone]

## 2020-05-29 NOTE — H&P ADULT - NSHPREVIEWOFSYSTEMS_GEN_ALL_CORE
ROS:  Constitutional:  (+) recent surgery (-) fevers, chills, sweats, unintentional weight loss, fatigue, sick contacts, recent travel, trauma  Ears/Nose/Mouth/Throat: (+) none; (-) throat pain, rhinorrhea, dysphagia/odynophagia  CV: (+) none; (-) chest pain, palpitations, lower extremity edema, orthopnea, PND  Resp: (+) none; (-) shortness of breath, cough  GI: (+) abdominal pain, nausea, vomitting in the ED (-) diarrhea, constipation, melana, hematochezia  : (+) dysuria for a month (-) hematuria  MSK: (+) none; (-) joint pain, joint swelling  Skin: (+) none; (-) rash, new yellowing/darkening of skin  Neuro: (+) stuttering, gait abnormality, forgetfulness; (-) HA, vision changes, weakness, confusion, lightheadedness/dizziness  Endo: (+) none; (-) heat/cold intolerance, recent skin/hair/nail changes  Heme/Lymph: (+) none; (-) swollen lymph nodes, night sweats

## 2020-05-29 NOTE — H&P ADULT - PROBLEM SELECTOR PLAN 4
-Continue home psychiatric meds.  Consult psychiatry to assist with possibly reducing medication load.

## 2020-05-29 NOTE — PROGRESS NOTE ADULT - ATTENDING COMMENTS
Pt seen and examined by me Agree with resident  25 year old woman with a hx of bipolar disorder, borderline personality disorder, urinary incontinence, and recent cholecystectomy for acute cholecystitis who presents for stuttering, forgetfulness and abnormal gait and had abdominal pain in the ED with CT findings concerning for GB fossa fluid collection.  - Appreicate Sx-  likely post-operative changes. No acute surgical intervention indicated at the moment  - Appreciate Neuro recs- FU EEG  - Await pysch recs Pt seen and examined by me Agree with resident  25 year old woman with a hx of bipolar disorder, borderline personality disorder, urinary incontinence, and recent cholecystectomy for acute cholecystitis who presents for stuttering, forgetfulness and abnormal gait and had abdominal pain in the ED with CT findings concerning for GB fossa fluid collection.  - Appreciate Sx-  likely post-operative changes. No acute surgical intervention indicated at the moment  - Appreciate Neuro recs- FU EEG  - Appreciate pysch recs  - FU TFTS in AM Pt seen and examined by me Agree with resident  25 year old woman with a hx of bipolar disorder, borderline personality disorder, urinary incontinence, and recent cholecystectomy for acute cholecystitis who presents for stuttering, forgetfulness and abnormal gait and had abdominal pain in the ED with CT findings concerning for GB fossa fluid collection.  - Appreciate Sx-  likely post-operative changes. No acute surgical intervention indicated at the moment  - Appreciate Neuro recs- FU EEG  - Appreciate pysch recs  - FU TFTS in AM  - FU Blood cultures  - FU COVID

## 2020-05-29 NOTE — PROGRESS NOTE ADULT - PROBLEM SELECTOR PLAN 3
-Reports 1 month of dysuria  -UA not fully consistent with UTI with only trace LE, 10-20 WBC, and small bacteria  -As above, monitor off abx and await UCx History obtained by neuro significant for possible absence seizures  -F/u EEG

## 2020-05-29 NOTE — PROGRESS NOTE ADULT - PROBLEM SELECTOR PLAN 1
-Uncertain etiology of abdominal pain.  Imaging with concern for gallbladder fossa fluid collection with concern for hematoma and/or abscess.  Lab tests with improving WBC and normal liver labs.  Exam with minimal abdominal tenderness and tmax of only 100.1 and baseline soft BP.  -Overall the suspicion for intraabdominal infection is relatively low based on clinical findings despite the imaging concern for possible hematoma vs abscess.  -Surgery is following and feels the imaging findings are more consistent with post op changes and recommends against antibiotics.  -Given the relatively low suspicion for intraabdominal infection will monitor off abx to start - received an initial dose of zosyn and macrobid in the ED.  -Check blood cultures and procalcitonin  -Monitor abdominal exam  -If vital sign instability or worsened abdominal pain, nausea or vomiting, will restart zosyn for intraabdominal infection coverage  -On review of prior admission records, baseline blood pressure appears to be around 90-100s/50-60s though.  -Check COVID Patient's signs and symptoms may be explained by polypharmacy and her recent cholecystectomy  -Psych consult for med management  -Continue home meds in the meantime  -TSH elevated likely 2/2 meds, will follow and obtain T3/T4  -I-stop in chart 5/29 Patient's signs and symptoms may be explained by polypharmacy and her recent cholecystectomy  -Psych consult for med management  -Continue home meds in the meantime  -TSH elevated likely 2/2 meds, will follow and obtain T3/T4  -I-stop in chart 5/29  -F/u Utox, serum tox

## 2020-05-29 NOTE — BEHAVIORAL HEALTH ASSESSMENT NOTE - NSBHCHARTREVIEWINVESTIGATE_PSY_A_CORE FT
Ventricular Rate 98 BPM    Atrial Rate 98 BPM    P-R Interval 182 ms    QRS Duration 84 ms    Q-T Interval 346 ms    QTC Calculation(Bezet) 441 ms    P Axis 44 degrees    R Axis -7 degrees    T Axis 30 degrees    Diagnosis Line Normal sinus rhythm  Possible Left atrial enlargement  Nonspecific T wave abnormality  Abnormal ECG

## 2020-05-29 NOTE — BEHAVIORAL HEALTH ASSESSMENT NOTE - NSBHCHARTREVIEWVS_PSY_A_CORE FT
Vital Signs Last 24 Hrs  T(C): 36.9 (29 May 2020 12:39), Max: 37.8 (28 May 2020 19:30)  T(F): 98.5 (29 May 2020 12:39), Max: 100.1 (28 May 2020 19:30)  HR: 85 (29 May 2020 12:39) (85 - 101)  BP: 87/54 (29 May 2020 12:39) (82/50 - 108/69)  BP(mean): --  RR: 15 (29 May 2020 12:39) (14 - 16)  SpO2: 100% (29 May 2020 12:39) (98% - 100%)

## 2020-05-29 NOTE — BEHAVIORAL HEALTH ASSESSMENT NOTE - OTHER
lives in apartment with roommate unable to assess-in bed "fatigued" c/o abnormal gait, stuttering, forgetfulness future oriented ?cluster B traits c/o stuttering, forgetfulness

## 2020-05-29 NOTE — BEHAVIORAL HEALTH ASSESSMENT NOTE - SUMMARY
24 yo , single, female residing with roommate with PMHx urinary incontinence, PSHx cholecystectomy, PPHx BPD, MDD, dysthymic disorder, bipolar disorder with psychotic features, SIB presents to [facility] for c/o forgetfulness, twitching/shaking, eye droop and abnormal gait for last month.  psychiatry consulted for medication management.    Patient seen, a&oX4, reports mood, "fatigued," denies si/hi, denies a/v hallucinations/delusions, no c/o sleep/appetite disturbances, c/o anhedonia, difficulty getting OOB, reports multiple sa in past, multiple inpatient psychiatric hospitalizations, sees Drea Bailey NP [phone].    Upon physical exam, no s/s rigidity, cogwheeling, tardive dyskinesia, speech clear, no stuttering noted, good eye contact, able to name medications and dosages.    PLAN:  -d/c Wellbutrin-can lower seizure threshold   -DECREASE Luvox to 50mg HS for 3 days then d/c on Monday, June 1  -c/w Clozapine 25mg daily, 300mg HS-monitor WBC's, monitor orthostatics  -c/w Trazodone 200mg HS, hold if qtc >500  -c/w Ativan 2mg HS-monitor for sedation, hold if RR<12  -c/w Lithium 300mg daily, 600mg HS-monitor BUN/CR  -Obtain T3,T4 given TSH elevated  -Ativan 2mg PO/IM/IV Q6H PRN -agitation, monitor for sedation, hold if RR<12  -Monitor EKG qtc interval     psychiatry to follow patient. 24 yo , single, female residing with roommate with PMHx urinary incontinence, PSHx cholecystectomy, PPHx BPD, MDD, dysthymic disorder, bipolar disorder with psychotic features, SIB presents to [facility] for c/o forgetfulness, twitching/shaking, eye droop and abnormal gait for last month.  psychiatry consulted for medication management.    Patient seen, a&oX4, reports mood, "fatigued," denies si/hi, denies a/v hallucinations/delusions, no c/o sleep/appetite disturbances, c/o anhedonia, difficulty getting OOB, reports multiple sa in past, multiple inpatient psychiatric hospitalizations, sees Drea Bailey NP [phone].    Upon physical exam, no s/s rigidity, cogwheeling, tardive dyskinesia, speech clear, no stuttering noted, good eye contact, able to name medications and dosages.    PLAN:  -d/c Wellbutrin-can lower seizure threshold   -d/c Adderall-can lower seizure threshold  -DECREASE Luvox to 50mg HS for 3 days then d/c on Monday, June 1  -c/w Clozapine 25mg daily, 300mg HS-monitor WBC's, monitor orthostatics  -c/w Trazodone 200mg HS, hold if qtc >500  -c/w Ativan 2mg HS-monitor for sedation, hold if RR<12  -c/w Lithium 300mg daily, 600mg HS-monitor BUN/CR  -Obtain T3,T4 given TSH elevated  -Ativan 2mg PO/IM/IV Q6H PRN -agitation, monitor for sedation, hold if RR<12  -Monitor EKG qtc interval     psychiatry to follow patient. 26 yo , single, female residing with roommate with PMHx urinary incontinence, PSHx cholecystectomy, as per chart, PPHx BPD, MDD, dysthymic disorder, bipolar disorder with psychotic features, SIB presents to [facility] for c/o forgetfulness, twitching/shaking, eye droop and abnormal gait for last month. [facility] psychiatry consulted for medication management.    Patient seen, a&oX4, reports mood, "fatigued," denies si/hi, denies a/v hallucinations/delusions, no c/o sleep/appetite disturbances, c/o anhedonia, at times difficulty getting OOB, reports multiple sa in past, multiple inpatient psychiatric hospitalizations, sees Drea Bailey NP [phone].    Upon physical exam, no s/s rigidity, cogwheeling, speech clear, no stuttering noted, good eye contact, able to name medications and dosages.    Please note that Clozapine can lower seizure threshold, however patient has been stable on Clozapine for many years. Low suspicion at this time and will continue the clozapine to prevent psychiatric decompensation    PLAN:  -d/c Wellbutrin-can lower seizure threshold   -d/c Adderall- due to current concerns of seizures .  -DECREASE Luvox to 50mg HS for 3 days then d/c on Monday, June 1 (it was started a month back, pt. noticed stuttering since then)   -c/w Clozapine 25mg daily, 300mg HS-monitor WBC's, ANCs, monitor orthostatics  -c/w Trazodone 200mg HS, hold if qtc >500  -c/w Ativan 2mg HS-monitor for sedation, hold if RR<12  -c/w Lithium 300mg daily, 600mg HS-monitor BUN/CR  -Obtain T3,T4 given TSH elevated  -Ativan 2mg PO/IM/IV Q6H PRN -agitation, monitor for sedation, hold if RR<12  -Monitor EKG qtc interval  -Obtain B-HCG   psychiatry to follow patient.

## 2020-05-30 DIAGNOSIS — N39.0 URINARY TRACT INFECTION, SITE NOT SPECIFIED: ICD-10-CM

## 2020-05-30 DIAGNOSIS — R41.0 DISORIENTATION, UNSPECIFIED: ICD-10-CM

## 2020-05-30 LAB
-  AMPICILLIN: SIGNIFICANT CHANGE UP
-  CIPROFLOXACIN: SIGNIFICANT CHANGE UP
-  LEVOFLOXACIN: SIGNIFICANT CHANGE UP
-  NITROFURANTOIN: SIGNIFICANT CHANGE UP
-  TETRACYCLINE: SIGNIFICANT CHANGE UP
-  VANCOMYCIN: SIGNIFICANT CHANGE UP
ANION GAP SERPL CALC-SCNC: 10 MMO/L — SIGNIFICANT CHANGE UP (ref 7–14)
APAP SERPL-MCNC: < 15 UG/ML — LOW (ref 15–25)
BASOPHILS # BLD AUTO: 0.05 K/UL — SIGNIFICANT CHANGE UP (ref 0–0.2)
BASOPHILS NFR BLD AUTO: 0.4 % — SIGNIFICANT CHANGE UP (ref 0–2)
BUN SERPL-MCNC: 5 MG/DL — LOW (ref 7–23)
CALCIUM SERPL-MCNC: 9.1 MG/DL — SIGNIFICANT CHANGE UP (ref 8.4–10.5)
CHLORIDE SERPL-SCNC: 110 MMOL/L — HIGH (ref 98–107)
CO2 SERPL-SCNC: 19 MMOL/L — LOW (ref 22–31)
CREAT SERPL-MCNC: 0.67 MG/DL — SIGNIFICANT CHANGE UP (ref 0.5–1.3)
CULTURE RESULTS: SIGNIFICANT CHANGE UP
EOSINOPHIL # BLD AUTO: 0.27 K/UL — SIGNIFICANT CHANGE UP (ref 0–0.5)
EOSINOPHIL NFR BLD AUTO: 2.4 % — SIGNIFICANT CHANGE UP (ref 0–6)
ETHANOL BLD-MCNC: < 10 MG/DL — SIGNIFICANT CHANGE UP
GLUCOSE SERPL-MCNC: 84 MG/DL — SIGNIFICANT CHANGE UP (ref 70–99)
HCT VFR BLD CALC: 32.8 % — LOW (ref 34.5–45)
HGB BLD-MCNC: 11.4 G/DL — LOW (ref 11.5–15.5)
IMM GRANULOCYTES NFR BLD AUTO: 0.5 % — SIGNIFICANT CHANGE UP (ref 0–1.5)
LITHIUM SERPL-MCNC: 0.69 MMOL/L — SIGNIFICANT CHANGE UP (ref 0.6–1.2)
LYMPHOCYTES # BLD AUTO: 2.92 K/UL — SIGNIFICANT CHANGE UP (ref 1–3.3)
LYMPHOCYTES # BLD AUTO: 26.1 % — SIGNIFICANT CHANGE UP (ref 13–44)
MAGNESIUM SERPL-MCNC: 2.5 MG/DL — SIGNIFICANT CHANGE UP (ref 1.6–2.6)
MCHC RBC-ENTMCNC: 32.1 PG — SIGNIFICANT CHANGE UP (ref 27–34)
MCHC RBC-ENTMCNC: 34.8 % — SIGNIFICANT CHANGE UP (ref 32–36)
MCV RBC AUTO: 92.4 FL — SIGNIFICANT CHANGE UP (ref 80–100)
METHOD TYPE: SIGNIFICANT CHANGE UP
MONOCYTES # BLD AUTO: 0.74 K/UL — SIGNIFICANT CHANGE UP (ref 0–0.9)
MONOCYTES NFR BLD AUTO: 6.6 % — SIGNIFICANT CHANGE UP (ref 2–14)
NEUTROPHILS # BLD AUTO: 7.14 K/UL — SIGNIFICANT CHANGE UP (ref 1.8–7.4)
NEUTROPHILS NFR BLD AUTO: 64 % — SIGNIFICANT CHANGE UP (ref 43–77)
NRBC # FLD: 0 K/UL — SIGNIFICANT CHANGE UP (ref 0–0)
ORGANISM # SPEC MICROSCOPIC CNT: SIGNIFICANT CHANGE UP
ORGANISM # SPEC MICROSCOPIC CNT: SIGNIFICANT CHANGE UP
PHOSPHATE SERPL-MCNC: 2.4 MG/DL — LOW (ref 2.5–4.5)
PLATELET # BLD AUTO: 346 K/UL — SIGNIFICANT CHANGE UP (ref 150–400)
PMV BLD: 10.3 FL — SIGNIFICANT CHANGE UP (ref 7–13)
POTASSIUM SERPL-MCNC: 3.9 MMOL/L — SIGNIFICANT CHANGE UP (ref 3.5–5.3)
POTASSIUM SERPL-SCNC: 3.9 MMOL/L — SIGNIFICANT CHANGE UP (ref 3.5–5.3)
RBC # BLD: 3.55 M/UL — LOW (ref 3.8–5.2)
RBC # FLD: 13.2 % — SIGNIFICANT CHANGE UP (ref 10.3–14.5)
SALICYLATES SERPL-MCNC: < 5 MG/DL — LOW (ref 15–30)
SODIUM SERPL-SCNC: 139 MMOL/L — SIGNIFICANT CHANGE UP (ref 135–145)
SPECIMEN SOURCE: SIGNIFICANT CHANGE UP
T3FREE SERPL-MCNC: 2.32 PG/ML — SIGNIFICANT CHANGE UP (ref 1.8–4.6)
T4 FREE SERPL-MCNC: 0.98 NG/DL — SIGNIFICANT CHANGE UP (ref 0.9–1.8)
TSH SERPL-MCNC: 4.22 UIU/ML — HIGH (ref 0.27–4.2)
WBC # BLD: 11.18 K/UL — HIGH (ref 3.8–10.5)
WBC # FLD AUTO: 11.18 K/UL — HIGH (ref 3.8–10.5)

## 2020-05-30 PROCEDURE — 99232 SBSQ HOSP IP/OBS MODERATE 35: CPT

## 2020-05-30 PROCEDURE — 99233 SBSQ HOSP IP/OBS HIGH 50: CPT | Mod: GC

## 2020-05-30 PROCEDURE — 99233 SBSQ HOSP IP/OBS HIGH 50: CPT

## 2020-05-30 RX ORDER — CEFTRIAXONE 500 MG/1
1000 INJECTION, POWDER, FOR SOLUTION INTRAMUSCULAR; INTRAVENOUS ONCE
Refills: 0 | Status: COMPLETED | OUTPATIENT
Start: 2020-05-30 | End: 2020-05-30

## 2020-05-30 RX ORDER — SODIUM,POTASSIUM PHOSPHATES 278-250MG
1 POWDER IN PACKET (EA) ORAL
Refills: 0 | Status: COMPLETED | OUTPATIENT
Start: 2020-05-30 | End: 2020-05-31

## 2020-05-30 RX ORDER — SODIUM CHLORIDE 9 MG/ML
1000 INJECTION, SOLUTION INTRAVENOUS ONCE
Refills: 0 | Status: COMPLETED | OUTPATIENT
Start: 2020-05-30 | End: 2020-05-30

## 2020-05-30 RX ORDER — ZONISAMIDE 100 MG
200 CAPSULE ORAL AT BEDTIME
Refills: 0 | Status: DISCONTINUED | OUTPATIENT
Start: 2020-05-30 | End: 2020-06-03

## 2020-05-30 RX ORDER — ZONISAMIDE 100 MG
100 CAPSULE ORAL AT BEDTIME
Refills: 0 | Status: DISCONTINUED | OUTPATIENT
Start: 2020-05-30 | End: 2020-05-30

## 2020-05-30 RX ORDER — CEFTRIAXONE 500 MG/1
INJECTION, POWDER, FOR SOLUTION INTRAMUSCULAR; INTRAVENOUS
Refills: 0 | Status: DISCONTINUED | OUTPATIENT
Start: 2020-05-30 | End: 2020-06-01

## 2020-05-30 RX ORDER — CLOZAPINE 150 MG/1
150 TABLET, ORALLY DISINTEGRATING ORAL AT BEDTIME
Refills: 0 | Status: DISCONTINUED | OUTPATIENT
Start: 2020-05-30 | End: 2020-06-05

## 2020-05-30 RX ORDER — CEFTRIAXONE 500 MG/1
1000 INJECTION, POWDER, FOR SOLUTION INTRAMUSCULAR; INTRAVENOUS EVERY 24 HOURS
Refills: 0 | Status: DISCONTINUED | OUTPATIENT
Start: 2020-05-31 | End: 2020-06-01

## 2020-05-30 RX ADMIN — Medication 1 TABLET(S): at 17:24

## 2020-05-30 RX ADMIN — Medication 1 TABLET(S): at 21:43

## 2020-05-30 RX ADMIN — SODIUM CHLORIDE 1000 MILLILITER(S): 9 INJECTION, SOLUTION INTRAVENOUS at 12:04

## 2020-05-30 RX ADMIN — FLUVOXAMINE MALEATE 50 MILLIGRAM(S): 25 TABLET ORAL at 21:44

## 2020-05-30 RX ADMIN — Medication 2000 UNIT(S): at 11:59

## 2020-05-30 RX ADMIN — Medication 200 MILLIGRAM(S): at 21:45

## 2020-05-30 RX ADMIN — LITHIUM CARBONATE 600 MILLIGRAM(S): 300 TABLET, EXTENDED RELEASE ORAL at 21:45

## 2020-05-30 RX ADMIN — Medication 2 MILLIGRAM(S): at 21:44

## 2020-05-30 RX ADMIN — CLOZAPINE 150 MILLIGRAM(S): 150 TABLET, ORALLY DISINTEGRATING ORAL at 21:45

## 2020-05-30 RX ADMIN — SENNA PLUS 2 TABLET(S): 8.6 TABLET ORAL at 21:43

## 2020-05-30 RX ADMIN — LITHIUM CARBONATE 300 MILLIGRAM(S): 300 TABLET, EXTENDED RELEASE ORAL at 11:59

## 2020-05-30 RX ADMIN — CEFTRIAXONE 100 MILLIGRAM(S): 500 INJECTION, POWDER, FOR SOLUTION INTRAMUSCULAR; INTRAVENOUS at 09:56

## 2020-05-30 NOTE — PROGRESS NOTE BEHAVIORAL HEALTH - NSBHFUPINTERVALHXFT_PSY_A_CORE
Chart reviewed. Patient had spot EEG which per neuro chart note... "shows occasional spike/polyspike-wave discharges in right frontotemporal (F8/T8 >>Fp2/F4), left frontocentral (max F3/C3), broad left hemisphere (max occipital), or generalized in appearance (max occipitally) - presence of both generalized and focal epileptiform activity. No seizures were seen."  Also found to have UTI as well as retaining urine.    This AM seen. She is found to be lethargic with poor attention. She cannot consistently stay awake for exam, she barely follows commands. Denies SI/HI. Denies AH/VH. She is not rigid, she has no myoclonus. She is not oriented date. She knew she was in a hospital, she knew the name of hospital. However she could not explain what was going on.

## 2020-05-30 NOTE — PROGRESS NOTE ADULT - ASSESSMENT
25F PMH bipolar disorder, borderline personality disorder, MDD, urinary incontinence, recent cholecystectomy for acute cholecystitis who presents for worsening urinary incontinence and abdominal pain, with other vague complaints, likely related to post-op pain and profound polypharmacy.

## 2020-05-30 NOTE — PROGRESS NOTE ADULT - PROBLEM SELECTOR PLAN 4
History obtained by neuro significant for possible absence seizures  -Spot EEG showing areas of epileptiform activity however no seizures   -Neuro rec inpatient 24 EEG if admitted or outpt EEG if to be dcd  -24 hour vEEG ordered  -C/w zonisamide 100 mg qhs. increase by 100mg every 7 days. goal of 300mg qhs  -hold Welbutrin and Adderall. WIll consider DCing trazadone   -may benefit from Lamictal. would have to be titrated up slowly as outpatient.

## 2020-05-30 NOTE — PROGRESS NOTE ADULT - PROBLEM SELECTOR PLAN 3
-UCx growing GPC in pairs and chains. Concern for GBS UTI vs contaminant  -Acute urinary retention this AM 600mL on straight cath. Hx of urin incont.   -Start CTX 1g daily for 5 day course (received 1 full day already)  -Await speciation of UCx

## 2020-05-30 NOTE — PROGRESS NOTE BEHAVIORAL HEALTH - RISK ASSESSMENT
Patient right now is delirious. Per collateral had been doing quite well prior to this hospitalization. Cannot complete risk assessment.

## 2020-05-30 NOTE — PROGRESS NOTE ADULT - ATTENDING COMMENTS
A 25 year old woman with a hx of bipolar disorder, borderline personality disorder, urinary incontinence, and recent cholecystectomy for acute cholecystitis who presents for stuttering, forgetfulness and abnormal gait and had abdominal pain in the ED with CT findings concerning for GB fossa fluid collection. Patient seen and evaluated at bedside. No new complaints, no overnight events.     - Appreciate Sx- likely post-operative changes. No acute surgical intervention indicated.  - Appreciate Neuro recs- EEG showed EP activity, f/u 24 hr EEG, Cont Zonisamide, seizure ppx.  - Appreciate pysch recs- d/c Wellbutyrin, Adderall, decrease Luvox, continue Clozapine.   - Antibiotic course for UTI- Ceftriaxone x3 days.   - TFTs wnl, COVID negative.  - Need Psych, Neuro recs prior to discharge.   - Dispo: likely home when acute issues resolve, 1-2 days. A 25 year old woman with a hx of bipolar disorder, borderline personality disorder, urinary incontinence, and recent cholecystectomy for acute cholecystitis who presents for stuttering, forgetfulness and abnormal gait and had abdominal pain in the ED with CT findings concerning for GB fossa fluid collection. Patient seen and evaluated at bedside. No new complaints, no overnight events.     - Appreciate Sx- likely post-operative changes. No acute surgical intervention indicated.  - Appreciate Neuro recs- EEG showed EP activity, f/u 24 hr EEG, Cont Zonisamide, seizure ppx.  - Appreciate pysch recs- d/c Wellbutyrin, Adderall, decrease Luvox.  - Continue Trazodone, Clozapine, Lithium. Ativan PRN.   - Antibiotic course for UTI- Ceftriaxone x3 days.   - QTC monitoring, WBC, Renal function monitoring.   - TFTs wnl, COVID negative.  - Need Psych, Neuro recs prior to discharge.   - Dispo: likely home when acute issues resolve, 1-2 days.

## 2020-05-30 NOTE — PROGRESS NOTE ADULT - ASSESSMENT
25y Female with PMHx of bipolar, MDD, borderline personality disorder, urinary incontinence, s/p cholecystectomy here w/ stuttering, forgetfulness, abnormal gait for 1 month. CT head w/ volume loss, CTA H/N with no LVO. Sx Likely 2/2 medication side effects in possibly in conjunction with toxic/metabolic dysfunction, although the "spacing out" events occurring in the setting of both antipsychotic use (clozaril) as well as wellbutrin does raise concern for possible subclinical seizure, although suspicion somewhat low in my judgment. At this time, would recommend patient not drive as well as follow up with a neurologist for EEG. Discussed not driving with patient until cleared by outpatient neurologist.     Recs:    B12, TSH, folate, MMA and homocysteine and copper levels   Outpatient EEG but if patient admitted will obtain routine inpatient EEG  No further acute neurologic intervention at this time    Follow up with Dr. Michael Nissenbaum([phone]) outpatient    No driving, operating heavy machinery, water sports/bathing, activity in elevation without appropriate safety precautions    mgmt d/w Neurology Attending Dr. Franco ***INCOMPLETE NOTE AT THIS TIME****  25y Female with PMHx of bipolar, MDD, borderline personality disorder, urinary incontinence, s/p cholecystectomy here w/ stuttering, forgetfulness, abnormal gait for 1 month. CT head w/ volume loss, CTA H/N with no LVO. Sx Likely 2/2 medication side effects in possibly in conjunction with toxic/metabolic dysfunction, although the "spacing out" events occurring in the setting of both antipsychotic use (clozaril) as well as wellbutrin does raise concern for possible subclinical seizure, although suspicion somewhat low in my judgment. At this time, would recommend patient not drive as well as follow up with a neurologist for EEG. Discussed not driving with patient until cleared by outpatient neurologist.     Recs:    B12, TSH, folate, MMA and homocysteine and copper levels   Outpatient EEG but if patient admitted will obtain routine inpatient EEG  No further acute neurologic intervention at this time    Follow up with Dr. Michael Nissenbaum([phone]) outpatient    No driving, operating heavy machinery, water sports/bathing, activity in elevation without appropriate safety precautions    mgmt d/w Neurology Attending Dr. Franco 25y Female with PMHx of bipolar, MDD, borderline personality disorder, urinary incontinence, s/p cholecystectomy here w/ stuttering, forgetfulness, abnormal gait for 1 month. CT head w/ volume loss, CTA H/N with no LVO. Sx Likely 2/2 medication side effects in possibly in conjunction with toxic/metabolic dysfunction, although the "spacing out" events occurring in the setting of both antipsychotic use (clozaril) as well as wellbutrin does raise concern for possible subclinical seizure, although suspicion somewhat low in my judgment. At this time, would recommend patient not drive as well as follow up with a neurologist for EEG. Discussed not driving with patient until cleared by outpatient neurologist.     Impression: concern for possible generalized epilepsy    Recs:    B12, TSH, folate, MMA and homocysteine and copper levels   vEEG - to be hooked up 5/31 - EEG tech aware (please call [phone] 5/31 AM)  c/w zonisamide 100mg qHS    Follow up with Dr. Michael Nissenbaum([phone]) outpatient    No driving, operating heavy machinery, water sports/bathing, activity in elevation without appropriate safety precautions    mgmt d/w Neurology Attending Dr. Franco 25y Female with PMHx of bipolar, MDD, borderline personality disorder, urinary incontinence, s/p cholecystectomy here w/ stuttering, forgetfulness, abnormal gait for 1 month. CT head w/ volume loss, CTA H/N with no LVO. Sx Likely 2/2 medication side effects in possibly in conjunction with toxic/metabolic dysfunction, although the "spacing out" events occurring in the setting of both antipsychotic use (clozaril) as well as wellbutrin does raise concern for possible subclinical seizure, although suspicion somewhat low in my judgment. At this time, would recommend patient not drive as well as follow up with a neurologist for EEG. Discussed not driving with patient until cleared by outpatient neurologist.     Impression: concern for possible generalized epilepsy    Recs:    B12, TSH, folate, MMA and homocysteine and copper levels   vEEG - to be hooked up 5/31 - EEG tech aware (please call [phone] 5/31 AM)  increase zonisamide 5/30PM to 200mg qHS    Follow up with Dr. Michael Nissenbaum([phone]) outpatient    No driving, operating heavy machinery, water sports/bathing, activity in elevation without appropriate safety precautions    mgmt d/w Neurology Attending Dr. Franco 25y Female with PMHx of bipolar, MDD, borderline personality disorder, urinary incontinence, s/p cholecystectomy here w/ stuttering, forgetfulness, abnormal gait for 1 month. CT head w/ volume loss, CTA H/N with no LVO. Sx Likely 2/2 medication side effects in possibly in conjunction with toxic/metabolic dysfunction, although the "spacing out" events occurring in the setting of both antipsychotic use (clozaril) as well as wellbutrin does raise concern for possible subclinical seizure, although suspicion somewhat low in my judgment. At this time, would recommend patient not drive as well as follow up with a neurologist Discussed not driving with patient until cleared by outpatient neurologist.     Impression:  possible generalized epilepsy    Recs:    B12, TSH, folate, MMA and homocysteine and copper levels   vEEG - to be hooked up 5/31 - EEG tech aware (please call [phone] 5/31 AM)  increase zonisamide 5/30PM to 200mg qHS  MRI brain with and without contrast Sz protocol    Follow up with Dr. Michael Nissenbaum([phone]) outpatient    No driving, operating heavy machinery, water sports/bathing, activity in elevation without appropriate safety precautions    mgmt d/w Neurology Attending Dr. Franco

## 2020-05-30 NOTE — CHART NOTE - NSCHARTNOTEFT_GEN_A_CORE
Patient likely has generalized epilepsy.    Patient will need MRI brain epilepsy protocol w/w/o contrast    d/w Neurology Attending Dr. Franco, primary team

## 2020-05-30 NOTE — PROGRESS NOTE ADULT - SUBJECTIVE AND OBJECTIVE BOX
SUBJECTIVE: patient seen and examined at bedside.     MEDICATIONS (HOME):  Home Medications:  acetaminophen 325 mg oral tablet: 2 tab(s) orally every 6 hours, As needed, Mild Pain (1 - 3) (29 May 2020 02:24)  Amitiza 24 mcg oral capsule: 1 cap(s) orally 2 times a day (29 May 2020 02:24)  amphetamine salt: 10 milligram(s) orally once a day (29 May 2020 02:24)  buPROPion 75 mg oral tablet: 1 tab(s) orally once a day (29 May 2020 02:24)  cholecalciferol oral tablet: 2000 unit(s) orally once a day (29 May 2020 02:24)  cloZAPine 100 mg oral tablet: 3 tab(s) orally once a day (at bedtime) (29 May 2020 02:24)  cloZAPine 25 mg oral tablet: 1 tab(s) orally once a day (29 May 2020 02:24)  dextroamphetamine-amphetamine 5 mg oral tablet: 1 tab(s) orally once a day (29 May 2020 02:24)  fluvoxaMINE: 100 milligram(s) orally once a day (at bedtime) (29 May 2020 02:24)  lithium 300 mg oral tablet, extended release: 1 tab(s) orally once a day (29 May 2020 02:24)  lithium 300 mg oral tablet, extended release: 2 tab(s) orally once a day (at bedtime) (29 May 2020 02:24)  LORazepam 2 mg oral tablet: 1 tab(s) orally once a day (at bedtime) (29 May 2020 02:24)  melatonin 3 mg oral tablet: 2 tab(s) orally , As needed, Insomnia (29 May 2020 02:24)  mybetriq: 50 milligram(s) orally once a day (29 May 2020 02:24)  notrel: 1 tab(s) orally once a day (29 May 2020 02:)  traZODone 100 mg oral tablet: 2 tab(s) orally once a day (at bedtime) (29 May 2020 02:24)    MEDICATIONS  (STANDING):  cholecalciferol 2000 Unit(s) Oral daily  cloZAPine 300 milliGRAM(s) Oral at bedtime  cloZAPine 25 milliGRAM(s) Oral daily  fluvoxaMINE 50 milliGRAM(s) Oral at bedtime  lithium 300 milliGRAM(s) Oral daily  lithium 600 milliGRAM(s) Oral at bedtime  LORazepam     Tablet 2 milliGRAM(s) Oral at bedtime  senna 2 Tablet(s) Oral at bedtime  traZODone 200 milliGRAM(s) Oral at bedtime  zonisamide 100 milliGRAM(s) Oral at bedtime    MEDICATIONS  (PRN):  LORazepam     Tablet 2 milliGRAM(s) Oral every 6 hours PRN Agitation  ondansetron Injectable 4 milliGRAM(s) IV Push three times a day PRN Nausea and/or Vomiting  oxyCODONE    IR 5 milliGRAM(s) Oral every 4 hours PRN Moderate Pain (4 - 6)  oxyCODONE    IR 10 milliGRAM(s) Oral every 4 hours PRN Severe Pain (7 - 10)    ALLERGIES/INTOLERANCES:  Allergies  No Known Allergies    VITALS & EXAMINATION:  Vital Signs Last 24 Hrs  T(C): 36.8 (30 May 2020 06:13), Max: 37 (29 May 2020 09:30)  T(F): 98.3 (30 May 2020 06:13), Max: 98.6 (29 May 2020 09:30)  HR: 93 (30 May 2020 06:13) (85 - 96)  BP: 91/59 (30 May 2020 06:13) (82/50 - 93/61)  BP(mean): --  RR: 17 (30 May 2020 06:13) (14 - 17)  SpO2: 99% (30 May 2020 06:13) (98% - 100%)    Neurological (>12):  MS: Awake, alert, oriented to person, place, situation, time. Normal affect. Follows all commands.    Language: Speech is clear, fluent with good repetition & comprehension     CNs: EOMI, VFF. well developed masseter muscles b/l. No facial asymmetry b/l. Symmetric palate elevation in midline. Gag reflex deferred. Tongue midline, normal movements, no atrophy.    Motor: Normal muscle bulk & tone. No noticeable tremor or seizure.  Able to lift BUE > 10 secs w/o drift  Able to lift BLE > 5 secs w/o drift    Coordination: Bilateral FTN and RACHID intact    LABORATORY:  CBC                       9.9    11.69 )-----------( 358      ( 29 May 2020 06:10 )             27.8     Chem 05-29    139  |  109<H>  |  7   ----------------------------<  115<H>  3.8   |  18<L>  |  0.71    Ca    8.2<L>      29 May 2020 06:10  Phos  2.6       Mg     2.2         TPro  6.5  /  Alb  3.4  /  TBili  < 0.2<L>  /  DBili  x   /  AST  17  /  ALT  17  /  AlkPhos  67      LFTs LIVER FUNCTIONS - ( 29 May 2020 06:10 )  Alb: 3.4 g/dL / Pro: 6.5 g/dL / ALK PHOS: 67 u/L / ALT: 17 u/L / AST: 17 u/L / GGT: x           U/A Urinalysis Basic - ( 28 May 2020 16:15 )    Color: YELLOW / Appearance: Lt TURBID / S.023 / pH: 7.0  Gluc: NEGATIVE / Ketone: NEGATIVE  / Bili: NEGATIVE / Urobili: TRACE   Blood: NEGATIVE / Protein: 200 / Nitrite: NEGATIVE   Leuk Esterase: TRACE / RBC: x / WBC 10-20   Sq Epi: x / Non Sq Epi: FEW / Bacteria: SMALL    STUDIES & IMAGING:  Studies (EKG, EEG, EMG, etc):     Radiology (XR, CT, MR, U/S, TTE/JERRI):    < from: CT Angio Head w/ IV Cont (20 @ 17:57) >  IMPRESSION:     CT HEAD: No acute intracranial bleeding, mass effect, or shift.     Volume loss, greater than expected for patient's age.    CTA BRAIN: Patent intracranial circulation. No flow-limiting stenosis or occlusion.     CTA NECK: Patent cervical vasculature. No flow limiting stenosis or occlusion.     < end of copied text > SUBJECTIVE: patient seen and examined at bedside. Was notified by psychiatry this morning that patient had minimal mental status. Examined patient with Neurology Attending and patient was appearing sleepy but was easily aroused and having conversation with Neurology team.     MEDICATIONS (HOME):  Home Medications:  acetaminophen 325 mg oral tablet: 2 tab(s) orally every 6 hours, As needed, Mild Pain (1 - 3) (29 May 2020 02:)  Amitiza 24 mcg oral capsule: 1 cap(s) orally 2 times a day (29 May 2020 02:24)  amphetamine salt: 10 milligram(s) orally once a day (29 May 2020 02:24)  buPROPion 75 mg oral tablet: 1 tab(s) orally once a day (29 May 2020 02:24)  cholecalciferol oral tablet: 2000 unit(s) orally once a day (29 May 2020 02:24)  cloZAPine 100 mg oral tablet: 3 tab(s) orally once a day (at bedtime) (29 May 2020 02:24)  cloZAPine 25 mg oral tablet: 1 tab(s) orally once a day (29 May 2020 02:24)  dextroamphetamine-amphetamine 5 mg oral tablet: 1 tab(s) orally once a day (29 May 2020 02:24)  fluvoxaMINE: 100 milligram(s) orally once a day (at bedtime) (29 May 2020 02:24)  lithium 300 mg oral tablet, extended release: 1 tab(s) orally once a day (29 May 2020 02:24)  lithium 300 mg oral tablet, extended release: 2 tab(s) orally once a day (at bedtime) (29 May 2020 02:24)  LORazepam 2 mg oral tablet: 1 tab(s) orally once a day (at bedtime) (29 May 2020 02:24)  melatonin 3 mg oral tablet: 2 tab(s) orally , As needed, Insomnia (29 May 2020 02:24)  mybetriq: 50 milligram(s) orally once a day (29 May 2020 02:24)  notrel: 1 tab(s) orally once a day (29 May 2020 02:24)  traZODone 100 mg oral tablet: 2 tab(s) orally once a day (at bedtime) (29 May 2020 02:24)    MEDICATIONS  (STANDING):  cholecalciferol 2000 Unit(s) Oral daily  cloZAPine 300 milliGRAM(s) Oral at bedtime  cloZAPine 25 milliGRAM(s) Oral daily  fluvoxaMINE 50 milliGRAM(s) Oral at bedtime  lithium 300 milliGRAM(s) Oral daily  lithium 600 milliGRAM(s) Oral at bedtime  LORazepam     Tablet 2 milliGRAM(s) Oral at bedtime  senna 2 Tablet(s) Oral at bedtime  traZODone 200 milliGRAM(s) Oral at bedtime  zonisamide 100 milliGRAM(s) Oral at bedtime    MEDICATIONS  (PRN):  LORazepam     Tablet 2 milliGRAM(s) Oral every 6 hours PRN Agitation  ondansetron Injectable 4 milliGRAM(s) IV Push three times a day PRN Nausea and/or Vomiting  oxyCODONE    IR 5 milliGRAM(s) Oral every 4 hours PRN Moderate Pain (4 - 6)  oxyCODONE    IR 10 milliGRAM(s) Oral every 4 hours PRN Severe Pain (7 - 10)    ALLERGIES/INTOLERANCES:  Allergies  No Known Allergies    VITALS & EXAMINATION:  Vital Signs Last 24 Hrs  T(C): 36.8 (30 May 2020 06:13), Max: 37 (29 May 2020 09:30)  T(F): 98.3 (30 May 2020 06:13), Max: 98.6 (29 May 2020 09:30)  HR: 93 (30 May 2020 06:13) (85 - 96)  BP: 91/59 (30 May 2020 06:13) (82/50 - 93/61)  BP(mean): --  RR: 17 (30 May 2020 06:13) (14 - 17)  SpO2: 99% (30 May 2020 06:13) (98% - 100%)    Neurological (>12):  MS: Awake, alert, oriented to person, place, situation, time. Normal affect. Follows all commands.    Language: Speech is clear, fluent with good repetition & comprehension     CNs: EOMI, VFF. well developed masseter muscles b/l. No facial asymmetry b/l. Symmetric palate elevation in midline. Gag reflex deferred. Tongue midline, normal movements, no atrophy.    Motor: Normal muscle bulk & tone. No noticeable tremor or seizure.  Able to lift BUE > 10 secs w/o drift  Able to lift BLE > 5 secs w/o drift    Coordination: Bilateral FTN and RACHID intact    LABORATORY:  CBC                       9.9    11.69 )-----------( 358      ( 29 May 2020 06:10 )             27.8     Chem     139  |  109<H>  |  7   ----------------------------<  115<H>  3.8   |  18<L>  |  0.71    Ca    8.2<L>      29 May 2020 06:10  Phos  2.6       Mg     2.2         TPro  6.5  /  Alb  3.4  /  TBili  < 0.2<L>  /  DBili  x   /  AST  17  /  ALT  17  /  AlkPhos  67      LFTs LIVER FUNCTIONS - ( 29 May 2020 06:10 )  Alb: 3.4 g/dL / Pro: 6.5 g/dL / ALK PHOS: 67 u/L / ALT: 17 u/L / AST: 17 u/L / GGT: x           U/A Urinalysis Basic - ( 28 May 2020 16:15 )    Color: YELLOW / Appearance: Lt TURBID / S.023 / pH: 7.0  Gluc: NEGATIVE / Ketone: NEGATIVE  / Bili: NEGATIVE / Urobili: TRACE   Blood: NEGATIVE / Protein: 200 / Nitrite: NEGATIVE   Leuk Esterase: TRACE / RBC: x / WBC 10-20   Sq Epi: x / Non Sq Epi: FEW / Bacteria: SMALL    STUDIES & IMAGING:  Studies (EKG, EEG, EMG, etc):     Radiology (XR, CT, MR, U/S, TTE/JERRI):    < from: CT Angio Head w/ IV Cont (20 @ 17:57) >  IMPRESSION:     CT HEAD: No acute intracranial bleeding, mass effect, or shift.     Volume loss, greater than expected for patient's age.    CTA BRAIN: Patent intracranial circulation. No flow-limiting stenosis or occlusion.     CTA NECK: Patent cervical vasculature. No flow limiting stenosis or occlusion.     < end of copied text >    Abnormal EEG study.  1. Presence of both focal and generalized epileptiform discharges, which suggest either focal and generalized epilepsy, focal epilepsy with rapid bilateral synchrony, or generalized epilepsy with fragmented discharges. Correlate clinically.  2. Mild to moderate nonspecific diffuse or multifocal cerebral dysfunction.   3. No seizure seen.

## 2020-05-30 NOTE — PROGRESS NOTE ADULT - PROBLEM SELECTOR PLAN 2
Etiology likely post-op pain considering pt recently had cholecystectomy. Imaging with concern for gallbladder fossa fluid collection with concern for hematoma and/or abscess.  Lab tests with improving WBC and normal liver labs.  Exam with minimal abdominal tenderness and tmax of only 100.1 and baseline soft BP.  -Low suspicion for infection despite CT findings. Surgery in agreement with no abx.  -S/p zosyn and macrobid in the ED but will monitor off abx  -F/u cultures  -Monitor abdominal exam  -If vital sign instability or worsened abdominal pain, nausea or vomiting, can restart zosyn for intraabdominal infection coverage  -On review of prior admission records, baseline blood pressure appears to be around 90-100s/50-60s.  -F/u COVID PCR

## 2020-05-30 NOTE — PROGRESS NOTE ADULT - PROBLEM SELECTOR PLAN 1
Patient's signs and symptoms may be explained by polypharmacy and her recent cholecystectomy  -Psych consult for med management  -Continue home meds in the meantime  -TSH elevated likely 2/2 meds, will follow and obtain T3/T4  -I-stop in chart 5/29  -F/u Utox, serum tox

## 2020-05-30 NOTE — PROGRESS NOTE ADULT - PROBLEM SELECTOR PLAN 5
-d/c Wellbutrin and Adderall due to current concerns of seizures  -DECREASE Luvox to 50mg HS for 3 days then d/c on Monday, June 1   -c/w Clozapine 25mg daily, 300mg HS-monitor WBC's, ANCs, monitor orthostatics  -c/w Trazodone 200mg HS, hold if qtc >500. Will consider DCing due to decreasing seizure threshold  -c/w Ativan 2mg HS-monitor for sedation, hold if RR<12  -c/w Lithium 300mg daily, 600mg HS-monitor BUN/CR  -Ativan 2mg PO/IM/IV Q6H PRN -agitation, monitor for sedation, hold if RR<12

## 2020-05-30 NOTE — PROGRESS NOTE BEHAVIORAL HEALTH - NSBHCONSULTRECOMMENDOTHER_PSY_A_CORE FT
f/u Drea Bailey NP @ [facility] [phone](9a-7p)

## 2020-05-30 NOTE — PROGRESS NOTE ADULT - SUBJECTIVE AND OBJECTIVE BOX
Patient is a 25y old  Female who presents with a chief complaint of CC: Stuttering (30 May 2020 07:52)      SUBJECTIVE / OVERNIGHT EVENTS:    Patient seen and examined at bedside. No acute events overnight.    T(F): 98.3 ( @ 06:13), Max: 98.6 ( @ 09:30)  HR: 93 ( @ 06:13) (85 - 96)  BP: 91/59 ( @ 06:13) (82/50 - 93/61)  RR: 17 ( @ 06:13) (14 - 17)  SpO2: 99% ( @ 06:13) (98% - 100%)    I&O's Summary      MEDICATIONS  (STANDING):  cholecalciferol 2000 Unit(s) Oral daily  cloZAPine 300 milliGRAM(s) Oral at bedtime  cloZAPine 25 milliGRAM(s) Oral daily  fluvoxaMINE 50 milliGRAM(s) Oral at bedtime  lithium 300 milliGRAM(s) Oral daily  lithium 600 milliGRAM(s) Oral at bedtime  LORazepam     Tablet 2 milliGRAM(s) Oral at bedtime  senna 2 Tablet(s) Oral at bedtime  traZODone 200 milliGRAM(s) Oral at bedtime  zonisamide 100 milliGRAM(s) Oral at bedtime    MEDICATIONS  (PRN):  LORazepam     Tablet 2 milliGRAM(s) Oral every 6 hours PRN Agitation  ondansetron Injectable 4 milliGRAM(s) IV Push three times a day PRN Nausea and/or Vomiting  oxyCODONE    IR 5 milliGRAM(s) Oral every 4 hours PRN Moderate Pain (4 - 6)  oxyCODONE    IR 10 milliGRAM(s) Oral every 4 hours PRN Severe Pain (7 - 10)      PHYSICAL EXAM:   GEN: Age appropriate, resting comfortably in bed, no acute distress, non toxic appearing, speaking in complete sentences.   HEENT: Conjunctiva and sclera normal  PULM: Lungs CTAB, no wheezes, rales, rhonchi  CV: RRR, S1S2, no MRG  MSK: no stiffness or joint effusions  Abdominal: Soft, nontender to palpation, non-distended, +BS. Suprapubic tenderness on exam. No CVA tenderness  Extremities: No edema or cyanosis  NEURO: AAOx3  Psych: normal affect, normal behavior  Skin: No rashes, lesions    LABS:  Labs personally reviewed.                        11.4   11.18 )-----------( 346      ( 30 May 2020 06:30 )             32.8     Hgb Trend: 11.4<--, 9.9<--, 11.9<--, 11.3<--  05-30    139  |  110<H>  |  5<L>  ----------------------------<  84  3.9   |  19<L>  |  0.67    Ca    9.1      30 May 2020 06:30  Phos  2.4     05-30  Mg     2.5     30    TPro  6.5  /  Alb  3.4  /  TBili  < 0.2<L>  /  DBili  x   /  AST  17  /  ALT  17  /  AlkPhos  67  05-29    Creatinine Trend: 0.67<--, 0.71<--, 0.92<--, 0.57<--, 0.64<--, 0.85<--    Urinalysis Basic - ( 28 May 2020 16:15 )    Color: YELLOW / Appearance: Lt TURBID / S.023 / pH: 7.0  Gluc: NEGATIVE / Ketone: NEGATIVE  / Bili: NEGATIVE / Urobili: TRACE   Blood: NEGATIVE / Protein: 200 / Nitrite: NEGATIVE   Leuk Esterase: TRACE / RBC: x / WBC 10-20   Sq Epi: x / Non Sq Epi: FEW / Bacteria: SMALL          [facility]  PGY-3 Pager: [phone]  [phone]  Night Float:

## 2020-05-30 NOTE — PROGRESS NOTE BEHAVIORAL HEALTH - SUMMARY
24 yo , single, female residing with roommate with PMHx urinary incontinence, PSHx cholecystectomy, as per chart, PPHx BPD, MDD, dysthymic disorder, bipolar disorder with psychotic features, SIB presents to [facility] for c/o forgetfulness, twitching/shaking, eye droop and abnormal gait for last month. [facility] psychiatry consulted for medication management.    Today's exam appears to be an acute change from yesterday where she was alert and attentive.  Would ensure full delirium workup is being provided given finding both in urine culture, CT Abdomen and EEG.  Psych CL discontinued wellbutrin and adderall yesterday as can lower seizure threshold.  Today's lithium level is therepeutic.  In the setting of concern for possible epileptiform activity would recommend the following: (will lower clozapine, will stop trazodone, will ask for another lithium level tomorrow, address medical/neurologic issues)    PLAN:  - delirium workup per medical team- address concerning EEG, address UTI, address CT adbomen  -d/c Wellbutrin-can lower seizure threshold   -d/c Adderall- due to current concerns of seizures .  -DECREASE Luvox to 50mg HS for 3 days then d/c on Monday, June 1 (it was started a month back, pt. noticed stuttering since then)   [] DISCONTINUE daily CLOZAPINE 25MG  [] LOWER CLOZAPINE EVENING DOSE TO 150MG HS       - if patient's mental status returns and no concern for seizures would increase dose- psych CL tomorrow will address         - AND MONITOR WBC's, ANCs, monitor orthostatics  [] HOLD trazodone 200mg HS, hold if qtc >500  [] c/w Ativan 2mg HS-monitor for sedation, hold if RR<12  [] c/w Lithium 300mg daily, 600mg HS-monitor BUN/CR        [] please check lithium level tomorrow AM prior to morning dose  [] address TSH elevation per primaryt team  -Ativan 2mg PO/IM/IV Q6H PRN -agitation, monitor for sedation, hold if RR<12  -Monitor EKG qtc interval  -Obtain B-HCG  [facility] psychiatry to follow patient.

## 2020-05-30 NOTE — PROGRESS NOTE BEHAVIORAL HEALTH - NSBHCHARTREVIEWLAB_PSY_A_CORE FT
11.4   11.18 )-----------( 346      ( 30 May 2020 06:30 )             32.8   05-30    139  |  110<H>  |  5<L>  ----------------------------<  84  3.9   |  19<L>  |  0.67    Ca    9.1      30 May 2020 06:30  Phos  2.4     05-30  Mg     2.5     05-30    TPro  6.5  /  Alb  3.4  /  TBili  < 0.2<L>  /  DBili  x   /  AST  17  /  ALT  17  /  AlkPhos  67  05-29

## 2020-05-31 LAB
ANION GAP SERPL CALC-SCNC: 12 MMO/L — SIGNIFICANT CHANGE UP (ref 7–14)
BASOPHILS # BLD AUTO: 0.03 K/UL — SIGNIFICANT CHANGE UP (ref 0–0.2)
BASOPHILS NFR BLD AUTO: 0.3 % — SIGNIFICANT CHANGE UP (ref 0–2)
BUN SERPL-MCNC: 9 MG/DL — SIGNIFICANT CHANGE UP (ref 7–23)
CALCIUM SERPL-MCNC: 9.4 MG/DL — SIGNIFICANT CHANGE UP (ref 8.4–10.5)
CHLORIDE SERPL-SCNC: 103 MMOL/L — SIGNIFICANT CHANGE UP (ref 98–107)
CO2 SERPL-SCNC: 20 MMOL/L — LOW (ref 22–31)
CREAT SERPL-MCNC: 0.72 MG/DL — SIGNIFICANT CHANGE UP (ref 0.5–1.3)
EOSINOPHIL # BLD AUTO: 0.31 K/UL — SIGNIFICANT CHANGE UP (ref 0–0.5)
EOSINOPHIL NFR BLD AUTO: 3 % — SIGNIFICANT CHANGE UP (ref 0–6)
GLUCOSE SERPL-MCNC: 88 MG/DL — SIGNIFICANT CHANGE UP (ref 70–99)
HCT VFR BLD CALC: 32.8 % — LOW (ref 34.5–45)
HGB BLD-MCNC: 11.3 G/DL — LOW (ref 11.5–15.5)
IMM GRANULOCYTES NFR BLD AUTO: 0.6 % — SIGNIFICANT CHANGE UP (ref 0–1.5)
LITHIUM SERPL-MCNC: 0.89 MMOL/L — SIGNIFICANT CHANGE UP (ref 0.6–1.2)
LYMPHOCYTES # BLD AUTO: 2.67 K/UL — SIGNIFICANT CHANGE UP (ref 1–3.3)
LYMPHOCYTES # BLD AUTO: 25.4 % — SIGNIFICANT CHANGE UP (ref 13–44)
MAGNESIUM SERPL-MCNC: 2.3 MG/DL — SIGNIFICANT CHANGE UP (ref 1.6–2.6)
MCHC RBC-ENTMCNC: 31.6 PG — SIGNIFICANT CHANGE UP (ref 27–34)
MCHC RBC-ENTMCNC: 34.5 % — SIGNIFICANT CHANGE UP (ref 32–36)
MCV RBC AUTO: 91.6 FL — SIGNIFICANT CHANGE UP (ref 80–100)
MONOCYTES # BLD AUTO: 0.69 K/UL — SIGNIFICANT CHANGE UP (ref 0–0.9)
MONOCYTES NFR BLD AUTO: 6.6 % — SIGNIFICANT CHANGE UP (ref 2–14)
NEUTROPHILS # BLD AUTO: 6.74 K/UL — SIGNIFICANT CHANGE UP (ref 1.8–7.4)
NEUTROPHILS NFR BLD AUTO: 64.1 % — SIGNIFICANT CHANGE UP (ref 43–77)
NRBC # FLD: 0 K/UL — SIGNIFICANT CHANGE UP (ref 0–0)
PHOSPHATE SERPL-MCNC: 3.8 MG/DL — SIGNIFICANT CHANGE UP (ref 2.5–4.5)
PLATELET # BLD AUTO: 370 K/UL — SIGNIFICANT CHANGE UP (ref 150–400)
PMV BLD: 10.2 FL — SIGNIFICANT CHANGE UP (ref 7–13)
POTASSIUM SERPL-MCNC: 3.6 MMOL/L — SIGNIFICANT CHANGE UP (ref 3.5–5.3)
POTASSIUM SERPL-SCNC: 3.6 MMOL/L — SIGNIFICANT CHANGE UP (ref 3.5–5.3)
RBC # BLD: 3.58 M/UL — LOW (ref 3.8–5.2)
RBC # FLD: 13 % — SIGNIFICANT CHANGE UP (ref 10.3–14.5)
SODIUM SERPL-SCNC: 135 MMOL/L — SIGNIFICANT CHANGE UP (ref 135–145)
WBC # BLD: 10.5 K/UL — SIGNIFICANT CHANGE UP (ref 3.8–10.5)
WBC # FLD AUTO: 10.5 K/UL — SIGNIFICANT CHANGE UP (ref 3.8–10.5)

## 2020-05-31 PROCEDURE — 99232 SBSQ HOSP IP/OBS MODERATE 35: CPT

## 2020-05-31 PROCEDURE — 99233 SBSQ HOSP IP/OBS HIGH 50: CPT | Mod: GC

## 2020-05-31 PROCEDURE — 99231 SBSQ HOSP IP/OBS SF/LOW 25: CPT

## 2020-05-31 RX ADMIN — Medication 1 TABLET(S): at 17:53

## 2020-05-31 RX ADMIN — Medication 2000 UNIT(S): at 12:54

## 2020-05-31 RX ADMIN — SENNA PLUS 2 TABLET(S): 8.6 TABLET ORAL at 21:12

## 2020-05-31 RX ADMIN — LITHIUM CARBONATE 300 MILLIGRAM(S): 300 TABLET, EXTENDED RELEASE ORAL at 12:54

## 2020-05-31 RX ADMIN — FLUVOXAMINE MALEATE 50 MILLIGRAM(S): 25 TABLET ORAL at 21:12

## 2020-05-31 RX ADMIN — Medication 200 MILLIGRAM(S): at 21:11

## 2020-05-31 RX ADMIN — LITHIUM CARBONATE 600 MILLIGRAM(S): 300 TABLET, EXTENDED RELEASE ORAL at 21:12

## 2020-05-31 RX ADMIN — Medication 1 TABLET(S): at 12:54

## 2020-05-31 RX ADMIN — CEFTRIAXONE 100 MILLIGRAM(S): 500 INJECTION, POWDER, FOR SOLUTION INTRAMUSCULAR; INTRAVENOUS at 10:44

## 2020-05-31 NOTE — PROGRESS NOTE ADULT - PROBLEM SELECTOR PLAN 2
Etiology likely post-op pain considering pt recently had cholecystectomy. Imaging with concern for gallbladder fossa fluid collection with concern for hematoma and/or abscess.  Lab tests with improving WBC and normal liver labs.  Exam with minimal abdominal tenderness and tmax of only 100.1 and baseline soft BP.  -Low suspicion for infection despite CT findings. Surgery in agreement with no abx.  -S/p zosyn and macrobid in the ED but will monitor off abx  -F/u cultures  -Monitor abdominal exam  -If vital sign instability or worsened abdominal pain, nausea or vomiting, can restart zosyn for intraabdominal infection coverage  -On review of prior admission records, baseline blood pressure appears to be around 90-100s/50-60s.  -F/u COVID PCR -UCx growing GPC in pairs and chains. Concern for GBS UTI vs contaminant  -C/w CTX for 5d   -Await speciation of UCx

## 2020-05-31 NOTE — PROGRESS NOTE BEHAVIORAL HEALTH - NSBHFUPINTERVALHXFT_PSY_A_CORE
Pt lethargic this morning. Did not receive PRNs overnight. Opened her eyes briefly but otherwise could not stay awake/participate in assessment.

## 2020-05-31 NOTE — PROGRESS NOTE ADULT - ATTENDING COMMENTS
patient seen and examined at  bedside. more sleepy c/w yesterday. Arousable and answers appropriately, exam otherwise non focal.  VEEG today  awaiting MRI brain with and without contrast, sz protocol  zonisamide 200mg qhs  frequent neuro checks

## 2020-05-31 NOTE — PROGRESS NOTE ADULT - PROBLEM SELECTOR PLAN 1
Patient's signs and symptoms may be explained by polypharmacy and her recent cholecystectomy  -Psych consult for med management  -Continue home meds in the meantime  -TSH elevated likely 2/2 meds, will follow and obtain T3/T4  -I-stop in chart 5/29  -F/u Utox, serum tox Patient's signs and symptoms may be explained by polypharmacy and her recent cholecystectomy  -F/u psych given somnolence this AM. Etiology likely recent med changes specifically d/c of adderall. Will have NF assess pt tonight and if awake/alert will give PM ativan, otherwise will hold  -I-stop in chart 5/29

## 2020-05-31 NOTE — PROGRESS NOTE ADULT - PROBLEM SELECTOR PLAN 4
History obtained by neuro significant for possible absence seizures  -Spot EEG showing areas of epileptiform activity however no seizures   -Neuro rec inpatient 24 EEG if admitted or outpt EEG if to be dcd  -24 hour vEEG ordered  -C/w zonisamide 100 mg qhs. increase by 100mg every 7 days. goal of 300mg qhs  -hold Welbutrin and Adderall. WIll consider DCing trazadone   -may benefit from Lamictal. would have to be titrated up slowly as outpatient. Etiology likely post-op pain considering pt recently had cholecystectomy. Imaging with concern for gallbladder fossa fluid collection with concern for hematoma and/or abscess.  Lab tests with improving WBC and normal liver labs.  Exam with minimal abdominal tenderness and tmax of only 100.1 and baseline soft BP.  -Low suspicion for infection despite CT findings. Surgery in agreement with no abx.  -S/p zosyn and macrobid in the ED, now on CTX   -F/u cultures  -Monitor abdominal exam  -If vital sign instability or worsened abdominal pain, nausea or vomiting, can restart zosyn for intraabdominal infection coverage  -On review of prior admission records, baseline blood pressure appears to be around 90-100s/50-60s.  -COVID neg

## 2020-05-31 NOTE — PROVIDER CONTACT NOTE (OTHER) - SITUATION
AOx2. Disoriented to place and time. When asked if she knew where she was, she stated, "Zero zero." No s/s of acute distress. Vitals stable. Neuro check WDL except pupils not reacting to light.

## 2020-05-31 NOTE — PROGRESS NOTE ADULT - SUBJECTIVE AND OBJECTIVE BOX
SUBJECTIVE: patient seen and examined at bedside.       MEDICATIONS (HOME):  Home Medications:  acetaminophen 325 mg oral tablet: 2 tab(s) orally every 6 hours, As needed, Mild Pain (1 - 3) (29 May 2020 02:24)  Amitiza 24 mcg oral capsule: 1 cap(s) orally 2 times a day (29 May 2020 02:24)  amphetamine salt: 10 milligram(s) orally once a day (29 May 2020 02:24)  buPROPion 75 mg oral tablet: 1 tab(s) orally once a day (29 May 2020 02:24)  cholecalciferol oral tablet: 2000 unit(s) orally once a day (29 May 2020 02:24)  cloZAPine 100 mg oral tablet: 3 tab(s) orally once a day (at bedtime) (29 May 2020 02:24)  cloZAPine 25 mg oral tablet: 1 tab(s) orally once a day (29 May 2020 02:24)  dextroamphetamine-amphetamine 5 mg oral tablet: 1 tab(s) orally once a day (29 May 2020 02:24)  fluvoxaMINE: 100 milligram(s) orally once a day (at bedtime) (29 May 2020 02:24)  lithium 300 mg oral tablet, extended release: 1 tab(s) orally once a day (29 May 2020 02:24)  lithium 300 mg oral tablet, extended release: 2 tab(s) orally once a day (at bedtime) (29 May 2020 02:24)  LORazepam 2 mg oral tablet: 1 tab(s) orally once a day (at bedtime) (29 May 2020 02:24)  melatonin 3 mg oral tablet: 2 tab(s) orally , As needed, Insomnia (29 May 2020 02:24)  mybetriq: 50 milligram(s) orally once a day (29 May 2020 02:24)  notrel: 1 tab(s) orally once a day (29 May 2020 02:)  traZODone 100 mg oral tablet: 2 tab(s) orally once a day (at bedtime) (29 May 2020 02:24)    MEDICATIONS  (STANDING):  cholecalciferol 2000 Unit(s) Oral daily  cloZAPine 300 milliGRAM(s) Oral at bedtime  cloZAPine 25 milliGRAM(s) Oral daily  fluvoxaMINE 50 milliGRAM(s) Oral at bedtime  lithium 300 milliGRAM(s) Oral daily  lithium 600 milliGRAM(s) Oral at bedtime  LORazepam     Tablet 2 milliGRAM(s) Oral at bedtime  senna 2 Tablet(s) Oral at bedtime  traZODone 200 milliGRAM(s) Oral at bedtime  zonisamide 100 milliGRAM(s) Oral at bedtime    MEDICATIONS  (PRN):  LORazepam     Tablet 2 milliGRAM(s) Oral every 6 hours PRN Agitation  ondansetron Injectable 4 milliGRAM(s) IV Push three times a day PRN Nausea and/or Vomiting  oxyCODONE    IR 5 milliGRAM(s) Oral every 4 hours PRN Moderate Pain (4 - 6)  oxyCODONE    IR 10 milliGRAM(s) Oral every 4 hours PRN Severe Pain (7 - 10)    ALLERGIES/INTOLERANCES:  Allergies  No Known Allergies    VITALS & EXAMINATION:  Vital Signs Last 24 Hrs  T(C): 36.6 (31 May 2020 05:16), Max: 37.1 (30 May 2020 13:45)  T(F): 97.8 (31 May 2020 05:16), Max: 98.7 (30 May 2020 13:45)  HR: 81 (31 May 2020 05:16) (71 - 90)  BP: 95/58 (31 May 2020 05:16) (84/54 - 95/58)  BP(mean): --  RR: 15 (31 May 2020 05:16) (15 - 18)  SpO2: 99% (31 May 2020 05:16) (97% - 100%)      Neurological (>12):  MS: Awake, alert, oriented to person, place, situation, time. Normal affect. Follows all commands.    Language: Speech is clear, fluent with good repetition & comprehension     CNs: EOMI, VFF. well developed masseter muscles b/l. No facial asymmetry b/l. Symmetric palate elevation in midline. Gag reflex deferred. Tongue midline, normal movements, no atrophy.    Motor: Normal muscle bulk & tone. No noticeable tremor or seizure.  Able to lift BUE > 10 secs w/o drift  Able to lift BLE > 5 secs w/o drift    Coordination: Bilateral FTN and RACHID intact    LABORATORY:  CBC                       9.9    11.69 )-----------( 358      ( 29 May 2020 06:10 )             27.8     Chem 05-29    139  |  109<H>  |  7   ----------------------------<  115<H>  3.8   |  18<L>  |  0.71    Ca    8.2<L>      29 May 2020 06:10  Phos  2.6       Mg     2.2         TPro  6.5  /  Alb  3.4  /  TBili  < 0.2<L>  /  DBili  x   /  AST  17  /  ALT  17  /  AlkPhos  67      LFTs LIVER FUNCTIONS - ( 29 May 2020 06:10 )  Alb: 3.4 g/dL / Pro: 6.5 g/dL / ALK PHOS: 67 u/L / ALT: 17 u/L / AST: 17 u/L / GGT: x           U/A Urinalysis Basic - ( 28 May 2020 16:15 )    Color: YELLOW / Appearance: Lt TURBID / S.023 / pH: 7.0  Gluc: NEGATIVE / Ketone: NEGATIVE  / Bili: NEGATIVE / Urobili: TRACE   Blood: NEGATIVE / Protein: 200 / Nitrite: NEGATIVE   Leuk Esterase: TRACE / RBC: x / WBC 10-20   Sq Epi: x / Non Sq Epi: FEW / Bacteria: SMALL    STUDIES & IMAGING:  Studies (EKG, EEG, EMG, etc):     Radiology (XR, CT, MR, U/S, TTE/JERRI):    < from: CT Angio Head w/ IV Cont (20 @ 17:57) >  IMPRESSION:     CT HEAD: No acute intracranial bleeding, mass effect, or shift.     Volume loss, greater than expected for patient's age.    CTA BRAIN: Patent intracranial circulation. No flow-limiting stenosis or occlusion.     CTA NECK: Patent cervical vasculature. No flow limiting stenosis or occlusion.     < end of copied text >    Abnormal EEG study.  1. Presence of both focal and generalized epileptiform discharges, which suggest either focal and generalized epilepsy, focal epilepsy with rapid bilateral synchrony, or generalized epilepsy with fragmented discharges. Correlate clinically.  2. Mild to moderate nonspecific diffuse or multifocal cerebral dysfunction.   3. No seizure seen. SUBJECTIVE: Patient seen and examined at bedside on the morning of 20. Patient notably lethargic this AM.      MEDICATIONS (HOME):  Home Medications:  acetaminophen 325 mg oral tablet: 2 tab(s) orally every 6 hours, As needed, Mild Pain (1 - 3) (29 May 2020 02:24)  Amitiza 24 mcg oral capsule: 1 cap(s) orally 2 times a day (29 May 2020 02:)  amphetamine salt: 10 milligram(s) orally once a day (29 May 2020 02:)  buPROPion 75 mg oral tablet: 1 tab(s) orally once a day (29 May 2020 02:24)  cholecalciferol oral tablet: 2000 unit(s) orally once a day (29 May 2020 02:)  cloZAPine 100 mg oral tablet: 3 tab(s) orally once a day (at bedtime) (29 May 2020 02:24)  cloZAPine 25 mg oral tablet: 1 tab(s) orally once a day (29 May 2020 02:24)  dextroamphetamine-amphetamine 5 mg oral tablet: 1 tab(s) orally once a day (29 May 2020 02:)  fluvoxaMINE: 100 milligram(s) orally once a day (at bedtime) (29 May 2020 02:24)  lithium 300 mg oral tablet, extended release: 1 tab(s) orally once a day (29 May 2020 02:24)  lithium 300 mg oral tablet, extended release: 2 tab(s) orally once a day (at bedtime) (29 May 2020 02:)  LORazepam 2 mg oral tablet: 1 tab(s) orally once a day (at bedtime) (29 May 2020 02:)  melatonin 3 mg oral tablet: 2 tab(s) orally , As needed, Insomnia (29 May 2020 02:24)  mybetriq: 50 milligram(s) orally once a day (29 May 2020 02:)  notrel: 1 tab(s) orally once a day (29 May 2020 02:24)  traZODone 100 mg oral tablet: 2 tab(s) orally once a day (at bedtime) (29 May 2020 02:24)    MEDICATIONS  (STANDING):  cholecalciferol 2000 Unit(s) Oral daily  cloZAPine 300 milliGRAM(s) Oral at bedtime  cloZAPine 25 milliGRAM(s) Oral daily  fluvoxaMINE 50 milliGRAM(s) Oral at bedtime  lithium 300 milliGRAM(s) Oral daily  lithium 600 milliGRAM(s) Oral at bedtime  LORazepam     Tablet 2 milliGRAM(s) Oral at bedtime  senna 2 Tablet(s) Oral at bedtime  traZODone 200 milliGRAM(s) Oral at bedtime  zonisamide 100 milliGRAM(s) Oral at bedtime    MEDICATIONS  (PRN):  LORazepam     Tablet 2 milliGRAM(s) Oral every 6 hours PRN Agitation  ondansetron Injectable 4 milliGRAM(s) IV Push three times a day PRN Nausea and/or Vomiting  oxyCODONE    IR 5 milliGRAM(s) Oral every 4 hours PRN Moderate Pain (4 - 6)  oxyCODONE    IR 10 milliGRAM(s) Oral every 4 hours PRN Severe Pain (7 - 10)    ALLERGIES/INTOLERANCES:  Allergies  No Known Allergies    VITALS & EXAMINATION:  Vital Signs Last 24 Hrs  T(C): 36.6 (31 May 2020 05:16), Max: 37.1 (30 May 2020 13:45)  T(F): 97.8 (31 May 2020 05:16), Max: 98.7 (30 May 2020 13:45)  HR: 81 (31 May 2020 05:16) (71 - 90)  BP: 95/58 (31 May 2020 05:16) (84/54 - 95/58)  BP(mean): --  RR: 15 (31 May 2020 05:16) (15 - 18)  SpO2: 99% (31 May 2020 05:16) (97% - 100%)      Neurological (>12):  MS: Eyes closed, awakens to verbal stimuli. Oriented to at least person    Language: Speech is clear, fluent with comprehension     CNs: EOMI, VFF. well developed masseter muscles b/l. No facial asymmetry b/l. Symmetric palate elevation in midline. Gag reflex deferred. Tongue midline, normal movements, no atrophy.    Motor: Normal muscle bulk & tone. No noticeable tremor or seizure.        LABORATORY:  CBC                       9.9    11.69 )-----------( 358      ( 29 May 2020 06:10 )             27.8     Chem 05-29    139  |  109<H>  |  7   ----------------------------<  115<H>  3.8   |  18<L>  |  0.71    Ca    8.2<L>      29 May 2020 06:10  Phos  2.6       Mg     2.2         TPro  6.5  /  Alb  3.4  /  TBili  < 0.2<L>  /  DBili  x   /  AST  17  /  ALT  17  /  AlkPhos  67  -    LFTs LIVER FUNCTIONS - ( 29 May 2020 06:10 )  Alb: 3.4 g/dL / Pro: 6.5 g/dL / ALK PHOS: 67 u/L / ALT: 17 u/L / AST: 17 u/L / GGT: x           U/A Urinalysis Basic - ( 28 May 2020 16:15 )    Color: YELLOW / Appearance: Lt TURBID / S.023 / pH: 7.0  Gluc: NEGATIVE / Ketone: NEGATIVE  / Bili: NEGATIVE / Urobili: TRACE   Blood: NEGATIVE / Protein: 200 / Nitrite: NEGATIVE   Leuk Esterase: TRACE / RBC: x / WBC 10-20   Sq Epi: x / Non Sq Epi: FEW / Bacteria: SMALL    STUDIES & IMAGING:  Studies (EKG, EEG, EMG, etc):     Radiology (XR, CT, MR, U/S, TTE/JERRI):    < from: CT Angio Head w/ IV Cont (20 @ 17:57) >  IMPRESSION:     CT HEAD: No acute intracranial bleeding, mass effect, or shift.     Volume loss, greater than expected for patient's age.    CTA BRAIN: Patent intracranial circulation. No flow-limiting stenosis or occlusion.     CTA NECK: Patent cervical vasculature. No flow limiting stenosis or occlusion.     < end of copied text >    Abnormal EEG study.  1. Presence of both focal and generalized epileptiform discharges, which suggest either focal and generalized epilepsy, focal epilepsy with rapid bilateral synchrony, or generalized epilepsy with fragmented discharges. Correlate clinically.  2. Mild to moderate nonspecific diffuse or multifocal cerebral dysfunction.   3. No seizure seen.

## 2020-05-31 NOTE — PROGRESS NOTE ADULT - PROBLEM SELECTOR PLAN 3
-UCx growing GPC in pairs and chains. Concern for GBS UTI vs contaminant  -Acute urinary retention this AM 600mL on straight cath. Hx of urin incont.   -Start CTX 1g daily for 5 day course (received 1 full day already)  -Await speciation of UCx History obtained by neuro significant for possible absence seizures  -Spot EEG showing areas of epileptiform activity however no seizures   -Neuro rec inpatient 24 EEG if admitted or outpt EEG if to be dcd  -24 hour vEEG ordered  -C/w zonisamide 200 mg qhs. increase by 100mg every 7 days. goal of 300mg qhs  -hold Welbutrin and Adderall, additional med changes per psych  -may benefit from Lamictal. would have to be titrated up slowly as outpatient.

## 2020-05-31 NOTE — PROGRESS NOTE ADULT - ASSESSMENT
25y Female with PMHx of bipolar, MDD, borderline personality disorder, urinary incontinence, s/p cholecystectomy here w/ stuttering, forgetfulness, abnormal gait for 1 month. CT head w/ volume loss, CTA H/N with no LVO. Sx Likely 2/2 medication side effects in possibly in conjunction with toxic/metabolic dysfunction, although the "spacing out" events occurring in the setting of both antipsychotic use (clozaril) as well as wellbutrin does raise concern for possible subclinical seizure, although suspicion somewhat low in my judgment. At this time, would recommend patient not drive as well as follow up with a neurologist Discussed not driving with patient until cleared by outpatient neurologist.     Impression:  possible generalized epilepsy    Recs:    B12, TSH, folate, MMA and homocysteine and copper levels   vEEG - to be hooked up 5/31 - EEG tech aware (please call [phone] 5/31 AM)  Zonisamide  200mg qHS  MRI brain with and without contrast Sz protocol    Follow up with Dr. Michael Nissenbaum([phone]) outpatient    No driving, operating heavy machinery, water sports/bathing, activity in elevation without appropriate safety precautions    mgmt d/w Neurology Attending Dr. Franco 25y Female with PMHx of bipolar, MDD, borderline personality disorder, urinary incontinence, s/p cholecystectomy here w/ stuttering, forgetfulness, abnormal gait for 1 month. CT head w/ volume loss, CTA H/N with no LVO. Sx Likely 2/2 medication side effects in possibly in conjunction with toxic/metabolic dysfunction, although the "spacing out" events occurring in the setting of both antipsychotic use (clozaril) as well as wellbutrin does raise concern for possible subclinical seizure, although suspicion somewhat low in my judgment. At this time, would recommend patient not drive as well as follow up with a neurologist Discussed not driving with patient until cleared by outpatient neurologist.     Impression:  Concern for Generalized Epilepsy    Recs:    vEEG - to be hooked up 5/31 - EEG tech aware   MRI brain with and without contrast Sz protocol  B12, TSH, folate, MMA and homocysteine and copper levels   Zonisamide  200mg qHS  Seizure Precautions  Neuro checks q4h    Follow up with Dr. Michael Nissenbaum([phone]) outpatient    No driving, operating heavy machinery, water sports/bathing, activity in elevation without appropriate safety precautions    mgmt d/w Neurology Attending Dr. Franco

## 2020-05-31 NOTE — PROGRESS NOTE ADULT - ATTENDING COMMENTS
A 25 year old woman with a hx of bipolar disorder, borderline personality disorder, urinary incontinence, and recent cholecystectomy for acute cholecystitis who presents for stuttering, forgetfulness and abnormal gait and had abdominal pain in the ED with CT findings concerning for GB fossa fluid collection. Patient seen and evaluated at bedside. More lethargic this AM, but arousable- able to state her name and , moving all extremities in bed with meaningful intention.     Abdominal Pain: Present on admission, now resolving/improving.   - Afebrile, hemodynamically stable, with resolving leukocytosis.   - CT: GB Fossa Fluid Collection in the setting of recent Cholecystectomy.   - Appreciate Sx- likely post-operative changes. No acute surgical intervention indicated.  - GB Folla flui collection not likely infectious per Surg, hold of on AB for intraabd infection.  - UA+cultures E. Coli + >100,000, blood cultures negative. Adjust antibiotics per cultures.  - COVID negative.      Stuttering, Forgetfulness, Abnormal Gait: In the setting of ?Absence Seizures.   - CT Angio Head/ Neck negative for acute pathology.   - Spot EEG showing areas of epileptiform activity however no seizures   - Neuro rec inpatient 24 EEG if admitted or outpt EEG if to be dcd.   - Follow up 24 hour VEEG ordered for  (Per neuro).   - Follow up Brain MRI w/ contrast, w/o contrast.   - C/w Zonisamide 200 mg qhs. Increase by 100mg every 7 days. Goal of 300mg qhs  - HELD Welbutrin and Adderall, additional med changes per psych.   - May benefit from Lamictal- to be titrated up slowly as outpatient.  - Seizure precautions, Aspiration precautions.     Bipolar Disease: History of w/ concern for Polypharmacy.   - Behavioral Health consulted, recommendations appreciated.   - D/c Wellbutrin and Adderall due to current concerns of seizures  - DECREASED Luvox to 50mg HS for 3 days then d/c on Monday,    - c/w Clozapine 25mg daily, 300mg HS-monitor WBC's, ANCs, monitor orthostatics  - c/w Trazodone 200mg HS, hold if qtc >500. Consider d/c as decreases seizure threshold  - c/w Ativan 2mg HS-monitor for sedation, hold if RR<12  - c/w Lithium 300mg daily, 600mg HS-monitor BUN/CR  - QTC monitoring, WBC, Renal function monitoring.     Activity as tolerated.   Diet: as tolerated  Dispo: likely home when acute issues resolve, 1-2 days. A 25 year old woman with a hx of bipolar disorder, borderline personality disorder, urinary incontinence, and recent cholecystectomy for acute cholecystitis who presents for stuttering, forgetfulness and abnormal gait and had abdominal pain in the ED with CT findings concerning for GB fossa fluid collection. Patient seen and evaluated at bedside. More somnolent this AM, but arousable- able to state her name and , moving all extremities in bed with meaningful intention.     Abdominal Pain: Present on admission, now resolving/improving.   - Afebrile, hemodynamically stable, with resolving leukocytosis.   - CT: GB Fossa Fluid Collection in the setting of recent Cholecystectomy.   - Appreciate Sx- likely post-operative changes. No acute surgical intervention indicated.  - GB Folla flui collection not likely infectious per Surg, hold of on AB for intraabd infection.  - UA+cultures E. Coli + >100,000, blood cultures negative. Adjust antibiotics per cultures.  - COVID negative.      Stuttering, Forgetfulness, Abnormal Gait: In the setting of ?Absence Seizures.   - CT Angio Head/ Neck negative for acute pathology.   - Spot EEG showing areas of epileptiform activity however no seizures   - Neuro rec inpatient 24 EEG if admitted or outpt EEG if to be dcd.   - Follow up 24 hour VEEG ordered for  (Per neuro).   - Follow up Brain MRI w/ contrast, w/o contrast.   - C/w Zonisamide 200 mg qhs. Increase by 100mg every 7 days. Goal of 300mg qhs  - HELD Welbutrin and Adderall, additional med changes per psych.   - May benefit from Lamictal- to be titrated up slowly as outpatient.  - Seizure precautions, Aspiration precautions.     Bipolar Disease: History of w/ concern for Polypharmacy.   - Behavioral Health consulted, recommendations appreciated.   - D/c Wellbutrin and Adderall due to current concerns of seizures  - DECREASED Luvox to 50mg HS for 3 days then d/c on Monday,    - c/w Clozapine 25mg daily, 300mg HS-monitor WBC's, ANCs, monitor orthostatics  - c/w Trazodone 200mg HS, hold if qtc >500. Consider d/c as decreases seizure threshold  - c/w Ativan 2mg HS-monitor for sedation, hold if RR<12  - c/w Lithium 300mg daily, 600mg HS-monitor BUN/CR. Lithium level wnl.   - QTC monitoring, WBC, Renal function monitoring.   - Utox negative, Ethanol, Salicylate, and Tylenol level wnl.     Activity as tolerated.   Diet: as tolerated  Dispo: likely home when acute issues resolve, 1-2 days.

## 2020-05-31 NOTE — PROGRESS NOTE BEHAVIORAL HEALTH - CASE SUMMARY
24 yo , single, female residing with roommate with PMHx urinary incontinence, PSHx cholecystectomy, as per chart, PPHx BPD, MDD, dysthymic disorder, bipolar disorder with psychotic features, SIB presents to [facility] for c/o forgetfulness, twitching/shaking, eye droop and abnormal gait for last month. [facility] psychiatry consulted for medication management.     Today, pt appeared lethargic, responded briefly, will continue plan as noted.

## 2020-05-31 NOTE — PROGRESS NOTE BEHAVIORAL HEALTH - SUMMARY
26 yo , single, female residing with roommate with PMHx urinary incontinence, PSHx cholecystectomy, as per chart, PPHx BPD, MDD, dysthymic disorder, bipolar disorder with psychotic features, SIB presents to [facility] for c/o forgetfulness, twitching/shaking, eye droop and abnormal gait for last month. [facility] psychiatry consulted for medication management.    Today's exam appears to be an acute change from yesterday where she was alert and attentive.  Would ensure full delirium workup is being provided given finding both in urine culture, CT Abdomen and EEG.  Psych CL discontinued wellbutrin and adderall yesterday as can lower seizure threshold.    5/30 - Today's lithium level is therepeutic.  In the setting of concern for possible epileptiform activity would recommend the following: (will lower clozapine, will stop trazodone, will ask for another lithium level tomorrow, address medical/neurologic issues)    5/31 - pt lethargic, unable to engage in assessment, likely multifactorial (discontinuation of stimulating agents (adderall/wellbutrin), UTI/delirium). Lithium level within therapeutic window this AM. See recs below     PLAN:  -Delirium workup per medical team  -Continue to hold adderall/wellbutrin as these may lower seizure threshold  -Continue to hold trazodone 200mg qhs  -Continue Luvox 50mg qhs tonight, d/c on Monday 6/1  -Continue Clozapine 150mg qhs - pt's 25mg AM dose discontinued due to concern for seizures. If mental status improves and no concern for seizures, would increase the dose again.   -Monitor ANC, orthostatics   -c/w Ativan 2mg HS-monitor for sedation, hold if RR<12  -c/w Lithium 300mg daily, 600mg HS-monitor BUN/CR - level on 5/31 0.89  -Ativan 2mg PO/IM/IV Q6H PRN -agitation, monitor for sedation, hold if RR<12  -Monitor EKG qtc interval  -Obtain B-HCG

## 2020-05-31 NOTE — PROGRESS NOTE BEHAVIORAL HEALTH - NSBHCHARTREVIEWLAB_PSY_A_CORE FT
11.3   10.50 )-----------( 370      ( 31 May 2020 06:20 )             32.8     05-31    135  |  103  |  9   ----------------------------<  88  3.6   |  20<L>  |  0.72    Ca    9.4      31 May 2020 06:20  Phos  3.8     05-31  Mg     2.3     05-31

## 2020-06-01 ENCOUNTER — TRANSCRIPTION ENCOUNTER (OUTPATIENT)
Age: 25
End: 2020-06-01

## 2020-06-01 DIAGNOSIS — R56.9 UNSPECIFIED CONVULSIONS: ICD-10-CM

## 2020-06-01 LAB
ANION GAP SERPL CALC-SCNC: 15 MMO/L — HIGH (ref 7–14)
BASOPHILS # BLD AUTO: 0.04 K/UL — SIGNIFICANT CHANGE UP (ref 0–0.2)
BASOPHILS NFR BLD AUTO: 0.4 % — SIGNIFICANT CHANGE UP (ref 0–2)
BUN SERPL-MCNC: 15 MG/DL — SIGNIFICANT CHANGE UP (ref 7–23)
CALCIUM SERPL-MCNC: 9.5 MG/DL — SIGNIFICANT CHANGE UP (ref 8.4–10.5)
CHLORIDE SERPL-SCNC: 109 MMOL/L — HIGH (ref 98–107)
CO2 SERPL-SCNC: 19 MMOL/L — LOW (ref 22–31)
CREAT SERPL-MCNC: 0.72 MG/DL — SIGNIFICANT CHANGE UP (ref 0.5–1.3)
EOSINOPHIL # BLD AUTO: 0.3 K/UL — SIGNIFICANT CHANGE UP (ref 0–0.5)
EOSINOPHIL NFR BLD AUTO: 3.1 % — SIGNIFICANT CHANGE UP (ref 0–6)
GLUCOSE SERPL-MCNC: 102 MG/DL — HIGH (ref 70–99)
HCT VFR BLD CALC: 33.9 % — LOW (ref 34.5–45)
HGB BLD-MCNC: 11.7 G/DL — SIGNIFICANT CHANGE UP (ref 11.5–15.5)
IMM GRANULOCYTES NFR BLD AUTO: 0.5 % — SIGNIFICANT CHANGE UP (ref 0–1.5)
LYMPHOCYTES # BLD AUTO: 2.2 K/UL — SIGNIFICANT CHANGE UP (ref 1–3.3)
LYMPHOCYTES # BLD AUTO: 22.6 % — SIGNIFICANT CHANGE UP (ref 13–44)
MAGNESIUM SERPL-MCNC: 2.3 MG/DL — SIGNIFICANT CHANGE UP (ref 1.6–2.6)
MCHC RBC-ENTMCNC: 31.2 PG — SIGNIFICANT CHANGE UP (ref 27–34)
MCHC RBC-ENTMCNC: 34.5 % — SIGNIFICANT CHANGE UP (ref 32–36)
MCV RBC AUTO: 90.4 FL — SIGNIFICANT CHANGE UP (ref 80–100)
MONOCYTES # BLD AUTO: 0.63 K/UL — SIGNIFICANT CHANGE UP (ref 0–0.9)
MONOCYTES NFR BLD AUTO: 6.5 % — SIGNIFICANT CHANGE UP (ref 2–14)
NEUTROPHILS # BLD AUTO: 6.5 K/UL — SIGNIFICANT CHANGE UP (ref 1.8–7.4)
NEUTROPHILS NFR BLD AUTO: 66.9 % — SIGNIFICANT CHANGE UP (ref 43–77)
NRBC # FLD: 0 K/UL — SIGNIFICANT CHANGE UP (ref 0–0)
PHOSPHATE SERPL-MCNC: 4.1 MG/DL — SIGNIFICANT CHANGE UP (ref 2.5–4.5)
PLATELET # BLD AUTO: 379 K/UL — SIGNIFICANT CHANGE UP (ref 150–400)
PMV BLD: 10.3 FL — SIGNIFICANT CHANGE UP (ref 7–13)
POTASSIUM SERPL-MCNC: 3.8 MMOL/L — SIGNIFICANT CHANGE UP (ref 3.5–5.3)
POTASSIUM SERPL-SCNC: 3.8 MMOL/L — SIGNIFICANT CHANGE UP (ref 3.5–5.3)
RBC # BLD: 3.75 M/UL — LOW (ref 3.8–5.2)
RBC # FLD: 12.8 % — SIGNIFICANT CHANGE UP (ref 10.3–14.5)
SODIUM SERPL-SCNC: 143 MMOL/L — SIGNIFICANT CHANGE UP (ref 135–145)
WBC # BLD: 9.72 K/UL — SIGNIFICANT CHANGE UP (ref 3.8–10.5)
WBC # FLD AUTO: 9.72 K/UL — SIGNIFICANT CHANGE UP (ref 3.8–10.5)

## 2020-06-01 PROCEDURE — 95816 EEG AWAKE AND DROWSY: CPT | Mod: 26

## 2020-06-01 PROCEDURE — 70553 MRI BRAIN STEM W/O & W/DYE: CPT | Mod: 26

## 2020-06-01 PROCEDURE — 93010 ELECTROCARDIOGRAM REPORT: CPT

## 2020-06-01 PROCEDURE — 99233 SBSQ HOSP IP/OBS HIGH 50: CPT

## 2020-06-01 PROCEDURE — 99233 SBSQ HOSP IP/OBS HIGH 50: CPT | Mod: GC

## 2020-06-01 RX ORDER — AMOXICILLIN 250 MG/5ML
875 SUSPENSION, RECONSTITUTED, ORAL (ML) ORAL
Refills: 0 | Status: DISCONTINUED | OUTPATIENT
Start: 2020-06-01 | End: 2020-06-01

## 2020-06-01 RX ADMIN — LITHIUM CARBONATE 300 MILLIGRAM(S): 300 TABLET, EXTENDED RELEASE ORAL at 12:40

## 2020-06-01 RX ADMIN — Medication 1 TABLET(S): at 16:19

## 2020-06-01 RX ADMIN — Medication 5 MILLIGRAM(S): at 16:18

## 2020-06-01 RX ADMIN — Medication 2000 UNIT(S): at 12:41

## 2020-06-01 RX ADMIN — LITHIUM CARBONATE 600 MILLIGRAM(S): 300 TABLET, EXTENDED RELEASE ORAL at 21:42

## 2020-06-01 RX ADMIN — CLOZAPINE 150 MILLIGRAM(S): 150 TABLET, ORALLY DISINTEGRATING ORAL at 21:42

## 2020-06-01 RX ADMIN — SENNA PLUS 2 TABLET(S): 8.6 TABLET ORAL at 21:43

## 2020-06-01 RX ADMIN — Medication 200 MILLIGRAM(S): at 21:41

## 2020-06-01 NOTE — PROGRESS NOTE ADULT - PROBLEM SELECTOR PLAN 4
Etiology likely post-op pain considering pt recently had cholecystectomy. Imaging with concern for gallbladder fossa fluid collection with concern for hematoma and/or abscess.  Lab tests with improving WBC and normal liver labs.  Exam with minimal abdominal tenderness and tmax of only 100.1 and baseline soft BP.  -Low suspicion for infection despite CT findings. Surgery in agreement with no abx.  -S/p zosyn and macrobid in the ED, now on CTX   -F/u cultures  -Monitor abdominal exam  -If vital sign instability or worsened abdominal pain, nausea or vomiting, can restart zosyn for intraabdominal infection coverage  -On review of prior admission records, baseline blood pressure appears to be around 90-100s/50-60s.  -COVID neg -d/c Wellbutrin and Adderall due to current concerns of seizures  -Decrease Luvox to 50mg HS for 3 days then d/c on Monday, June 1   -c/w Clozapine 150mg qHS w/hold parameters, monitor WBC's, ANCs, monitor orthostatics  -c/w Ativan 2mg HS w/nightly bedchecks, monitor for sedation, hold if RR<12  -c/w Lithium 300mg daily, 600mg HS-monitor BUN/CR  -Ativan 2mg PO/IM/IV Q6H PRN -agitation, monitor for sedation, hold if RR<12

## 2020-06-01 NOTE — PROGRESS NOTE ADULT - PROBLEM SELECTOR PLAN 6
Improve score 0.  Low risk for VTE. 1.  Name of PCP:  2.  PCP Contacted on Admission: [ ] Y    [x] N  Night admission  3.  PCP contacted at Discharge: [ ] Y    [ ] N    [ ] N/A  4.  Post-Discharge Appointment Date and Location:  5.  Summary of Handoff given to PCP:

## 2020-06-01 NOTE — PROGRESS NOTE ADULT - PROBLEM SELECTOR PLAN 2
-UCx growing GPC in pairs and chains. Concern for GBS UTI vs contaminant  -C/w CTX for 5d   -Await speciation of UCx Patient's signs and symptoms may be explained by polypharmacy and her recent cholecystectomy  -Per psych, can c/w PM clozapine. No need to start anew unless pt misses 2 doses on consecutive days.   - Will have NF assess pt nightly and if awake/alert will give PM ativan, otherwise will hold  -I-stop in chart 5/29  -Psych and I both discussed meds with patient at bedside  -Meds as below

## 2020-06-01 NOTE — PROGRESS NOTE ADULT - ATTENDING COMMENTS
A 25 year old woman with a hx of bipolar disorder, borderline personality disorder, urinary incontinence, and recent cholecystectomy for acute cholecystitis who presents for stuttering, forgetfulness and abnormal gait and had abdominal pain in the ED with CT findings concerning for GB fossa fluid collection. Patient seen and evaluated at bedside. More somnolent this AM, but arousable- able to state her name and , moving all extremities in bed with meaningful intention.     Abdominal Pain: Present on admission, now resolving/improving.   - Afebrile, hemodynamically stable, with resolving leukocytosis.   - CT: GB Fossa Fluid Collection in the setting of recent Cholecystectomy.   - Appreciate Sx- likely post-operative changes. No acute surgical intervention indicated.  - GB Folla flui collection not likely infectious per Surg, hold of on AB for intraabd infection.  - UA+cultures E. Coli + >100,000, blood cultures negative. Adjust antibiotics per cultures.  - COVID negative.      Stuttering, Forgetfulness, Abnormal Gait: In the setting of ?Absence Seizures.   - CT Angio Head/ Neck negative for acute pathology.   - Spot EEG showing areas of epileptiform activity however no seizures   - Neuro rec inpatient 24 EEG if admitted or outpt EEG if to be dcd.   - Follow up 24 hour VEEG ordered for  (Per neuro).   - Brain MRI w/ contrast, w/o contrast (): negative for acute findings.   - C/w Zonisamide 200 mg qhs. Increase by 100mg every 7 days. Goal of 300mg qhs  - HELD Welbutrin and Adderall, additional med changes per psych.   - May benefit from Lamictal- to be titrated up slowly as outpatient.  - Seizure precautions, Aspiration precautions.     Bipolar Disease: History of w/ concern for Polypharmacy.   - Behavioral Health consulted, recommendations appreciated.   - D/c Wellbutrin and Adderall due to current concerns of seizures  - DECREASED Luvox to 50mg HS for 3 days then d/c on Monday,    - c/w Clozapine 150 HS-monitor WBC's, ANCs, monitor orthostatics  - Hold Trazodone 200mg HS, hold if qtc >500. Consider d/c as decreases seizure threshold  - c/w Ativan 2mg HS-monitor for sedation, hold if RR<12  - c/w Lithium 300mg daily, 600mg HS-monitor BUN/CR. Lithium level wnl.   - QTC monitoring, WBC, Renal function monitoring.   - Utox negative, Ethanol, Salicylate, and Tylenol level wnl.     Activity as tolerated.   Diet: as tolerated.  Dispo: likely home when acute issues resolve, 1-2 days.

## 2020-06-01 NOTE — PROGRESS NOTE BEHAVIORAL HEALTH - NSBHCHARTREVIEWIMAGING_PSY_A_CORE FT
11.7   9.72  )-----------( 379      ( 01 Jun 2020 05:50 )             33.9       06-01    143  |  109<H>  |  15  ----------------------------<  102<H>  3.8   |  19<L>  |  0.72    Ca    9.5      01 Jun 2020 05:50  Phos  4.1     06-01  Mg     2.3     06-01    TSH 4.22  fT3 2.32 (wnl)  fT4 0.98 (wnl)
no new
no new

## 2020-06-01 NOTE — PROGRESS NOTE ADULT - PROBLEM SELECTOR PLAN 5
-d/c Wellbutrin and Adderall due to current concerns of seizures  -DECREASE Luvox to 50mg HS for 3 days then d/c on Monday, June 1   -c/w Clozapine 25mg daily, 300mg HS-monitor WBC's, ANCs, monitor orthostatics  -c/w Trazodone 200mg HS, hold if qtc >500. Will consider DCing due to decreasing seizure threshold  -c/w Ativan 2mg HS-monitor for sedation, hold if RR<12  -c/w Lithium 300mg daily, 600mg HS-monitor BUN/CR  -Ativan 2mg PO/IM/IV Q6H PRN -agitation, monitor for sedation, hold if RR<12 Improve score 0.  Low risk for VTE.

## 2020-06-01 NOTE — DISCHARGE NOTE PROVIDER - HOSPITAL COURSE
HPI    25 year old woman with a hx of bipolar disorder, borderline personality disorder, urinary incontinence, and recent cholecystectomy for acute cholecystitis who presents for stuttering, forgetfulness and abnormal gait.          Patient is a poor historian at time of evaluation.        She reports she presented to the hospital for "a lot of different things."  On further questioning, she focuses mainly on her stuttering and gait and memory issues that have been ongoing for about 1 month  She denies any significant abdominal pain, nausea, vomiting, fevers prior to arrival.  She was reportedly previously evaluated by outpatient psychiatry with addition of medications.          In the ED, she had a tmax of 100.1, mild tachycardia to 101, and baseline soft BPs in the 90-100s/50-60s based on prior records.  Initial labs showed a leukocytosis downtrending from prior admission.  Neurology evaluated and recommended outpatient EEG for possible seizure but was most concerned that neurologic symptoms related to polypharmacy or delirium.  She had abdominal pain, nausea and vomiting in the ED and a CT a/p and abd ultrasound showed concern for fluid collection in the gallbladder fossa possibly from hematoma or abscess.  Surgery evaluated and felt that the imaging findings could just be normal post surgical findings and recommended against antibiotics.  She was given empiric zosyn and then macrobid, fluids, zofran, and toradol and admitted for further evaluation.        Hospital Course    Pt was evaluated by neurology, to whom she described episodes of "spacing out". Given she was on multiple meds which reduced the seizure threshold, EEG performed, which showed no seizure but did show generalized and focal epileptiform activity. Pt started on zonisamide. MRI performed to locate possible epileptogenic focus, but was negative for such, and 24hr vEEG performed, which showed ___.     Psych consulted for polypharmacy and several medication changes made, including stopping meds such as adderall which could lower the seizure threshold. Pt initially sedated following changes, but improved and returned to her baseline mental status.     While initially off abx given her equivocal UA, UCx grew enterococcus and pt completed 5d course of augmentin. She remained afebrile and asymptomatic. Patient's abdominal pain improved without other intervention.      Patient should follow-up with her PCP, as well as with psych and neuro. HPI    25 year old woman with a hx of bipolar disorder, borderline personality disorder, urinary incontinence, and recent cholecystectomy for acute cholecystitis who presents for stuttering, forgetfulness and abnormal gait.          Patient is a poor historian at time of evaluation.        She reports she presented to the hospital for "a lot of different things."  On further questioning, she focuses mainly on her stuttering and gait and memory issues that have been ongoing for about 1 month  She denies any significant abdominal pain, nausea, vomiting, fevers prior to arrival.  She was reportedly previously evaluated by outpatient psychiatry with addition of medications.          In the ED, she had a tmax of 100.1, mild tachycardia to 101, and baseline soft BPs in the 90-100s/50-60s based on prior records.  Initial labs showed a leukocytosis downtrending from prior admission.  Neurology evaluated and recommended outpatient EEG for possible seizure but was most concerned that neurologic symptoms related to polypharmacy or delirium.  She had abdominal pain, nausea and vomiting in the ED and a CT a/p and abd ultrasound showed concern for fluid collection in the gallbladder fossa possibly from hematoma or abscess.  Surgery evaluated and felt that the imaging findings could just be normal post surgical findings and recommended against antibiotics.  She was given empiric zosyn and then macrobid, fluids, zofran, and toradol and admitted for further evaluation.        Hospital Course    Pt was evaluated by neurology, to whom she described episodes of "spacing out". Given she was on multiple meds which reduced the seizure threshold, EEG performed, which showed no seizure but did show generalized and focal epileptiform activity. Pt started on zonisamide. MRI performed to locate possible epileptogenic focus, but was negative for such, and 24hr vEEG performed, which showed one focus of hyperactivity but no chavez seizure. 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 Zonisamide titrated to target dose of 300mg qhs. Pt will continue on this dose after discharge.     Psych consulted for polypharmacy and several medication changes made, including stopping meds such as adderall which could lower the seizure threshold; clozapine reduced for same reason. Pt initially sedated following changes, but improved and returned to her baseline mental status. Psych recommended voluntary admission to [facility] for further medication titration, however patient and mother refused thus plan made for outpatient follow-up at [facility].     While initially off abx given her equivocal UA, UCx grew enterococcus and pt completed 5d course of augmentin. She remained afebrile and asymptomatic. Patient's abdominal pain improved without other intervention.      Patient should follow-up with her PCP, as well as with psych ([facility] outpatient clinic) and neuro ([facility] epilepsy clinic). She should continue zonisamide. She will be provided with home care to assist with medication management. Due to her new diagnosis of epilepsy, pt has the following restrictions: No driving, operating heavy machinery, water sports/bathing, activity in elevation without appropriate safety precautions HPI    25 year old woman with a hx of bipolar disorder, borderline personality disorder, urinary incontinence, and recent cholecystectomy for acute cholecystitis who presents for stuttering, forgetfulness and abnormal gait.          Patient is a poor historian at time of evaluation.        She reports she presented to the hospital for "a lot of different things."  On further questioning, she focuses mainly on her stuttering and gait and memory issues that have been ongoing for about 1 month  She denies any significant abdominal pain, nausea, vomiting, fevers prior to arrival.  She was reportedly previously evaluated by outpatient psychiatry with addition of medications.          In the ED, she had a tmax of 100.1, mild tachycardia to 101, and baseline soft BPs in the 90-100s/50-60s based on prior records.  Initial labs showed a leukocytosis downtrending from prior admission.  Neurology evaluated and recommended outpatient EEG for possible seizure but was most concerned that neurologic symptoms related to polypharmacy or delirium.  She had abdominal pain, nausea and vomiting in the ED and a CT a/p and abd ultrasound showed concern for fluid collection in the gallbladder fossa possibly from hematoma or abscess.  Surgery evaluated and felt that the imaging findings could just be normal post surgical findings and recommended against antibiotics.  She was given empiric zosyn and then macrobid, fluids, zofran, and toradol and admitted for further evaluation.        Hospital Course    Pt was evaluated by neurology, to whom she described episodes of "spacing out". Given she was on multiple meds which reduced the seizure threshold, EEG performed, which showed no seizure but did show generalized and focal epileptiform activity. Pt started on zonisamide. MRI performed to locate possible epileptogenic focus, but was negative for such, and 24hr vEEG performed, which showed one focus of hyperactivity but no chavez seizure. 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 Zonisamide titrated to target dose of 300mg qhs. Pt will continue on this dose after discharge.     Psych consulted for polypharmacy and several medication changes made, including stopping meds such as adderall which could lower the seizure threshold; clozapine reduced for same reason. Pt initially sedated following changes, but improved and returned to her baseline mental status. Psych recommended voluntary admission to [facility] for further medication titration, however patient and mother refused thus plan made for outpatient follow-up at [facility]. Psych also requested appointment to be made with bipolar clinic.     While initially off abx given her equivocal UA, UCx grew enterococcus and pt completed 5d course of augmentin. She remained afebrile and asymptomatic. Patient's abdominal pain improved without other intervention.      Patient should follow-up with her PCP, as well as with psych ([facility] outpatient clinic) and neuro ([facility] epilepsy clinic). She should continue zonisamide. She will be provided with home care to assist with medication management. Due to her new diagnosis of epilepsy, pt has the following restrictions: No driving, operating heavy machinery, water sports/bathing, activity in elevation without appropriate safety precautions HPI    25 year old woman with a hx of bipolar disorder, borderline personality disorder, urinary incontinence, and recent cholecystectomy for acute cholecystitis who presents for stuttering, forgetfulness and abnormal gait.          Patient is a poor historian at time of evaluation.        She reports she presented to the hospital for "a lot of different things."  On further questioning, she focuses mainly on her stuttering and gait and memory issues that have been ongoing for about 1 month  She denies any significant abdominal pain, nausea, vomiting, fevers prior to arrival.  She was reportedly previously evaluated by outpatient psychiatry with addition of medications.          In the ED, she had a tmax of 100.1, mild tachycardia to 101, and baseline soft BPs in the 90-100s/50-60s based on prior records.  Initial labs showed a leukocytosis downtrending from prior admission.  Neurology evaluated and recommended outpatient EEG for possible seizure but was most concerned that neurologic symptoms related to polypharmacy or delirium.  She had abdominal pain, nausea and vomiting in the ED and a CT a/p and abd ultrasound showed concern for fluid collection in the gallbladder fossa possibly from hematoma or abscess.  Surgery evaluated and felt that the imaging findings could just be normal post surgical findings and recommended against antibiotics.  She was given empiric zosyn and then macrobid, fluids, zofran, and toradol and admitted for further evaluation.        Hospital Course    Pt was evaluated by neurology, to whom she described episodes of "spacing out". Given she was on multiple meds which reduced the seizure threshold, EEG performed, which showed no seizure but did show generalized and focal epileptiform activity. Pt started on zonisamide. MRI performed to locate possible epileptogenic focus, but was negative for such, and 24hr vEEG performed, which showed one focus of hyperactivity but no chavez seizure. 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 Zonisamide titrated to target dose of 300mg qhs. Pt will continue on this dose after discharge.     Psych consulted for polypharmacy and several medication changes made, including stopping meds such as adderall which could lower the seizure threshold; clozapine reduced for same reason. Pt initially sedated following changes, but improved and returned to her baseline mental status. Psych recommended voluntary admission to [facility] for further medication titration, however patient and mother refused thus plan made for outpatient follow-up at [facility]. Numbers provided for [facility] outpatient clinic and [facility].     While initially off abx given her equivocal UA, UCx grew enterococcus and pt completed 5d course of augmentin. She remained afebrile and asymptomatic. Patient's abdominal pain improved without other intervention.      Patient should follow-up with her PCP, as well as with psych ([facility] outpatient clinic) and neuro ([facility] epilepsy clinic). She should continue zonisamide. She will be provided with home care to assist with medication management. Due to her new diagnosis of epilepsy, pt has the following restrictions: No driving, operating heavy machinery, water sports/bathing, activity in elevation without appropriate safety precautions

## 2020-06-01 NOTE — PROGRESS NOTE ADULT - PROBLEM SELECTOR PLAN 3
History obtained by neuro significant for possible absence seizures  -Spot EEG showing areas of epileptiform activity however no seizures   -Neuro rec inpatient 24 EEG if admitted or outpt EEG if to be dcd  -24 hour vEEG ordered  -C/w zonisamide 200 mg qhs. increase by 100mg every 7 days. goal of 300mg qhs  -hold Welbutrin and Adderall, additional med changes per psych  -may benefit from Lamictal. would have to be titrated up slowly as outpatient. -UCx enterococci  -Will treat with 5d of augmentin (6/1 - )

## 2020-06-01 NOTE — DISCHARGE NOTE PROVIDER - NSDCFUADDAPPT_GEN_ALL_CORE_FT
Please make an appointment with:  - Your primary care doctor, within 2 weeks  - An epilepsy specialist, within 1 week. You may choose Dr. Lerner, Dr. Torres, or Dr. Johnson. All are located at [address] [place] [address], in [place]. You can call [phone] to make an appointment.   - Your urologist, as needed, for your urinary incontinence    Our psychiatry department has requested an appointment for you with the bipolar disease clinic. Please make an appointment with:  - A psychiatrist at [facility], within 1 week. You can call [phone] to set up an appointment. Alternatively, you can walk in to the [facility] from 9AM to 7PM, or call at [phone].  - An epilepsy specialist, within 1 week. You may choose Dr. Lerner, Dr. Torres, or Dr. Johnson. All are located at [address] [place] [address], in [place]. You can call [phone] to make an appointment.   - Your primary care doctor, within 2 weeks.  - Your urologist, as needed, for your urinary incontinence.

## 2020-06-01 NOTE — DISCHARGE NOTE PROVIDER - NSDCMRMEDTOKEN_GEN_ALL_CORE_FT
acetaminophen 325 mg oral tablet: 2 tab(s) orally every 6 hours, As needed, Mild Pain (1 - 3)  Amitiza 24 mcg oral capsule: 1 cap(s) orally 2 times a day  amphetamine salt: 10 milligram(s) orally once a day  buPROPion 75 mg oral tablet: 1 tab(s) orally once a day  cholecalciferol oral tablet: 2000 unit(s) orally once a day  cloZAPine 100 mg oral tablet: 3 tab(s) orally once a day (at bedtime)  cloZAPine 25 mg oral tablet: 1 tab(s) orally once a day  dextroamphetamine-amphetamine 5 mg oral tablet: 1 tab(s) orally once a day  fluvoxaMINE: 100 milligram(s) orally once a day (at bedtime)  lithium 300 mg oral tablet, extended release: 1 tab(s) orally once a day  lithium 300 mg oral tablet, extended release: 2 tab(s) orally once a day (at bedtime)  LORazepam 2 mg oral tablet: 1 tab(s) orally once a day (at bedtime)  melatonin 3 mg oral tablet: 2 tab(s) orally , As needed, Insomnia  mybetriq: 50 milligram(s) orally once a day  notrel: 1 tab(s) orally once a day  oxyCODONE 5 mg oral tablet: 1 tab(s) orally every 6 hours, As Needed -Moderate Pain (4 - 6) MDD:4 tablets  traZODone 100 mg oral tablet: 2 tab(s) orally once a day (at bedtime) Amitiza 24 mcg oral capsule: 1 cap(s) orally 2 times a day  cholecalciferol oral tablet: 2000 unit(s) orally once a day  cloZAPine 50 mg oral tablet: 3 tab(s) orally once a day (at bedtime)  lithium 300 mg oral capsule: 1 cap(s) orally once a day  lithium 300 mg oral tablet, extended release: 2 tab(s) orally once a day (at bedtime)  melatonin 3 mg oral tablet: 2 tab(s) orally , As needed, Insomnia  mybetriq: 50 milligram(s) orally once a day  notrel: 1 tab(s) orally once a day  zonisamide 100 mg oral capsule: 3 cap(s) orally once a day (at bedtime)

## 2020-06-01 NOTE — PROGRESS NOTE BEHAVIORAL HEALTH - NSBHCONSULTRECOMMENDOTHER_PSY_A_CORE FT
f/u Drea Bailey NP @ [facility] [phone])

## 2020-06-01 NOTE — DISCHARGE NOTE PROVIDER - CARE PROVIDER_API CALL
Nolan Lerner  CLINICAL NEUROPHYSIOLOGY  [address] [place] [address]  Phone: [phone]  Fax: [phone]  Follow Up Time: 1 week    Your primary care doctor,   Phone: (   )    -  Fax: (   )    -  Follow Up Time:

## 2020-06-01 NOTE — PROGRESS NOTE BEHAVIORAL HEALTH - SUMMARY
24 yo , single, female residing with roommate with PMHx urinary incontinence, PSHx cholecystectomy, as per chart, PPHx bipolar disorder with psychotic features, SIB BPD, MDD, dysthymic disorder, presents to [facility] for c/o forgetfulness, twitching/shaking, eye droop and abnormal gait for last month. CL psychiatry consulted for medication management.    Today's exam appears to be significant improvement relative to lethargy observed yesterday, more similar to exam documented Saturday 5/30.  Pending 24-hour EEG, r/o other cause of delirium  Psych CL discontinued wellbutrin and adderall as they can lower seizure threshold.    5/30 - Today's lithium level is therepeutic.  In the setting of concern for possible epileptiform activity would recommend the following: (will lower clozapine, will stop trazodone, will ask for another lithium level tomorrow, address medical/neurologic issues)    5/31 - pt lethargic, unable to engage in assessment, likely multifactorial (discontinuation of stimulating agents (adderall/wellbutrin), UTI/delirium). Lithium level within therapeutic window this AM. See recs below     6/1 - much more alert/awake since yesterday's lethargy, possibly related to adjustment s/p holding stimulant and bupropion, possibly related to improvement in sz activity if improved in response to med changes.     PLAN:  -Delirium workup per medical team, continuing to consider additional possible etiologies if persistent  -Continue to hold adderall/wellbutrin as these may lower seizure threshold  -Continue to hold trazodone 200mg qhs due to sedation  -d/c fluvoxamine (end of taper)  -Continue Clozapine 150mg qhs, resuming 25mg AM if no concern for seizures (pending EEG, mental status improved). Would prioritize resuming previous dosing given likely most significant part of regimen  -Monitor ANC, orthostatics   -c/w Ativan 2mg HS-monitor for sedation, hold if RR<12  -c/w Lithium 300mg daily, 600mg HS-monitor BUN/CR - level on 5/31 0.89  -Ativan 2mg PO/IM/IV Q6H PRN -agitation, monitor for sedation, hold if RR<12  -Monitor EKG qtc interval  -Obtain B-HCG 24 yo , single, female residing with roommate with PMHx urinary incontinence, PSHx cholecystectomy, as per chart, PPHx bipolar disorder with psychotic features, SIB BPD, MDD, dysthymic disorder, presents to [facility] for c/o forgetfulness, twitching/shaking, eye droop and abnormal gait for last month. CL psychiatry consulted for medication management.    Today's exam appears to be significant improvement relative to lethargy observed yesterday, more similar to exam documented Saturday 5/30.  Pending 24-hour EEG, r/o other cause of delirium  Psych CL discontinued wellbutrin and adderall as they can lower seizure threshold.    5/30 - Today's lithium level is therepeutic.  In the setting of concern for possible epileptiform activity would recommend the following: (will lower clozapine, will stop trazodone, will ask for another lithium level tomorrow, address medical/neurologic issues)    5/31 - pt lethargic, unable to engage in assessment, likely multifactorial (discontinuation of stimulating agents (adderall/wellbutrin), UTI/delirium). Lithium level within therapeutic window this AM. See recs below     6/1 - much more alert/awake since yesterday's lethargy, possibly related to adjustment s/p holding stimulant, bupropion, trazodone,  possibly related to improvement in sz activity if improved in response to med changes.     PLAN:  -Delirium workup per medical team, continuing to consider additional possible etiologies if persistent  -Continue to hold adderall/wellbutrin as these may lower seizure threshold  -Continue to hold trazodone 200mg qhs due to sedation  -d/c fluvoxamine (end of taper)  -Continue Clozapine 150mg qhs, if no concern for seizures (pending EEG, mental status improved),would prioritize resuming previous dosing given likely most significant part of regimen.  -Monitor ANC, orthostatics   -c/w Ativan 2mg HS-monitor for sedation, hold if RR<12  -c/w Lithium 300mg daily, 600mg HS-monitor BUN/CR - level on 5/31 0.89  -Ativan 2mg PO/IM/IV Q6H PRN -agitation, monitor for sedation, hold if RR<12  -Monitor EKG qtc interval  -Obtain B-HCG    Will follow

## 2020-06-01 NOTE — PROGRESS NOTE ADULT - SUBJECTIVE AND OBJECTIVE BOX
Author:   Pernell Amaya MD  Internal Medicine, PGY1  [phone]    Patient:  SUSI COOPER  [id number]    Progress Note    Interval events: No acute events. BP too low for clozapine overnight. Also did not get PM ativan because of sedation.   Pertinent ROS (if any):      Administered:  senna: 2 Tablet(s) Oral (05-31 @ 21:12)  lithium: 600 milliGRAM(s) Oral (05-31 @ 21:12)  fluvoxaMINE: 50 milliGRAM(s) Oral (05-31 @ 21:12)  zonisamide: 200 milliGRAM(s) Oral (05-31 @ 21:11)        OBJECTIVE:    05-31 @ 07:01  -  06-01 @ 07:00  --------------------------------------------------------  IN: 200 mL / OUT: 850 mL / NET: -650 mL      CAPILLARY BLOOD GLUCOSE            VITALS:  T(F): 97.8 (06-01-20 @ 05:48), Max: 98.4 (05-31-20 @ 10:24)  HR: 80 (06-01-20 @ 05:48) (79 - 87)  BP: 96/58 (06-01-20 @ 05:48) (33/57 - 96/58)  RR: 16 (06-01-20 @ 05:48) (15 - 16)  SpO2: 98% (06-01-20 @ 05:48) (98% - 99%)    PHYSICAL EXAM:  GENERAL: NAD, lying in bed comfortably  HEAD:  Atraumatic, Normocephalic  EYES: EOMI, PERRLA, conjunctiva and sclera clear  ENT: Moist mucous membranes  NECK: Supple, No JVD  CHEST/LUNG: Clear to auscultation bilaterally; No rales, rhonchi, wheezing, or rubs. Unlabored respirations  HEART: Regular rate and rhythm; No murmurs, rubs, or gallops  ABDOMEN: Bowel sounds present; Soft, Nontender, Nondistended. No hepatomegaly  EXTREMITIES:  2+ Peripheral Pulses, brisk capillary refill. No clubbing, cyanosis, or edema  NERVOUS SYSTEM:  Alert & Oriented X3, speech clear. No deficits   MSK: FROM all 4 extremities, full and equal strength  SKIN: No rashes or lesions    HOSPITAL MEDICATIONS:  Standing Meds:  bisacodyl 5 milliGRAM(s) Oral once  cefTRIAXone   IVPB 1000 milliGRAM(s) IV Intermittent every 24 hours  cefTRIAXone   IVPB      cholecalciferol 2000 Unit(s) Oral daily  cloZAPine 150 milliGRAM(s) Oral at bedtime  lithium 300 milliGRAM(s) Oral daily  lithium 600 milliGRAM(s) Oral at bedtime  LORazepam     Tablet 2 milliGRAM(s) Oral at bedtime  senna 2 Tablet(s) Oral at bedtime  zonisamide 200 milliGRAM(s) Oral at bedtime      PRN Meds:  LORazepam     Tablet 2 milliGRAM(s) Oral every 6 hours PRN  ondansetron Injectable 4 milliGRAM(s) IV Push three times a day PRN      LABS:  CBC 06-01-20 @ 05:50                        11.7   9.72  )-----------( 379                   33.9       Hgb trend: 11.7 <-- , 11.3 <-- , 11.4 <--   WBC trend: 9.72 <-- , 10.50 <-- , 11.18 <--       CMP 06-01-20 @ 05:50    143  |  109<H>  |  15  ----------------------------<  102<H>  3.8   |  19<L>  |  0.72    Ca    9.5      06-01-20 @ 05:50  Phos  4.1     06-01  Mg     2.3     06-01        Serum Cr trend: 0.72 <-- , 0.72 <-- , 0.67 <--         ABG Trend:         MICROBIOLOGY:     Culture - Blood (collected 29 May 2020 12:04)  Source: .Blood Blood-Venous  Preliminary Report (30 May 2020 13:01):    No growth to date.    Culture - Blood (collected 29 May 2020 12:04)  Source: .Blood Blood-Peripheral  Preliminary Report (30 May 2020 13:01):    No growth to date.        RADIOLOGY:  [ ] Reviewed and interpreted by me    EKG: Author:   Pernell Amaya MD  Internal Medicine, PGY1  [phone]/[id number]    Patient:  SUSI COOPER  [id number]    Progress Note    Interval events: No acute events. BP too low for clozapine overnight. Also did not get PM ativan because of sedation.   Pertinent ROS (if any):      Administered:  senna: 2 Tablet(s) Oral (05-31 @ 21:12)  lithium: 600 milliGRAM(s) Oral (05-31 @ 21:12)  fluvoxaMINE: 50 milliGRAM(s) Oral (05-31 @ 21:12)  zonisamide: 200 milliGRAM(s) Oral (05-31 @ 21:11)        OBJECTIVE:    05-31 @ 07:01  -  06-01 @ 07:00  --------------------------------------------------------  IN: 200 mL / OUT: 850 mL / NET: -650 mL      CAPILLARY BLOOD GLUCOSE            VITALS:  T(F): 97.8 (06-01-20 @ 05:48), Max: 98.4 (05-31-20 @ 10:24)  HR: 80 (06-01-20 @ 05:48) (79 - 87)  BP: 96/58 (06-01-20 @ 05:48) (33/57 - 96/58)  RR: 16 (06-01-20 @ 05:48) (15 - 16)  SpO2: 98% (06-01-20 @ 05:48) (98% - 99%)    PHYSICAL EXAM:  GENERAL: NAD, lying in bed comfortably  CHEST/LUNG: Clear to auscultation bilaterally; No rales, rhonchi, wheezing, or rubs. Unlabored respirations  HEART: Regular rate and rhythm; No murmurs, rubs, or gallops  ABDOMEN: Bowel sounds present; Soft, Nontender, Nondistended. No hepatomegaly  EXTREMITIES:  2+ Peripheral Pulses, brisk capillary refill. No clubbing, cyanosis, or edema  NERVOUS SYSTEM:  Alert & Oriented X4, speech clear. No deficits     HOSPITAL MEDICATIONS:  Standing Meds:  bisacodyl 5 milliGRAM(s) Oral once  cefTRIAXone   IVPB 1000 milliGRAM(s) IV Intermittent every 24 hours  cefTRIAXone   IVPB      cholecalciferol 2000 Unit(s) Oral daily  cloZAPine 150 milliGRAM(s) Oral at bedtime  lithium 300 milliGRAM(s) Oral daily  lithium 600 milliGRAM(s) Oral at bedtime  LORazepam     Tablet 2 milliGRAM(s) Oral at bedtime  senna 2 Tablet(s) Oral at bedtime  zonisamide 200 milliGRAM(s) Oral at bedtime      PRN Meds:  LORazepam     Tablet 2 milliGRAM(s) Oral every 6 hours PRN  ondansetron Injectable 4 milliGRAM(s) IV Push three times a day PRN      LABS:  CBC 06-01-20 @ 05:50                        11.7   9.72  )-----------( 379                   33.9       Hgb trend: 11.7 <-- , 11.3 <-- , 11.4 <--   WBC trend: 9.72 <-- , 10.50 <-- , 11.18 <--       CMP 06-01-20 @ 05:50    143  |  109<H>  |  15  ----------------------------<  102<H>  3.8   |  19<L>  |  0.72    Ca    9.5      06-01-20 @ 05:50  Phos  4.1     06-01  Mg     2.3     06-01        Serum Cr trend: 0.72 <-- , 0.72 <-- , 0.67 <--         ABG Trend:         MICROBIOLOGY:     Culture - Blood (collected 29 May 2020 12:04)  Source: .Blood Blood-Venous  Preliminary Report (30 May 2020 13:01):    No growth to date.    Culture - Blood (collected 29 May 2020 12:04)  Source: .Blood Blood-Peripheral  Preliminary Report (30 May 2020 13:01):    No growth to date.        RADIOLOGY:  [ ] Reviewed and interpreted by me    EKG:

## 2020-06-01 NOTE — PROGRESS NOTE BEHAVIORAL HEALTH - CASE SUMMARY
Patient seen and evaluated, I agree with above assessment and plan, pt. AAOX3, calm, cooperative, and able to engage well, thought process is linear and coherent. She denies acute psychotic sxs, adamantly denies SI and HI. Discussed in detail about current psychiatric regimen and reason for making changes in her current psychiatric medications. She verbalized understanding and agreed with the plan. Case discussed with pt.'s mother (pt. gave consent) and Dr. Avila. Recommend meds. as written above, monitor EKG, ANC, BUN/CR, Lithium level, 24h EEG pending, will follow

## 2020-06-01 NOTE — DISCHARGE NOTE PROVIDER - NSDCCPCAREPLAN_GEN_ALL_CORE_FT
PRINCIPAL DISCHARGE DIAGNOSIS  Diagnosis: Generalized epilepsy  Assessment and Plan of Treatment: You came to the hospital complaining of zoning out and staring spells. You were evaluated by Neurology, and multiple tests called EEGs were done to look for seizure activity in your brain. While no actual seizures were seen, there was evidence of electrical hyperactivity which could lead to a seizure. Because of this activity, you likely have a condition called generalized epilepsy. You were started on an anti-seizure medication called Zonisamide, at a dose of 300mg at night. Please continue this. Because of this new diagnosis of epilepsy, there are certain restrictions in your activity, which you must keep in mind for your safety and the safety of others: No driving, operating heavy machinery, water sports/bathing, activity in elevation without appropriate safety precautions.  While you were being evaluated for seizures, some of your psychiatric medications were changed because they can increase your risk of seizures. Adderall and Wellbutrin were stopped for this reason. Clozapine was decreased for this reason. Please take your medications as recommended in this discharge paperwork. Please follow-up with a psychiatrist at [facility]. You can call ____ to set up an appointment. Alternatively,         SECONDARY DISCHARGE DIAGNOSES  Diagnosis: Post surgical complication  Assessment and Plan of Treatment:     Diagnosis: Dysuria  Assessment and Plan of Treatment:     Diagnosis: Fever  Assessment and Plan of Treatment: PRINCIPAL DISCHARGE DIAGNOSIS  Diagnosis: Generalized epilepsy  Assessment and Plan of Treatment: You came to the hospital complaining of zoning out and staring spells. You were evaluated by Neurology, and multiple tests called EEGs were done to look for seizure activity in your brain. While no actual seizures were seen, there was evidence of electrical hyperactivity which could lead to a seizure. Because of this activity, you likely have a condition called generalized epilepsy. You were started on an anti-seizure medication called Zonisamide, at a dose of 300mg at night. Please continue this. Because of this new diagnosis of epilepsy, there are certain restrictions in your activity, which you must keep in mind for your safety and the safety of others: No driving, operating heavy machinery, water sports/bathing, activity in elevation without appropriate safety precautions. Please follow-up with the [facility]psy [facility].    While you were being evaluated for seizures, some of your psychiatric medications were changed because they can increase your risk of seizures. Adderall and Wellbutrin were stopped for this reason. Clozapine was decreased for this reason. Please take your medications as recommended in this discharge paperwork. Please follow-up with a psychiatrist at [facility]. You can call [phone] to set up an appointment. Alternatively, you can walk in to the [facility] from 9AM to 7PM, or call at [phone].        SECONDARY DISCHARGE DIAGNOSES  Diagnosis: Medication management  Assessment and Plan of Treatment: While you were in the hospital, several of your medications were adjusted. As mentioned above, some were changed because they can increase your risk of seizures. Others were changed because you were very sedated while you were in the hospital. Your alertness improved by the time you were discharged. Further medication adjustments may be needed. Please take your medications as directed. Please follow-up with [facility] as above.    Diagnosis: Urinary tract infection  Assessment and Plan of Treatment: You were found to have a urinary tract infection. You were treated with antibiotics. You do not need to continue antibiotics on discharge. Please follow-up with your primary care doctor and urologist. PRINCIPAL DISCHARGE DIAGNOSIS  Diagnosis: Generalized epilepsy  Assessment and Plan of Treatment: You came to the hospital complaining of zoning out and staring spells. You were evaluated by Neurology, and multiple tests called EEGs were done to look for seizure activity in your brain. While no actual seizures were seen, there was evidence of electrical hyperactivity which could lead to a seizure. Because of this activity, you likely have a condition called generalized epilepsy. You were started on an anti-seizure medication called Zonisamide, at a dose of 300mg at night. Please continue this. Because of this new diagnosis of epilepsy, there are certain restrictions in your activity, which you must keep in mind for your safety and the safety of others: No driving, operating heavy machinery, water sports/bathing, activity in elevation without appropriate safety precautions. Please follow-up with the [facility]psy Clinic.    While you were being evaluated for seizures, some of your psychiatric medications were changed because they can increase your risk of seizures. Adderall and Wellbutrin were stopped for this reason. Clozapine was decreased for this reason. Please take your medications as recommended in this discharge paperwork. Please follow-up with a psychiatrist at [facility]. You can call [phone] to set up an appointment. Alternatively, you can walk in to the [facility] from 9AM to 7PM, or call at [phone].        SECONDARY DISCHARGE DIAGNOSES  Diagnosis: Medication management  Assessment and Plan of Treatment: While you were in the hospital, several of your medications were adjusted. As mentioned above, some were changed because they can increase your risk of seizures. Others were changed because you were very sedated while you were in the hospital. Your alertness improved by the time you were discharged. Further medication adjustments may be needed. Please take your medications as directed. Please follow-up with [facility] as above.    Diagnosis: Urinary tract infection  Assessment and Plan of Treatment: You were found to have a urinary tract infection. You were treated with antibiotics. You do not need to continue antibiotics on discharge. Please follow-up with your primary care doctor and urologist.

## 2020-06-01 NOTE — DISCHARGE NOTE PROVIDER - NSDCFUSCHEDAPPT_GEN_ALL_CORE_FT
SUSI COOPER ; 06/09/2020 ; NPP [address] SUSI COOPER ; 06/09/2020 ; NPP [address] SUSI COOPER ; 06/09/2020 ; NPP [address] SUSI COOPER ; 06/09/2020 ; NPP [address]

## 2020-06-01 NOTE — PROVIDER CONTACT NOTE (OTHER) - BACKGROUND
PMH: bipolar disorder, borderline personality disorder, major depressive disorder. Presenting with 1 month of stuttering, gait abnormality, tremors and forgetfulness

## 2020-06-01 NOTE — DISCHARGE NOTE PROVIDER - PROVIDER TOKENS
PROVIDER:[TOKEN:[[id number]],FOLLOWUP:[1 week]],FREE:[LAST:[Your primary care doctor],PHONE:[(   )    -],FAX:[(   )    -]]

## 2020-06-01 NOTE — PROGRESS NOTE ADULT - PROBLEM SELECTOR PLAN 1
Patient's signs and symptoms may be explained by polypharmacy and her recent cholecystectomy  -F/u psych given somnolence this AM. Etiology likely recent med changes specifically d/c of adderall. Will have NF assess pt tonight and if awake/alert will give PM ativan, otherwise will hold  -I-stop in chart 5/29 History obtained by neuro significant for possible absence seizures  -Spot EEG showing areas of epileptiform activity however no seizures   -24 hour vEEG ordered  -C/w zonisamide 200 mg qhs. increase by 100mg every 7 days. goal of 300mg qhs  -hold Welbutrin and Adderall, additional med changes per psych  -may benefit from Lamictal. would have to be titrated up slowly as outpatient.  -q4h neurochecks

## 2020-06-01 NOTE — PROGRESS NOTE BEHAVIORAL HEALTH - NSBHFUPINTERVALHXFT_PSY_A_CORE
BP 84/50 yesterday evening, so clozapine reduced and     Much improved since yesterday, still tired though easily engaged in conversation, cooperative, expressing concern about her medication being held and frustration with feeling that was inadequately communicated to her (though less aware of level of somnolence yesterday), frustrated with her outpatient psychiatric NP who she feels just keeps adding medications whenever she reports any problems (mother expresses similar concerns/dissatisfaction). She is aware of the concern for possible seizure activity and asks appropriately about the 24-hour EEG, expresses belief that she may have had whatever seizure activity due to medication being held. She asserts that she has had low BP before at home, though describing having DBP 70 rather than in the 50s as occurred yesterday, limited ability to engage on this point. She describes herself as doing very well at baseline on clozapine and lorazepam. She confirms recently starting bupropion in context of having reported depressive symptoms though is uncertain about any benefit from it. CL team additionally spoke with mother who confirms pt is doing much better now than over the weekend, eager that she gets on a better streamlined regimen. BP 84/50 yesterday evening, so clozapine held last night, Clozapine dose was reduced over the weekend due to concerns of seizures.     Much improved since yesterday, still tired though easily engaged in conversation, cooperative, expressing concern about her medication being held and frustration with feeling that was inadequately communicated to her (though less aware of level of somnolence yesterday), frustrated with her outpatient psychiatric NP who she feels just keeps adding medications whenever she reports any problems (mother expresses similar concerns/dissatisfaction). She is aware of the concern for possible seizure activity and asks appropriately about the 24-hour EEG. She asserts that she has had low BP before at home, though describing having DBP 70 rather than in the 50s as occurred yesterday, limited ability to engage on this point. She describes herself as doing very well at baseline on clozapine and lorazepam. She confirms recently starting bupropion in context of having reported depressive symptoms though is uncertain about any benefit from it. CL team additionally spoke with mother who was satisfied with  the medications changes, eager that she gets on a better streamlined regimen.

## 2020-06-01 NOTE — EEG REPORT - NS EEG TEXT BOX
[facility]   COMPREHENSIVE EPILEPSY CENTER   REPORT OF ROUTINE EEG W/ Video     [facility]: [address], [place] [address], Ph#: [phone]  [place]: [id number] [address] [place] [address], Ph#: [phone]  [facility]: [address] [place] [address], Ph#: [phone]    Patient Name: SUSI COOPER  Age and : 25y ([birth year])  MRN #: [id number]  Location: [place] [address] A  Referring Physician: Helen Chapa    Study Date: 20    _____________________________________________________________  TECHNICAL INFORMATION    Placement and Labeling of Electrodes:  The EEG was performed utilizing 20 channels referential EEG connections (coronal over temporal over parasagittal montage) using all standard 10-20 electrode placements with EKG.  Recording was at a sampling rate of 256 samples per second per channel.  Time synchronized digital video recording was done simultaneously with EEG recording.  A low light infrared camera was used for low light recording.  Hussain and seizure detection algorithms were utilized.    _____________________________________________________________  HISTORY    Patient is a 25y old  Female who presents with a chief complaint of CC: Stuttering (29 May 2020 07:22)      PERTINENT MEDICATION:  cloZAPine 150 milliGRAM(s) Oral at bedtime  lithium 300 milliGRAM(s) Oral daily  lithium 600 milliGRAM(s) Oral at bedtime  LORazepam     Tablet 2 milliGRAM(s) Oral at bedtime  zonisamide 200 milliGRAM(s) Oral at bedtime    _____________________________________________________________  STUDY INTERPRETATION    Findings: The background was continuous, spontaneously variable and reactive. During wakefulness, the posterior dominant rhythm consisted of symmetric, well-modulated 7-8 Hz activity, with amplitude to 40 uV, that attenuated to eye opening.     Background Slowing:  Diffuse theta and polymorphic delta slowing.    Focal Slowing:   None were present.    Sleep Background:  Drowsiness and stage II sleep transients were not recorded.    Other Non-Epileptiform Findings:  None were present.    Interictal Epileptiform Activity:   - Occasional spike-wave discharges broad left hemisphere max left frontocentral (max F3/C3)     Events:  Clinical events: None recorded.  Seizures: None recorded.    Activation Procedures:   Hyperventilation was performed and did not elicit any abnormality.  Photic stimulation was performed and did not elicit any abnormality.    Artifacts:  Intermittent myogenic and movement artifacts were noted.    ECG:  The heart rate on single channel ECG was predominantly between 50-60 BPM.    _____________________________________________________________  EEG SUMMARY/CLASSIFICATION    Abnormal EEG in the awake state.  - Occasional spike-wave discharges broad left hemisphere max left frontocentral (max F3/C3)   - Mild to moderate generalized slowing.    _____________________________________________________________  EEG IMPRESSION/CLINICAL CORRELATE    Abnormal EEG study.  Fewer discharges were present in the current recording compared the routine EEG of , both in rate and locations.     In the current record only left frontocentral discharges were recorded consistent with a focal focus of hyperexcitability. However, since the previous record also showed focal discharges from other locations as well as generalized epileptiform discharges, a diagnosis of multifocal epilepsy  with rapid bilateral synchrony, or generalized epilepsy is more likely. Clinical correlation is recommended.   No seizure seen.    Mild to moderate nonspecific diffuse or multifocal cerebral dysfunction.     _____________________________________________________________    Petar Eason MD PhD  Attending Physician, [facility]

## 2020-06-01 NOTE — DISCHARGE NOTE PROVIDER - NSFOLLOWUPCLINICS_GEN_ALL_ED_FT
[facility]  Psychiatry  [address] [place] [address]  Phone: [phone]  Fax:   Follow Up Time: 1 week

## 2020-06-01 NOTE — PROGRESS NOTE BEHAVIORAL HEALTH - RISK ASSESSMENT
Per collateral had been doing quite well prior to this hospitalization. Reports anxiety and some residual depression but no SI, no AH/VH, no other persistent psychotic symptoms. Reports medication compliance. Per collateral had been doing quite well prior to this hospitalization. Reports anxiety and some residual depression but no SI, no AH/VH, no other persistent psychotic symptoms. Reports medication compliance.    Will follow

## 2020-06-02 LAB
ANION GAP SERPL CALC-SCNC: 10 MMO/L — SIGNIFICANT CHANGE UP (ref 7–14)
BASOPHILS # BLD AUTO: 0.05 K/UL — SIGNIFICANT CHANGE UP (ref 0–0.2)
BASOPHILS NFR BLD AUTO: 0.6 % — SIGNIFICANT CHANGE UP (ref 0–2)
BUN SERPL-MCNC: 15 MG/DL — SIGNIFICANT CHANGE UP (ref 7–23)
CALCIUM SERPL-MCNC: 9.6 MG/DL — SIGNIFICANT CHANGE UP (ref 8.4–10.5)
CHLORIDE SERPL-SCNC: 105 MMOL/L — SIGNIFICANT CHANGE UP (ref 98–107)
CO2 SERPL-SCNC: 19 MMOL/L — LOW (ref 22–31)
CREAT SERPL-MCNC: 0.7 MG/DL — SIGNIFICANT CHANGE UP (ref 0.5–1.3)
EOSINOPHIL # BLD AUTO: 0.32 K/UL — SIGNIFICANT CHANGE UP (ref 0–0.5)
EOSINOPHIL NFR BLD AUTO: 3.8 % — SIGNIFICANT CHANGE UP (ref 0–6)
GLUCOSE SERPL-MCNC: 94 MG/DL — SIGNIFICANT CHANGE UP (ref 70–99)
HCG UR-SCNC: NEGATIVE — SIGNIFICANT CHANGE UP
HCT VFR BLD CALC: 33.4 % — LOW (ref 34.5–45)
HGB BLD-MCNC: 11.8 G/DL — SIGNIFICANT CHANGE UP (ref 11.5–15.5)
IMM GRANULOCYTES NFR BLD AUTO: 0.7 % — SIGNIFICANT CHANGE UP (ref 0–1.5)
LYMPHOCYTES # BLD AUTO: 2.35 K/UL — SIGNIFICANT CHANGE UP (ref 1–3.3)
LYMPHOCYTES # BLD AUTO: 27.6 % — SIGNIFICANT CHANGE UP (ref 13–44)
MAGNESIUM SERPL-MCNC: 2.4 MG/DL — SIGNIFICANT CHANGE UP (ref 1.6–2.6)
MCHC RBC-ENTMCNC: 32.5 PG — SIGNIFICANT CHANGE UP (ref 27–34)
MCHC RBC-ENTMCNC: 35.3 % — SIGNIFICANT CHANGE UP (ref 32–36)
MCV RBC AUTO: 92 FL — SIGNIFICANT CHANGE UP (ref 80–100)
MONOCYTES # BLD AUTO: 0.53 K/UL — SIGNIFICANT CHANGE UP (ref 0–0.9)
MONOCYTES NFR BLD AUTO: 6.2 % — SIGNIFICANT CHANGE UP (ref 2–14)
NEUTROPHILS # BLD AUTO: 5.21 K/UL — SIGNIFICANT CHANGE UP (ref 1.8–7.4)
NEUTROPHILS NFR BLD AUTO: 61.1 % — SIGNIFICANT CHANGE UP (ref 43–77)
NRBC # FLD: 0 K/UL — SIGNIFICANT CHANGE UP (ref 0–0)
PHOSPHATE SERPL-MCNC: 3.4 MG/DL — SIGNIFICANT CHANGE UP (ref 2.5–4.5)
PLATELET # BLD AUTO: 384 K/UL — SIGNIFICANT CHANGE UP (ref 150–400)
PMV BLD: 10.3 FL — SIGNIFICANT CHANGE UP (ref 7–13)
POTASSIUM SERPL-MCNC: 3.9 MMOL/L — SIGNIFICANT CHANGE UP (ref 3.5–5.3)
POTASSIUM SERPL-SCNC: 3.9 MMOL/L — SIGNIFICANT CHANGE UP (ref 3.5–5.3)
RBC # BLD: 3.63 M/UL — LOW (ref 3.8–5.2)
RBC # FLD: 12.7 % — SIGNIFICANT CHANGE UP (ref 10.3–14.5)
SODIUM SERPL-SCNC: 134 MMOL/L — LOW (ref 135–145)
SP GR UR: 1.02 — SIGNIFICANT CHANGE UP (ref 1–1.04)
WBC # BLD: 8.52 K/UL — SIGNIFICANT CHANGE UP (ref 3.8–10.5)
WBC # FLD AUTO: 8.52 K/UL — SIGNIFICANT CHANGE UP (ref 3.8–10.5)

## 2020-06-02 PROCEDURE — 99232 SBSQ HOSP IP/OBS MODERATE 35: CPT

## 2020-06-02 PROCEDURE — 95720 EEG PHY/QHP EA INCR W/VEEG: CPT

## 2020-06-02 PROCEDURE — 99233 SBSQ HOSP IP/OBS HIGH 50: CPT | Mod: GC

## 2020-06-02 RX ADMIN — Medication 1 TABLET(S): at 18:35

## 2020-06-02 RX ADMIN — SENNA PLUS 2 TABLET(S): 8.6 TABLET ORAL at 22:25

## 2020-06-02 RX ADMIN — CLOZAPINE 150 MILLIGRAM(S): 150 TABLET, ORALLY DISINTEGRATING ORAL at 22:26

## 2020-06-02 RX ADMIN — Medication 200 MILLIGRAM(S): at 22:25

## 2020-06-02 RX ADMIN — LITHIUM CARBONATE 600 MILLIGRAM(S): 300 TABLET, EXTENDED RELEASE ORAL at 22:24

## 2020-06-02 RX ADMIN — Medication 2000 UNIT(S): at 13:43

## 2020-06-02 RX ADMIN — Medication 1 TABLET(S): at 06:35

## 2020-06-02 RX ADMIN — LITHIUM CARBONATE 300 MILLIGRAM(S): 300 TABLET, EXTENDED RELEASE ORAL at 13:43

## 2020-06-02 NOTE — PROGRESS NOTE BEHAVIORAL HEALTH - NSBHCONSULTRECOMMENDOTHER_PSY_A_CORE FT
f/u Drea Bailey NP @ [facility] [phone])

## 2020-06-02 NOTE — PROGRESS NOTE ADULT - PROBLEM SELECTOR PLAN 4
-d/c Wellbutrin and Adderall due to current concerns of seizures  -Off Luvox  -c/w Clozapine 150mg qHS w/hold parameters, monitor WBC's, ANCs, monitor orthostatics  -c/w Ativan 2mg HS w/nightly bedchecks, monitor for sedation, hold if RR<12  -c/w Lithium 300mg daily, 600mg HS-monitor BUN/CR  -Ativan 2mg PO/IM/IV Q6H PRN for agitation, monitor for sedation, hold if RR<12

## 2020-06-02 NOTE — PROGRESS NOTE BEHAVIORAL HEALTH - NSBHCONSULTFOLLOWDETAILS_PSY_A_CORE FT
Refill approved.     Medication changes, seizure activity on EEG  Needs collateral from outpt (left VM, pending callback)

## 2020-06-02 NOTE — PROGRESS NOTE ADULT - SUBJECTIVE AND OBJECTIVE BOX
Author:   Pernell Amaya MD  Internal Medicine, PGY1  [phone]    Patient:  SSUI COOPER  [id number]    Progress Note    Interval events: No acute events. Sleeping yesterday at 9pm thus did not receive ativan.   Pertinent ROS (if any):      Administered:  amoxicillin  875 milliGRAM(s)/clavulanate: 1 Tablet(s) Oral (06-02 @ 06:35)  senna: 2 Tablet(s) Oral (06-01 @ 21:43)  lithium: 600 milliGRAM(s) Oral (06-01 @ 21:42)  cloZAPine: 150 milliGRAM(s) Oral (06-01 @ 21:42)  zonisamide: 200 milliGRAM(s) Oral (06-01 @ 21:41)        OBJECTIVE:    06-01 @ 07:01  -  06-02 @ 07:00  --------------------------------------------------------  IN: 6800 mL / OUT: 0 mL / NET: 6800 mL      CAPILLARY BLOOD GLUCOSE            VITALS:  T(F): 97.3 (06-02-20 @ 06:37), Max: 98.1 (06-01-20 @ 21:28)  HR: 80 (06-02-20 @ 06:37) (80 - 88)  BP: 96/54 (06-01-20 @ 21:28) (94/64 - 96/54)  RR: 15 (06-02-20 @ 06:37) (15 - 16)  SpO2: 100% (06-02-20 @ 06:37) (98% - 100%)    PHYSICAL EXAM:  GENERAL: NAD, lying in bed comfortably  HEAD:  Atraumatic, Normocephalic  EYES: EOMI, PERRLA, conjunctiva and sclera clear  ENT: Moist mucous membranes  NECK: Supple, No JVD  CHEST/LUNG: Clear to auscultation bilaterally; No rales, rhonchi, wheezing, or rubs. Unlabored respirations  HEART: Regular rate and rhythm; No murmurs, rubs, or gallops  ABDOMEN: Bowel sounds present; Soft, Nontender, Nondistended. No hepatomegaly  EXTREMITIES:  2+ Peripheral Pulses, brisk capillary refill. No clubbing, cyanosis, or edema  NERVOUS SYSTEM:  Alert & Oriented X3, speech clear. No deficits   MSK: FROM all 4 extremities, full and equal strength  SKIN: No rashes or lesions    HOSPITAL MEDICATIONS:  Standing Meds:  amoxicillin  875 milliGRAM(s)/clavulanate 1 Tablet(s) Oral two times a day  cholecalciferol 2000 Unit(s) Oral daily  cloZAPine 150 milliGRAM(s) Oral at bedtime  lithium 300 milliGRAM(s) Oral daily  lithium 600 milliGRAM(s) Oral at bedtime  LORazepam     Tablet 2 milliGRAM(s) Oral at bedtime  senna 2 Tablet(s) Oral at bedtime  zonisamide 200 milliGRAM(s) Oral at bedtime      PRN Meds:  LORazepam     Tablet 2 milliGRAM(s) Oral every 6 hours PRN  ondansetron Injectable 4 milliGRAM(s) IV Push three times a day PRN      LABS:  CBC 06-02-20 @ 06:00                        11.8   8.52  )-----------( 384                   33.4       Hgb trend: 11.8 <-- , 11.7 <-- , 11.3 <--   WBC trend: 8.52 <-- , 9.72 <-- , 10.50 <--       CMP 06-02-20 @ 06:00    134<L>  |  105  |  15  ----------------------------<  94  3.9   |  19<L>  |  0.70    Ca    9.6      06-02-20 @ 06:00  Phos  3.4     06-02  Mg     2.4     06-02        Serum Cr trend: 0.70 <-- , 0.72 <-- , 0.72 <--         ABG Trend:         MICROBIOLOGY:       RADIOLOGY:  [ ] Reviewed and interpreted by me    EKG:

## 2020-06-02 NOTE — PROGRESS NOTE ADULT - PROBLEM SELECTOR PLAN 1
History obtained by neuro significant for possible absence seizures  -Spot EEG showing areas of epileptiform activity however no seizures   -Per vEEG: single focus of hyperexcitability  -C/w zonisamide 200 mg qhs. increase by 100mg every 7 days. goal of 300mg qhs  -hold Welbutrin and Adderall, additional med changes per psych  -may benefit from Lamictal. would have to be titrated up slowly as outpatient.  -q4h neurochecks

## 2020-06-02 NOTE — PROGRESS NOTE BEHAVIORAL HEALTH - NSBHFUPINTERVALHXFT_PSY_A_CORE
EEG started yesterday evening with evidence of seizure activity (see 6/2 Neuro EEG note). Intermittently lethargic, often not remembering information previously discussed with her (eg why Ativan was being held). Reduced dose of clozapine given since BP was sufficiently stable.     Initially more lethargic, who awakened and engaging in conversation though with dulled attention and limited short-term memory as in expressing concern about her medication being held and insisting she was not told about it as well as not recalling receiving clozapine dose. She is aware of the concern for possible seizure activity and why she was undergoing 24-hour EEG. Denies significant agitation as she had reported yesterday. Future-oriented.     As agreed to by patient, attempted to contact Drea Bailey NP (outpatient psych NP, [phone]) who's shift starts at 6pm, left voicemail requesting callback. EEG started yesterday evening with evidence of seizure activity (see 6/2 Neuro EEG note). Intermittently lethargic, often not remembering information previously discussed with her (eg why Ativan was being held).     Initially more lethargic, who awakened and engaging in conversation though with dulled attention and limited short-term memory as in expressing concern about her medication being held and insisting she was not told about it as well as not recalling receiving clozapine dose. She is aware of the concern for possible seizure activity and why she was undergoing 24-hour EEG. Denies significant anxiety as she had reported yesterday. Future-oriented. Denies acute psychotic sxs, denies SI and HI.     As agreed to by patient, attempted to contact Drea Bailey NP (outpatient psych NP, [phone]) who's shift starts at 6pm, left voicemail requesting callback.

## 2020-06-02 NOTE — PROGRESS NOTE ADULT - ATTENDING COMMENTS
A 25 year old woman with a hx of bipolar disorder, borderline personality disorder, urinary incontinence, and recent cholecystectomy for acute cholecystitis who presents for stuttering, forgetfulness and abnormal gait and had abdominal pain in the ED with CT findings concerning for GB fossa fluid collection. Patient seen and evaluated at bedside. More awake today, communicative, seen ambulating.     Abdominal Pain: Present on admission, now resolved, 2/2 UTI.   - Afebrile, hemodynamically stable, with resolving leukocytosis.   - CT: GB Fossa Fluid Collection in the setting of recent Cholecystectomy.   - Appreciate Sx- likely post-operative changes. No acute surgical intervention indicated.  - GB Folla flui collection not likely infectious per Surg, hold of on AB for intraabd infection.  - UTI: UA+cultures E. Coli + >100,000, blood cultures negative.   - Adjust antibiotics per cultures: Augmentin 6/1-6/6.   - COVID negative.      Stuttering, Forgetfulness, Abnormal Gait: h/o of ?Absence Seizures. Likely Epilepsy.   - CT Angio Head/ Neck negative for acute pathology.  - Brain MRI w/ contrast, w/o contrast (6/1): negative for acute findings.    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  - C/w Zonisamide 200 mg qhs. Increase by 100mg every 7 days. Goal of 300mg qhs  - HELD Welbutrin and Adderall, additional med changes per psych.   - May benefit from Lamictal- to be titrated up slowly as outpatient.  - Seizure precautions, Aspiration precautions.     Bipolar Disease: History of w/ concern for Polypharmacy.   - Behavioral Health consulted, recommendations appreciated.   - D/c Wellbutrin and Adderall due to current concerns of seizures  - Decreased then discontinued Luvox on 6/1.   - c/w Clozapine 150 HS-monitor WBC's, ANCs, monitor orthostatics  - Hold Trazodone 200mg HS, hold if qtc >500. Consider d/c as decreases seizure threshold  - c/w Ativan 2mg HS-monitor for sedation, hold if RR<12  - c/w Lithium 300mg daily, 600mg HS-monitor BUN/CR. Lithium level wnl.   - QTC monitoring, WBC, Renal function monitoring.   - Utox negative, Ethanol, Salicylate, and Tylenol level wnl.     Urinary Incontinence: previously on Oxybutynin, now on Myrbetriq, f/u private Urology as OP.  Activity as tolerated.   Diet: as tolerated.  Dispo: likely home when acute issues resolve, 1-2 days. Neur/Psych recs prior to discharge.

## 2020-06-02 NOTE — PROGRESS NOTE ADULT - PROBLEM SELECTOR PLAN 2
Patient's signs and symptoms may be explained by polypharmacy and her recent cholecystectomy  -Per psych, can c/w PM clozapine. No need to start anew unless pt misses 2 doses on consecutive days.   - Will have NF assess pt nightly and if awake/alert will give PM ativan, otherwise will hold  -I-stop in chart 5/29  -Psych and I both discussed meds with patient at bedside  -Meds as below

## 2020-06-02 NOTE — PROGRESS NOTE BEHAVIORAL HEALTH - RISK ASSESSMENT
Per collateral had been doing quite well prior to this hospitalization. Reports anxiety and some residual depression but no SI, no AH/VH, no other persistent psychotic symptoms. Reports medication compliance.    Will follow

## 2020-06-02 NOTE — EEG REPORT - NS EEG TEXT BOX
[facility]   COMPREHENSIVE EPILEPSY CENTER     [facility]: [address], [place] [address], Ph#: [phone]  [facility]: [id number] [place] [address], Ph#: [phone]  [facility]: [address] [place] [address], Ph#: [phone]    Patient Name: SUSI COOPER  Age and : 25y ([birth year])  MRN #: [id number]  Location: [place] [address] A  Referring Physician: Helen Chapa    Start Date: 20 14:15  Duration: 17h 44m    _____________________________________________________________  TECHNICAL INFORMATION    Placement and Labeling of Electrodes:  The EEG was performed utilizing 20 channels referential EEG connections (coronal over temporal over parasagittal montage) using all standard 10-20 electrode placements with EKG.  Recording was at a sampling rate of 256 samples per second per channel.  Time synchronized digital video recording was done simultaneously with EEG recording.  A low light infrared camera was used for low light recording.  Hussain and seizure detection algorithms were utilized.    _____________________________________________________________  HISTORY    Patient is a 25y old  Female who presents with a chief complaint of CC: Stuttering (29 May 2020 07:22)    PERTINENT MEDICATION:  cloZAPine 150 milliGRAM(s) Oral at bedtime  lithium 300 milliGRAM(s) Oral daily  lithium 600 milliGRAM(s) Oral at bedtime  LORazepam     Tablet 2 milliGRAM(s) Oral at bedtime  zonisamide 200 milliGRAM(s) Oral at bedtime    _____________________________________________________________  STUDY INTERPRETATION    Findings: The background was continuous, spontaneously variable and reactive. During wakefulness, the posterior dominant rhythm consisted of symmetric, well-modulated 7-8 Hz activity, with amplitude to 40 uV, that attenuated to eye opening.     Background Slowing:  Diffuse theta and polymorphic delta slowing.      Focal Slowing:   None were present.    Sleep Background:  There were symmetric vertex waves, sleep spindles and k-complexes.    Other Non-Epileptiform Findings:  None were present.    Interictal Epileptiform Activity:   - Occasional spike-wave discharges broad left hemisphere max left frontocentral (max F3/C3)   - Rare diffuse more generalized appearing spike wave discharges  - Occasional spike/polyspike-wave discharges in right frontotemporal (F8/T8 >>Fp2/F4)    Events:  Clinical events: None recorded.  Seizures: None recorded.    Activation Procedures:   Hyperventilation was performed and did not elicit any abnormality.  Photic stimulation was performed and did not elicit any abnormality.    Artifacts:  Intermittent myogenic and movement artifacts were noted.    ECG:  The heart rate on single channel ECG was predominantly between 50-60 BPM.    _____________________________________________________________  EEG SUMMARY/CLASSIFICATION    Abnormal EEG in the awake state.  - Occasional spike-wave discharges broad left hemisphere max left frontocentral (max F3/C3)   - Rare diffuse more generalized appearing spike wave discharges  - Occasional spike/polyspike-wave discharges in right frontotemporal (F8/T8 >>Fp2/F4)  - Mild to moderate generalized slowing.    _____________________________________________________________  EEG IMPRESSION/CLINICAL CORRELATE    Abnormal EEG study consistent with:  Both features of multifocal epilepsy potentially with rapid bilateral synchrony, and generalized epilepsy are present. Clinical correlation is recommended.   No seizure seen.    Mild to moderate nonspecific diffuse or multifocal cerebral dysfunction.     _____________________________________________________________    Petar Eason MD PhD  Attending Physician, [facility]

## 2020-06-02 NOTE — PROGRESS NOTE BEHAVIORAL HEALTH - CASE SUMMARY
Patient seen and evaluated, I agree with above assessment and plan, pt. somewhat drowsy and seems tired, however overall cooperative and engaging well, oriented to self, place, year, became more alert as the interview progressed, she denies acute psychotic sxs, adamantly denies SI and HI. Again discussed in detail about her current psychiatric regimen. Left a message for  Drea Bailey NP (outpatient psych NP, [phone]), awaiting call back. EEG results reviewed. Recommend to continue meds. as written above, Continue Clozapine as written above, would not increase yet given current EEG findings. Monitor QTC, ANC, BUN/Cr, lithium level. Will follow

## 2020-06-02 NOTE — PROGRESS NOTE BEHAVIORAL HEALTH - SUMMARY
26 yo , single, female residing with roommate with PMHx urinary incontinence, PSHx cholecystectomy, as per chart, PPHx bipolar disorder with psychotic features, SIB BPD, MDD, dysthymic disorder, presents to [facility] for c/o forgetfulness, twitching/shaking, eye droop and abnormal gait for last month. [facility] psychiatry consulted for medication management.    Today's exam appears similar though more fatigued than yesterday, though she was easily aroused and cooperative with exam, continuing to have difficulty remembering recent communication (eg medication), possibly related to seizure activity, less likely due to medication.   Psych [facility] discontinued wellbutrin and adderall and decreased clozapine as they can lower seizure threshold, also advised holding lorazepam when sedated    5/30 - Today's lithium level is therepeutic.  In the setting of concern for possible epileptiform activity would recommend the following: (will lower clozapine, will stop trazodone, will ask for another lithium level tomorrow, address medical/neurologic issues)    5/31 - pt lethargic, unable to engage in assessment, likely multifactorial (discontinuation of stimulating agents (adderall/wellbutrin), UTI/delirium). Lithium level within therapeutic window this AM. See recs below     6/1 - much more alert/awake since yesterday's lethargy, possibly related to adjustment s/p holding stimulant, bupropion, trazodone,  possibly related to improvement in sz activity if improved in response to med changes.     6/2 - more fatigued than yesterday's exam though easily aroused and cooperative, still forgetting information communicated. Received last night's clozapine since BP was wnl, lorazepam held due to lethargy. EEG suggestive of seizure activity. Pending discussion with outpatient psych NP (left VM for her).    PLAN:  -Continue to hold adderall/wellbutrin as these may lower seizure threshold  -Continue to hold trazodone 200mg qhs due to sedation  -Continued to hold fluvoxamine (end of taper)  -Continue Clozapine 150mg qhs, would not increase yet given seizure activity  -Monitor ANC, orthostatics   -c/w Ativan 2mg HS-monitor for sedation, hold if RR<12  -c/w Lithium 300mg daily, 600mg HS-monitor BUN/CR - level on 5/31 0.89  -Ativan 2mg PO/IM/IV Q6H PRN -agitation, monitor for sedation, hold if RR<12  -Monitor EKG qtc interval  -Obtain B-HCG    Will follow

## 2020-06-02 NOTE — PROGRESS NOTE BEHAVIORAL HEALTH - NSBHCHARTREVIEWLAB_PSY_A_CORE FT
11.8   8.52  )-----------( 384      ( 02 Jun 2020 06:00 )             33.4      Vital Signs Last 24 Hrs  T(C): 36.9 (02 Jun 2020 13:18), Max: 36.9 (02 Jun 2020 13:18)  T(F): 98.5 (02 Jun 2020 13:18), Max: 98.5 (02 Jun 2020 13:18)  HR: 87 (02 Jun 2020 13:18) (80 - 88)  BP: 99/67 (02 Jun 2020 13:18) (90/60 - 99/67)  BP(mean): --  RR: 16 (02 Jun 2020 13:18) (15 - 16)  SpO2: 98% (02 Jun 2020 13:18) (98% - 100%)

## 2020-06-03 DIAGNOSIS — K59.00 CONSTIPATION, UNSPECIFIED: ICD-10-CM

## 2020-06-03 LAB
ANION GAP SERPL CALC-SCNC: 13 MMO/L — SIGNIFICANT CHANGE UP (ref 7–14)
BUN SERPL-MCNC: 15 MG/DL — SIGNIFICANT CHANGE UP (ref 7–23)
CALCIUM SERPL-MCNC: 9.8 MG/DL — SIGNIFICANT CHANGE UP (ref 8.4–10.5)
CHLORIDE SERPL-SCNC: 107 MMOL/L — SIGNIFICANT CHANGE UP (ref 98–107)
CO2 SERPL-SCNC: 19 MMOL/L — LOW (ref 22–31)
CREAT SERPL-MCNC: 0.61 MG/DL — SIGNIFICANT CHANGE UP (ref 0.5–1.3)
CULTURE RESULTS: SIGNIFICANT CHANGE UP
CULTURE RESULTS: SIGNIFICANT CHANGE UP
GLUCOSE SERPL-MCNC: 86 MG/DL — SIGNIFICANT CHANGE UP (ref 70–99)
HCT VFR BLD CALC: 35.2 % — SIGNIFICANT CHANGE UP (ref 34.5–45)
HGB BLD-MCNC: 12.5 G/DL — SIGNIFICANT CHANGE UP (ref 11.5–15.5)
MAGNESIUM SERPL-MCNC: 2.3 MG/DL — SIGNIFICANT CHANGE UP (ref 1.6–2.6)
MCHC RBC-ENTMCNC: 32.6 PG — SIGNIFICANT CHANGE UP (ref 27–34)
MCHC RBC-ENTMCNC: 35.5 % — SIGNIFICANT CHANGE UP (ref 32–36)
MCV RBC AUTO: 91.9 FL — SIGNIFICANT CHANGE UP (ref 80–100)
NRBC # FLD: 0 K/UL — SIGNIFICANT CHANGE UP (ref 0–0)
PHOSPHATE SERPL-MCNC: 3.4 MG/DL — SIGNIFICANT CHANGE UP (ref 2.5–4.5)
PLATELET # BLD AUTO: 382 K/UL — SIGNIFICANT CHANGE UP (ref 150–400)
PMV BLD: 10.8 FL — SIGNIFICANT CHANGE UP (ref 7–13)
POTASSIUM SERPL-MCNC: 3.9 MMOL/L — SIGNIFICANT CHANGE UP (ref 3.5–5.3)
POTASSIUM SERPL-SCNC: 3.9 MMOL/L — SIGNIFICANT CHANGE UP (ref 3.5–5.3)
RBC # BLD: 3.83 M/UL — SIGNIFICANT CHANGE UP (ref 3.8–5.2)
RBC # FLD: 12.8 % — SIGNIFICANT CHANGE UP (ref 10.3–14.5)
SODIUM SERPL-SCNC: 139 MMOL/L — SIGNIFICANT CHANGE UP (ref 135–145)
SPECIMEN SOURCE: SIGNIFICANT CHANGE UP
SPECIMEN SOURCE: SIGNIFICANT CHANGE UP
WBC # BLD: 7.21 K/UL — SIGNIFICANT CHANGE UP (ref 3.8–10.5)
WBC # FLD AUTO: 7.21 K/UL — SIGNIFICANT CHANGE UP (ref 3.8–10.5)

## 2020-06-03 PROCEDURE — 99233 SBSQ HOSP IP/OBS HIGH 50: CPT

## 2020-06-03 PROCEDURE — 99232 SBSQ HOSP IP/OBS MODERATE 35: CPT | Mod: GC

## 2020-06-03 PROCEDURE — 95718 EEG PHYS/QHP 2-12 HR W/VEEG: CPT

## 2020-06-03 PROCEDURE — 95720 EEG PHY/QHP EA INCR W/VEEG: CPT

## 2020-06-03 RX ORDER — POLYETHYLENE GLYCOL 3350 17 G/17G
17 POWDER, FOR SOLUTION ORAL DAILY
Refills: 0 | Status: DISCONTINUED | OUTPATIENT
Start: 2020-06-03 | End: 2020-06-04

## 2020-06-03 RX ORDER — ZONISAMIDE 100 MG
300 CAPSULE ORAL AT BEDTIME
Refills: 0 | Status: DISCONTINUED | OUTPATIENT
Start: 2020-06-03 | End: 2020-06-05

## 2020-06-03 RX ADMIN — LITHIUM CARBONATE 300 MILLIGRAM(S): 300 TABLET, EXTENDED RELEASE ORAL at 11:42

## 2020-06-03 RX ADMIN — Medication 1 TABLET(S): at 18:32

## 2020-06-03 RX ADMIN — SENNA PLUS 2 TABLET(S): 8.6 TABLET ORAL at 21:56

## 2020-06-03 RX ADMIN — Medication 300 MILLIGRAM(S): at 21:57

## 2020-06-03 RX ADMIN — Medication 2000 UNIT(S): at 11:42

## 2020-06-03 RX ADMIN — LITHIUM CARBONATE 600 MILLIGRAM(S): 300 TABLET, EXTENDED RELEASE ORAL at 21:56

## 2020-06-03 RX ADMIN — CLOZAPINE 150 MILLIGRAM(S): 150 TABLET, ORALLY DISINTEGRATING ORAL at 21:58

## 2020-06-03 RX ADMIN — Medication 5 MILLIGRAM(S): at 21:58

## 2020-06-03 RX ADMIN — Medication 1 TABLET(S): at 07:14

## 2020-06-03 NOTE — EEG REPORT - NS EEG TEXT BOX
[facility]   COMPREHENSIVE EPILEPSY CENTER     [facility]: [address], [place] [address], Ph#: [phone]  [facility]: [address] [place] [address], Ph#: [phone]  [facility]: [address] [place] [address], Ph#: [phone]    Patient Name: SUSI COOPER  Age and : 25y ([birth year])  MRN #: [id number]  Location: [place] [address] A  Referring Physician: Helen Chapa    Start Date: 20 08:00  Duration: 24h     _____________________________________________________________  TECHNICAL INFORMATION    Placement and Labeling of Electrodes:  The EEG was performed utilizing 20 channels referential EEG connections (coronal over temporal over parasagittal montage) using all standard 10-20 electrode placements with EKG.  Recording was at a sampling rate of 256 samples per second per channel.  Time synchronized digital video recording was done simultaneously with EEG recording.  A low light infrared camera was used for low light recording.  Hussain and seizure detection algorithms were utilized.    _____________________________________________________________  HISTORY    Patient is a 25y old  Female who presents with a chief complaint of CC: Stuttering (29 May 2020 07:22)      MEDICATIONS  (STANDING):  amoxicillin  875 milliGRAM(s)/clavulanate 1 Tablet(s) Oral two times a day  cholecalciferol 2000 Unit(s) Oral daily  cloZAPine 150 milliGRAM(s) Oral at bedtime  lithium 300 milliGRAM(s) Oral daily  lithium 600 milliGRAM(s) Oral at bedtime  LORazepam     Tablet 2 milliGRAM(s) Oral at bedtime  polyethylene glycol 3350 17 Gram(s) Oral daily  senna 2 Tablet(s) Oral at bedtime  zonisamide 200 milliGRAM(s) Oral at bedtime      _____________________________________________________________  STUDY INTERPRETATION    Findings: The background was continuous, spontaneously variable and reactive. During wakefulness, the posterior dominant rhythm consisted of symmetric, well-modulated 7-8 Hz activity, with amplitude to 40 uV, that attenuated to eye opening.     Background Slowing:  Diffuse theta and polymorphic delta slowing.      Focal Slowing:   None were present.    Sleep Background:  There were symmetric vertex waves, sleep spindles and k-complexes.    Other Non-Epileptiform Findings:  None were present.    Interictal Epileptiform Activity:   - Occasional spike-wave discharges broad left hemisphere max left frontocentral (max F3/C3)   - Rare diffuse more generalized appearing spike wave discharges  - Occasional spike/polyspike-wave discharges in right frontotemporal (F8/T8 >>Fp2/F4)    Events:  Clinical events: None recorded.  Seizures: None recorded.    Activation Procedures:   Hyperventilation was performed and did not elicit any abnormality.  Photic stimulation was performed and did not elicit any abnormality.    Artifacts:  Intermittent myogenic and movement artifacts were noted.    ECG:  The heart rate on single channel ECG was predominantly between 50-60 BPM.    _____________________________________________________________  EEG SUMMARY/CLASSIFICATION    Unchanged abnormal EEG in the awake state.  - Occasional spike-wave discharges broad left hemisphere max left frontocentral (max F3/C3)   - Rare diffuse more generalized appearing spike wave discharges  - Occasional spike/polyspike-wave discharges in right frontotemporal (F8/T8 >>Fp2/F4)  - Mild to moderate generalized slowing.    _____________________________________________________________  EEG IMPRESSION/CLINICAL CORRELATE    Abnormal EEG study, similar to prior, consistent with:  Both features of multifocal epilepsy potentially with rapid bilateral synchrony, and generalized epilepsy are present. Clinical correlation is recommended.   No seizure seen.    Mild to moderate nonspecific diffuse or multifocal cerebral dysfunction.     _____________________________________________________________    Petar Eason MD PhD  Attending Physician, [facility] [place]

## 2020-06-03 NOTE — PROGRESS NOTE BEHAVIORAL HEALTH - NSBHFUPINTERVALHXFT_PSY_A_CORE
EEG results reviewed, pt. did not get any PRNs, standing ATivan held due to sedation.     Patient seen and evaluated. seems tired, able to engage well in interview, AAOX3, she denies significant anxiety. She remains future-oriented. Denies acute psychotic sxs, adamantly denies SI and HI.     As agreed to by patient, attempted to contact Drea Bailey NP (outpatient psych NP, [phone]); Spoke with Ms. Shepard: She stated that pt. has h/o Bipolar disorder, borderline personality disorder, OCD, anxiety. Patient has multiple inpatient admissions, last admission at [facility] 3 years ago, pt. has h/o aggression, and h/o cutting. This year patient has been doing very well. attending nursing program. She saw the pt. via telepsych. on 5/23.   Confirmed meds.  Patient has been on Clozapine 25mg qam and 300mg hs, Trazodone 200mg hs, Lithium 300mg+600mg, Ativan 2mg and adderall 10mg qam and 5mg in the afternoon for many years.  Luvox 100mg started in March, and Wellbutrin 75 mg started in Dec, 2019 for OCD.    Current clinical status and medication discussed in detail with  Drea Bailey NP

## 2020-06-03 NOTE — PROGRESS NOTE ADULT - ATTENDING COMMENTS
A 25 year old woman with a hx of bipolar disorder, borderline personality disorder, urinary incontinence, and recent cholecystectomy for acute cholecystitis who presents for stuttering, forgetfulness and abnormal gait and had abdominal pain in the ED with CT findings concerning for GB fossa fluid collection. Patient seen and evaluated at bedside. More awake today, communicative, seen ambulating.     Abdominal Pain: Present on admission, now resolved, 2/2 UTI.   - Afebrile, hemodynamically stable, with resolving leukocytosis.   - CT: GB Fossa Fluid Collection in the setting of recent Cholecystectomy.   - Appreciate Sx- likely post-operative changes. No acute surgical intervention indicated.  - GB Folla flui collection not likely infectious per Surg, hold of on AB for intraabd infection.  - UTI: UA+cultures Enterococcus  + >100,000, blood cultures negative.   - On  Augmentin 6/1-6/6.   - COVID negative.      Stuttering, Forgetfulness, Abnormal Gait: h/o of ?Absence Seizures. Likely Epilepsy.   - CT Angio Head/ Neck negative for acute pathology.  - Brain MRI w/ contrast, w/o contrast (6/1): negative for acute findings.    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  - C/w Zonisamide 200 mg qhs. Increase by 100mg every 7 days. Goal of 300mg qhs  - HELD Welbutrin and Adderall, additional med changes per psych.   - May benefit from Lamictal- to be titrated up slowly as outpatient.  - Seizure precautions, Aspiration precautions.     Bipolar Disease: History of w/ concern for Polypharmacy.   - Behavioral Health consulted, recommendations appreciated.   - D/c Wellbutrin and Adderall due to current concerns of seizures  - Decreased then discontinued Luvox on 6/1.   - c/w Clozapine 150 HS-monitor WBC's, ANCs, monitor orthostatics  - Hold Trazodone 200mg HS, hold if qtc >500. Consider d/c as decreases seizure threshold  - c/w Ativan 2mg HS-monitor for sedation, hold if RR<12  - c/w Lithium 300mg daily, 600mg HS-monitor BUN/CR. Lithium level wnl.   - QTC monitoring, WBC, Renal function monitoring.   - Utox negative, Ethanol, Salicylate, and Tylenol level wnl.     Urinary Incontinence: previously on Oxybutynin, now on Myrbetriq, f/u private Urology as OP.  Activity as tolerated.   Diet: as tolerated.  Dispo: likely home when acute issues resolve, 1-2 days. Neur/Psych recs prior to discharge. A 25 year old woman with a hx of bipolar disorder, borderline personality disorder, urinary incontinence, and recent cholecystectomy for acute cholecystitis who presents for stuttering, forgetfulness and abnormal gait and had abdominal pain in the ED with CT findings concerning for GB fossa fluid collection. Patient seen and evaluated at bedside. More awake today, communicative, seen ambulating.     Abdominal Pain: Present on admission, now resolved, 2/2 UTI.   - Afebrile, hemodynamically stable, with resolving leukocytosis.   - CT: GB Fossa Fluid Collection in the setting of recent Cholecystectomy.   - Appreciate Sx- likely post-operative changes. No acute surgical intervention indicated.  - GB Folla flui collection not likely infectious per Surg, hold of on AB for intraabd infection.  - UTI: UA+cultures Enterococcus  + >100,000, blood cultures negative.   - On  Augmentin 6/1-6/6.   - COVID negative.      Stuttering, Forgetfulness, Abnormal Gait: h/o of ?Absence Seizures. Likely Epilepsy.   - CT Angio Head/ Neck negative for acute pathology.  - Brain MRI w/ contrast, w/o contrast (6/1): negative for acute findings.    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  - C/w Zonisamide 200 mg qhs. Increase by 100mg every 7 days. Goal of 300mg qhs  - HELD Welbutrin and Adderall, additional med changes per psych.   - May benefit from Lamictal- to be titrated up slowly as outpatient.  - Seizure precautions, Aspiration precautions.     Bipolar Disease: History of w/ concern for Polypharmacy.   - Behavioral Health consulted, recommendations appreciated.   - D/c Wellbutrin and Adderall due to current concerns of seizures  - Decreased then discontinued Luvox on 6/1.   - c/w Clozapine 150 HS-monitor WBC's, ANCs, monitor orthostatics  - Hold Trazodone 200mg HS, hold if qtc >500. Consider d/c as decreases seizure threshold  - c/w Ativan 2mg HS-monitor for sedation, hold if RR<12  - c/w Lithium 300mg daily, 600mg HS-monitor BUN/CR. Lithium level wnl.   - QTC monitoring, WBC, Renal function monitoring.   - Utox negative, Ethanol, Salicylate, and Tylenol level wnl.     Urinary Incontinence: previously on Oxybutynin, now on Myrbetriq, f/u private Urology as OP.  Activity as tolerated.   Diet: as tolerated.  Dispo: DW Psych Attending Dr Bryan- advise to monitor inpt for one more day   Awaiting Neuro recs re- EEG report and AED regimen   Likely home in 1-2 days pending Neuro/Pysch clearance A 25 year old woman with a hx of bipolar disorder, borderline personality disorder, urinary incontinence, and recent cholecystectomy for acute cholecystitis who presents for stuttering, forgetfulness and abnormal gait and had abdominal pain in the ED with CT findings concerning for GB fossa fluid collection. Patient seen and evaluated at bedside. More awake today, communicative, still with occasional word finding difficulty    Abdominal Pain: Present on admission, now resolved, 2/2 UTI.   - Afebrile, hemodynamically stable, with resolving leukocytosis.   - CT: GB Fossa Fluid Collection in the setting of recent Cholecystectomy.   - Appreciate Sx- likely post-operative changes. No acute surgical intervention indicated.  - GB Folla flui collection not likely infectious per Surg, hold of on AB for intraabd infection.  - UTI: UA+cultures Enterococcus  + >100,000, blood cultures negative.   - On  Augmentin 6/1-6/6.   - COVID negative.      Stuttering, Forgetfulness, Abnormal Gait: h/o of ?Absence Seizures. Likely Epilepsy.   - CT Angio Head/ Neck negative for acute pathology.  - Brain MRI w/ contrast, w/o contrast (6/1): negative for acute findings.    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  - C/w Zonisamide 200 mg qhs. Increase by 100mg every 7 days. Goal of 300mg qhs  - HELD Welbutrin and Adderall, additional med changes per psych.   - May benefit from Lamictal- to be titrated up slowly as outpatient.  - Seizure precautions, Aspiration precautions.     Bipolar Disease: History of w/ concern for Polypharmacy.   - Behavioral Health consulted, recommendations appreciated.   - D/c Wellbutrin and Adderall due to current concerns of seizures  - Decreased then discontinued Luvox on 6/1.   - c/w Clozapine 150 HS-monitor WBC's, ANCs, monitor orthostatics  - Hold Trazodone 200mg HS, hold if qtc >500. Consider d/c as decreases seizure threshold  - c/w Ativan 2mg HS-monitor for sedation, hold if RR<12  - c/w Lithium 300mg daily, 600mg HS-monitor BUN/CR. Lithium level wnl.   - QTC monitoring, WBC, Renal function monitoring.   - Utox negative, Ethanol, Salicylate, and Tylenol level wnl.     Urinary Incontinence: previously on Oxybutynin, now on Myrbetriq, f/u private Urology as OP.  Activity as tolerated.   Diet: as tolerated.  Dispo: DW Psych Attending Dr Bryan- advise to monitor inpt for one more day   Awaiting Neuro recs re- EEG report and AED regimen   Likely home in 1-2 days pending Neuro/Pysch clearance  Updated pts mother- she is very concerned about pts condition- doesn't want the pt discharged until all  tests have been completed. Requesting Nutrition consult

## 2020-06-03 NOTE — PROGRESS NOTE ADULT - PROBLEM SELECTOR PLAN 5
Improve score 0.  Low risk for VTE. -d/c Wellbutrin and Adderall due to current concerns of seizures  -Off Luvox  -c/w Clozapine 150mg qHS w/hold parameters, monitor WBC's, ANCs, monitor orthostatics  -c/w Ativan 2mg HS w/nightly bedchecks, monitor for sedation, hold if RR<12  -c/w Lithium 300mg daily, 600mg HS-monitor BUN/CR  -Ativan 2mg PO/IM/IV Q6H PRN for agitation, monitor for sedation, hold if RR<12

## 2020-06-03 NOTE — PROGRESS NOTE BEHAVIORAL HEALTH - NSBHCONSULTFOLLOWDETAILS_PSY_A_CORE FT
Medication changes, seizure activity on EEG  Needs collateral from outpt (left VM, pending callback)

## 2020-06-03 NOTE — PROGRESS NOTE BEHAVIORAL HEALTH - NSBHCONSULTRECOMMENDOTHER_PSY_A_CORE FT
f/u Drea Bailey NP (outpatient psych NP, [phone])  @ [facility] on 6/8/20      [facility] [phone])

## 2020-06-03 NOTE — PROGRESS NOTE ADULT - PROBLEM SELECTOR PLAN 2
Patient's signs and symptoms may be explained by polypharmacy and her recent cholecystectomy  -Per psych, can c/w PM clozapine. No need to start anew unless pt misses 2 doses on consecutive days.   - Will have NF assess pt nightly and if awake/alert will give PM ativan, otherwise will hold  -I-stop in chart 5/29  -Meds as below

## 2020-06-03 NOTE — CHART NOTE - NSCHARTNOTEFT_GEN_A_CORE
MRI brain reviewed and shows no acute abnormalities. Titrate up zonisamide to 300 mg qhs as long as patient is tolerating and she can be continued on this dose.     Please have patient follow-up with epilepsy at [address] [place] [address] ([phone]). No neurological contraindications to discharge.           < from: MR Brain-Seizure, Epilepsy w/wo IV Cont (06.01.20 @ 10:01) >    Impression  MRI Brain without and with contrast:    1. Unremarkable hippocampal formations; no evidence of mesial temporal sclerosis.  2. No gross cortical dysplasia or migrational anomaly, heterotopia or structural abnormality.  3. No focal or diffuse abnormal signal intensity, space-occupying mass lesion, focal or diffuse atrophy or encephalomalacia.  4. No abnormal intracranial enhancement. Small incidental developmental venous anomaly of the left postcentral gyrus.    < end of copied text > MRI brain reviewed and shows no significant findings. Titrate up zonisamide to 300 mg qhs as long as patient is tolerating and she can be continued on this dose. Would recommend maintaining patient OFF of standing home ativan, welbutrin, and adderral which she was already taken off of during this admission by psychiatry.     Please have patient follow-up with epilepsy at [address] [place] [address] ([phone]). No neurological contraindications to discharge.           < from: MR Brain-Seizure, Epilepsy w/wo IV Cont (06.01.20 @ 10:01) >    Impression  MRI Brain without and with contrast:    1. Unremarkable hippocampal formations; no evidence of mesial temporal sclerosis.  2. No gross cortical dysplasia or migrational anomaly, heterotopia or structural abnormality.  3. No focal or diffuse abnormal signal intensity, space-occupying mass lesion, focal or diffuse atrophy or encephalomalacia.  4. No abnormal intracranial enhancement. Small incidental developmental venous anomaly of the left postcentral gyrus.    < end of copied text > MRI brain reviewed and shows no significant findings. Titrate up zonisamide to 300 mg qhs as long as patient is tolerating and she can be continued on this dose. Would recommend maintaining patient OFF of standing home ativan, welbutrin, trazodone  2. No gross cortical dysplasia or migrational anomaly, heterotopia or structural abnormality.  3. No focal or diffuse abnormal signal intensity, space-occupying mass lesion, focal or diffuse atrophy or encephalomalacia.  4. No abnormal intracranial enhancement. Small incidental developmental venous anomaly of the left postcentral gyrus.    < end of copied text > MRI brain reviewed and shows no significant findings. Titrate up zonisamide to 300 mg qhs as long as patient is tolerating and she can be continued on this dose. Would recommend maintaining patient OFF of standing home ativan, welbutrin, trazodone.    Patient can follow-up with neurology at [address] [place] [address] ([phone]). No neurological contraindications to discharge.     2. No gross cortical dysplasia or migrational anomaly, heterotopia or structural abnormality.  3. No focal or diffuse abnormal signal intensity, space-occupying mass lesion, focal or diffuse atrophy or encephalomalacia.  4. No abnormal intracranial enhancement. Small incidental developmental venous anomaly of the left postcentral gyrus.    < end of copied text >

## 2020-06-03 NOTE — PROGRESS NOTE ADULT - PROBLEM SELECTOR PLAN 1
History obtained by neuro significant for possible absence seizures  -Spot EEG showing areas of epileptiform activity however no seizures   -Per vEEG: single focus of hyperexcitability  -C/w zonisamide 200 mg qhs. Increase by 100mg every 7 days. Goal of 300mg qhs  -hold Welbutrin and Adderall, additional med changes per psych  -may benefit from Lamictal. would have to be titrated up slowly as outpatient.  -q4h neurochecks Spot EEG showing areas of epileptiform activity however no seizures; Per vEEG: single focus of hyperexcitability. Per neuro, pt likely has generalized epilepsy  -Increase to zonisamide 300mg qhs tonight per neuro  -Pt has been off nighttime ativan for 2d. Despite no taper, did not have any events on vEEG. Per neuro, would prefer to stop ativan altogether at this point.  -hold Welbutrin and Adderall as they lower seizure threshold, additional med changes per psych  -may benefit from Lamictal. would have to be titrated up slowly as outpatient.  -q4h neurochecks Spot EEG showing areas of epileptiform activity however no seizures; Per vEEG: single focus of hyperexcitability. 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 If stable on this dose, she is clear for discharge from neuro standpoint  -Pt has been off nighttime ativan for 2d. Despite no taper, did not have any events on vEEG. Per neuro, would prefer to stop ativan altogether at this point.  -hold Welbutrin and Adderall as they lower seizure threshold, additional med changes per psych  -may benefit from Lamictal. would have to be titrated up slowly as outpatient.  -q4h neurochecks

## 2020-06-03 NOTE — PROGRESS NOTE BEHAVIORAL HEALTH - SUMMARY
26 yo , single, female residing with roommate with PMHx urinary incontinence, PSHx cholecystectomy, as per chart, PPHx bipolar disorder with psychotic features, SIB BPD, MDD, dysthymic disorder, presents to [facility] for c/o forgetfulness, twitching/shaking, eye droop and abnormal gait for last month. [facility] psychiatry consulted for medication management.    Today's exam appears similar though more fatigued than yesterday, though she was easily aroused and cooperative with exam, continuing to have difficulty remembering recent communication (eg medication), possibly related to seizure activity, less likely due to medication.   Psych CL discontinued wellbutrin and adderall and decreased clozapine as they can lower seizure threshold, also advised holding lorazepam when sedated    5/30 - Today's lithium level is therepeutic.  In the setting of concern for possible epileptiform activity would recommend the following: (will lower clozapine, will stop trazodone, will ask for another lithium level tomorrow, address medical/neurologic issues)    5/31 - pt lethargic, unable to engage in assessment, likely multifactorial (discontinuation of stimulating agents (adderall/wellbutrin), UTI/delirium). Lithium level within therapeutic window this AM. See recs below     6/1 - much more alert/awake since yesterday's lethargy, possibly related to adjustment s/p holding stimulant, bupropion, trazodone,  possibly related to improvement in sz activity if improved in response to med changes.     6/2 - more fatigued than yesterday's exam though easily aroused and cooperative, still forgetting information communicated. Received last night's clozapine since BP was wnl, lorazepam held due to lethargy. EEG suggestive of seizure activity. Pending discussion with outpatient psych NP (left VM for her).    6/3; EEG results reviewed, pt. did not get any PRNs, standing ATivan held due to sedation. Patient seen and evaluated. seems tired, able to engage well in interview, AAOX3, she denies significant anxiety. She remains future-oriented. Denies acute psychotic sxs, adamantly denies SI and HI.     PLAN:  -Continue to hold adderall/wellbutrin as these may lower seizure threshold  -Continue to hold trazodone 200mg qhs due to sedation  -Continued to hold fluvoxamine (end of taper)  -Continue Clozapine 150mg qhs, would not increase yet given EEG findings  -Monitor ANC, orthostatics   -c/w Ativan 2mg HS-monitor for sedation, hold if RR<12  -c/w Lithium 300mg daily, 600mg HS-monitor BUN/CR - level on 5/31 0.89  -Ativan 2mg PO/IM/IV Q6H PRN -agitation, monitor for sedation, hold if RR<12  -Monitor EKG qtc interval  -Obtain B-HCG      Case discussed with Dr. Chapa    Will follow

## 2020-06-03 NOTE — PROGRESS NOTE ADULT - PROBLEM SELECTOR PLAN 4
-d/c Wellbutrin and Adderall due to current concerns of seizures  -Off Luvox  -c/w Clozapine 150mg qHS w/hold parameters, monitor WBC's, ANCs, monitor orthostatics  -c/w Ativan 2mg HS w/nightly bedchecks, monitor for sedation, hold if RR<12  -c/w Lithium 300mg daily, 600mg HS-monitor BUN/CR  -Ativan 2mg PO/IM/IV Q6H PRN for agitation, monitor for sedation, hold if RR<12 No BM since 5/28  - Bowel regimen  - Dulcolax standing

## 2020-06-03 NOTE — PROGRESS NOTE ADULT - SUBJECTIVE AND OBJECTIVE BOX
Author:   Pernell Amaya MD  Internal Medicine, PGY1  [phone]    Patient:  SUSI COOPER  [id number]    Progress Note    Interval events: No acute events. Did not receive ativan last night for sedation.  Pertinent ROS (if any):      Administered:  cloZAPine: 150 milliGRAM(s) Oral (06-02 @ 22:26)  zonisamide: 200 milliGRAM(s) Oral (06-02 @ 22:25)  senna: 2 Tablet(s) Oral (06-02 @ 22:25)  lithium: 600 milliGRAM(s) Oral (06-02 @ 22:24)        OBJECTIVE:    06-02 @ 07:01  -  06-03 @ 07:00  --------------------------------------------------------  IN: 4200 mL / OUT: 825 mL / NET: 3375 mL      CAPILLARY BLOOD GLUCOSE            VITALS:  T(F): 98.3 (06-02-20 @ 22:17), Max: 98.5 (06-02-20 @ 13:18)  HR: 88 (06-02-20 @ 22:17) (86 - 88)  BP: 92/64 (06-02-20 @ 22:17) (90/60 - 99/67)  RR: 16 (06-02-20 @ 22:17) (16 - 16)  SpO2: 99% (06-02-20 @ 22:17) (98% - 99%)    PHYSICAL EXAM:  GENERAL: NAD, lying in bed comfortably  HEAD:  Atraumatic, Normocephalic  EYES: EOMI, PERRLA, conjunctiva and sclera clear  ENT: Moist mucous membranes  NECK: Supple, No JVD  CHEST/LUNG: Clear to auscultation bilaterally; No rales, rhonchi, wheezing, or rubs. Unlabored respirations  HEART: Regular rate and rhythm; No murmurs, rubs, or gallops  ABDOMEN: Bowel sounds present; Soft, Nontender, Nondistended. No hepatomegaly  EXTREMITIES:  2+ Peripheral Pulses, brisk capillary refill. No clubbing, cyanosis, or edema  NERVOUS SYSTEM:  Alert & Oriented X3, speech clear. No deficits   MSK: FROM all 4 extremities, full and equal strength  SKIN: No rashes or lesions    HOSPITAL MEDICATIONS:  Standing Meds:  amoxicillin  875 milliGRAM(s)/clavulanate 1 Tablet(s) Oral two times a day  cholecalciferol 2000 Unit(s) Oral daily  cloZAPine 150 milliGRAM(s) Oral at bedtime  lithium 300 milliGRAM(s) Oral daily  lithium 600 milliGRAM(s) Oral at bedtime  LORazepam     Tablet 2 milliGRAM(s) Oral at bedtime  polyethylene glycol 3350 17 Gram(s) Oral daily  senna 2 Tablet(s) Oral at bedtime  zonisamide 200 milliGRAM(s) Oral at bedtime      PRN Meds:  bisacodyl Suppository 10 milliGRAM(s) Rectal daily PRN  LORazepam     Tablet 2 milliGRAM(s) Oral every 6 hours PRN  ondansetron Injectable 4 milliGRAM(s) IV Push three times a day PRN      LABS:        MICROBIOLOGY:       RADIOLOGY:  [ ] Reviewed and interpreted by me    EKG: Author:   Pernell Amaya MD  Internal Medicine, PGY1  [phone]    Patient:  SUSI COOPER  [id number]    Progress Note    Interval events: No acute events. Did not receive ativan last night for sedation.  Pertinent ROS (if any):      Administered:  cloZAPine: 150 milliGRAM(s) Oral (06-02 @ 22:26)  zonisamide: 200 milliGRAM(s) Oral (06-02 @ 22:25)  senna: 2 Tablet(s) Oral (06-02 @ 22:25)  lithium: 600 milliGRAM(s) Oral (06-02 @ 22:24)        OBJECTIVE:    06-02 @ 07:01  -  06-03 @ 07:00  --------------------------------------------------------  IN: 4200 mL / OUT: 825 mL / NET: 3375 mL      CAPILLARY BLOOD GLUCOSE            VITALS:  T(F): 98.3 (06-02-20 @ 22:17), Max: 98.5 (06-02-20 @ 13:18)  HR: 88 (06-02-20 @ 22:17) (86 - 88)  BP: 92/64 (06-02-20 @ 22:17) (90/60 - 99/67)  RR: 16 (06-02-20 @ 22:17) (16 - 16)  SpO2: 99% (06-02-20 @ 22:17) (98% - 99%)    PHYSICAL EXAM:  GENERAL: NAD, lying in bed comfortably  HEAD:  Atraumatic, Normocephalic  EYES: EOMI, PERRLA, conjunctiva and sclera clear  ENT: Moist mucous membranes  NECK: Supple, No JVD  CHEST/LUNG: Clear to auscultation bilaterally; No rales, rhonchi, wheezing, or rubs. Unlabored respirations  HEART: Regular rate and rhythm; No murmurs, rubs, or gallops  ABDOMEN: Bowel sounds present; Soft, Nontender, Nondistended. No hepatomegaly  EXTREMITIES:  2+ Peripheral Pulses, brisk capillary refill. No clubbing, cyanosis, or edema  NERVOUS SYSTEM:  Alert & Oriented X3, speech clear. No deficits   MSK: FROM all 4 extremities, full and equal strength  SKIN: No rashes or lesions    HOSPITAL MEDICATIONS:  Standing Meds:  amoxicillin  875 milliGRAM(s)/clavulanate 1 Tablet(s) Oral two times a day  cholecalciferol 2000 Unit(s) Oral daily  cloZAPine 150 milliGRAM(s) Oral at bedtime  lithium 300 milliGRAM(s) Oral daily  lithium 600 milliGRAM(s) Oral at bedtime  LORazepam     Tablet 2 milliGRAM(s) Oral at bedtime  polyethylene glycol 3350 17 Gram(s) Oral daily  senna 2 Tablet(s) Oral at bedtime  zonisamide 200 milliGRAM(s) Oral at bedtime      PRN Meds:  bisacodyl Suppository 10 milliGRAM(s) Rectal daily PRN  LORazepam     Tablet 2 milliGRAM(s) Oral every 6 hours PRN  ondansetron Injectable 4 milliGRAM(s) IV Push three times a day PRN      LABS:  CBC 06-03-20 @ 06:30                        12.5   7.21  )-----------( 382                   35.2       Hgb trend: 12.5 <-- , 11.8 <-- , 11.7 <--   WBC trend: 7.21 <-- , 8.52 <-- , 9.72 <--       CMP 06-03-20 @ 06:30    139  |  107  |  15  ----------------------------<  86  3.9   |  19<L>  |  0.61    Ca    9.8      06-03-20 @ 06:30  Phos  3.4     06-03  Mg     2.3     06-03        Serum Cr trend: 0.61 <-- , 0.70 <-- , 0.72 <--             MICROBIOLOGY:       RADIOLOGY:  [ ] Reviewed and interpreted by me    EKG:

## 2020-06-04 LAB
ANION GAP SERPL CALC-SCNC: 13 MMO/L — SIGNIFICANT CHANGE UP (ref 7–14)
BUN SERPL-MCNC: 16 MG/DL — SIGNIFICANT CHANGE UP (ref 7–23)
CALCIUM SERPL-MCNC: 9.3 MG/DL — SIGNIFICANT CHANGE UP (ref 8.4–10.5)
CHLORIDE SERPL-SCNC: 107 MMOL/L — SIGNIFICANT CHANGE UP (ref 98–107)
CO2 SERPL-SCNC: 19 MMOL/L — LOW (ref 22–31)
CREAT SERPL-MCNC: 0.69 MG/DL — SIGNIFICANT CHANGE UP (ref 0.5–1.3)
GLUCOSE SERPL-MCNC: 94 MG/DL — SIGNIFICANT CHANGE UP (ref 70–99)
HCT VFR BLD CALC: 34.1 % — LOW (ref 34.5–45)
HGB BLD-MCNC: 11.9 G/DL — SIGNIFICANT CHANGE UP (ref 11.5–15.5)
MAGNESIUM SERPL-MCNC: 2.2 MG/DL — SIGNIFICANT CHANGE UP (ref 1.6–2.6)
MCHC RBC-ENTMCNC: 31.4 PG — SIGNIFICANT CHANGE UP (ref 27–34)
MCHC RBC-ENTMCNC: 34.9 % — SIGNIFICANT CHANGE UP (ref 32–36)
MCV RBC AUTO: 90 FL — SIGNIFICANT CHANGE UP (ref 80–100)
NRBC # FLD: 0 K/UL — SIGNIFICANT CHANGE UP (ref 0–0)
PHOSPHATE SERPL-MCNC: 3.2 MG/DL — SIGNIFICANT CHANGE UP (ref 2.5–4.5)
PLATELET # BLD AUTO: 363 K/UL — SIGNIFICANT CHANGE UP (ref 150–400)
PMV BLD: 10.5 FL — SIGNIFICANT CHANGE UP (ref 7–13)
POTASSIUM SERPL-MCNC: 3.7 MMOL/L — SIGNIFICANT CHANGE UP (ref 3.5–5.3)
POTASSIUM SERPL-SCNC: 3.7 MMOL/L — SIGNIFICANT CHANGE UP (ref 3.5–5.3)
RBC # BLD: 3.79 M/UL — LOW (ref 3.8–5.2)
RBC # FLD: 12.8 % — SIGNIFICANT CHANGE UP (ref 10.3–14.5)
SODIUM SERPL-SCNC: 139 MMOL/L — SIGNIFICANT CHANGE UP (ref 135–145)
WBC # BLD: 7.79 K/UL — SIGNIFICANT CHANGE UP (ref 3.8–10.5)
WBC # FLD AUTO: 7.79 K/UL — SIGNIFICANT CHANGE UP (ref 3.8–10.5)

## 2020-06-04 PROCEDURE — 99232 SBSQ HOSP IP/OBS MODERATE 35: CPT | Mod: GC

## 2020-06-04 PROCEDURE — 99233 SBSQ HOSP IP/OBS HIGH 50: CPT

## 2020-06-04 RX ORDER — POLYETHYLENE GLYCOL 3350 17 G/17G
17 POWDER, FOR SOLUTION ORAL
Refills: 0 | Status: DISCONTINUED | OUTPATIENT
Start: 2020-06-04 | End: 2020-06-05

## 2020-06-04 RX ORDER — SODIUM CHLORIDE 9 MG/ML
1000 INJECTION, SOLUTION INTRAVENOUS ONCE
Refills: 0 | Status: COMPLETED | OUTPATIENT
Start: 2020-06-04 | End: 2020-06-04

## 2020-06-04 RX ADMIN — Medication 300 MILLIGRAM(S): at 21:56

## 2020-06-04 RX ADMIN — SENNA PLUS 2 TABLET(S): 8.6 TABLET ORAL at 21:56

## 2020-06-04 RX ADMIN — LITHIUM CARBONATE 600 MILLIGRAM(S): 300 TABLET, EXTENDED RELEASE ORAL at 21:56

## 2020-06-04 RX ADMIN — Medication 1 TABLET(S): at 17:56

## 2020-06-04 RX ADMIN — Medication 1 TABLET(S): at 05:48

## 2020-06-04 RX ADMIN — SODIUM CHLORIDE 1000 MILLILITER(S): 9 INJECTION, SOLUTION INTRAVENOUS at 21:56

## 2020-06-04 RX ADMIN — Medication 2000 UNIT(S): at 12:23

## 2020-06-04 RX ADMIN — Medication 5 MILLIGRAM(S): at 12:26

## 2020-06-04 RX ADMIN — LITHIUM CARBONATE 300 MILLIGRAM(S): 300 TABLET, EXTENDED RELEASE ORAL at 12:23

## 2020-06-04 RX ADMIN — POLYETHYLENE GLYCOL 3350 17 GRAM(S): 17 POWDER, FOR SOLUTION ORAL at 17:56

## 2020-06-04 NOTE — PROGRESS NOTE BEHAVIORAL HEALTH - NSBHCHARTREVIEWLAB_PSY_A_CORE FT
11.9   7.79  )-----------( 363      ( 04 Jun 2020 05:49 )             34.1       06-04    139  |  107  |  16  ----------------------------<  94  3.7   |  19<L>  |  0.69    Ca    9.3      04 Jun 2020 05:49  Phos  3.2     06-04  Mg     2.2     06-04

## 2020-06-04 NOTE — PROGRESS NOTE BEHAVIORAL HEALTH - NSBHCONSULTRECOMMENDOTHER_PSY_A_CORE FT
f/u Drea Bailey NP (outpatient psych NP, [phone])  @ [facility] on 6/8/20      [facility] [phone](9a-7p) f/u Drea Bailey NP (outpatient psych NP, [phone])  @ [facility] on 6/8/20      Addiotional referrals;  [facility] [phone](9a-7p)  [facility]: [phone]

## 2020-06-04 NOTE — PROGRESS NOTE ADULT - PROBLEM SELECTOR PLAN 1
Spot EEG showing areas of epileptiform activity however no seizures; Per vEEG: single focus of hyperexcitability. 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 If stable on this dose, she is clear for discharge from neuro standpoint  -Pt has been off nighttime ativan for 2d. Despite no taper, did not have any events on vEEG. Per neuro, would prefer to stop ativan altogether at this point, and psych is in agreement  -hold Welbutrin and Adderall as they lower seizure threshold, additional med changes per psych  -may benefit from Lamictal. would have to be titrated up slowly as outpatient.  -q4h neurochecks Spot EEG showing areas of epileptiform activity however no seizures; Per vEEG: single focus of hyperexcitability. 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 She is clear for discharge from neuro standpoint  -Off nighttime ativan  -hold Welbutrin and Adderall as they lower seizure threshold, additional med changes per psych  -q4h neurochecks

## 2020-06-04 NOTE — PROGRESS NOTE ADULT - ATTENDING COMMENTS
A 25 year old woman with a hx of bipolar disorder, borderline personality disorder, urinary incontinence, and recent cholecystectomy for acute cholecystitis who presents for stuttering, forgetfulness and abnormal gait and had abdominal pain in the ED with CT findings concerning for GB fossa fluid collection. Patient seen and evaluated at bedside. More awake today, communicative, still with occasional word finding difficulty. Low mood.     Abdominal Pain: Present on admission, now resolved, 2/2 UTI.   - Afebrile, hemodynamically stable, with resolving leukocytosis.   - CT: GB Fossa Fluid Collection in the setting of recent Cholecystectomy.   - Appreciate Sx- likely post-operative changes. No acute surgical intervention indicated.  - GB Folla fluid collection not likely infectious per Surg, hold of on AB for intraabd infection.  - UTI: UA+cultures Enterococcus  + >100,000, blood cultures negative.   - On  Augmentin 6/1-6/6.   - COVID negative.      Stuttering, Forgetfulness, Abnormal Gait: h/o of ?Absence Seizures. Likely Epilepsy.   - CT Angio Head/ Neck negative for acute pathology.  - Brain MRI w/ contrast, w/o contrast (6/1): negative for acute findings.    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  - C/w Zonisamide 200 mg qhs. Increase by 100mg every 7 days. Goal of 300mg qhs  - HELD Welbutrin and Adderall, additional med changes per psych.   - May benefit from Lamictal- to be titrated up slowly as outpatient.  - Seizure precautions, Aspiration precautions.     Bipolar Disease: History of w/ concern for Polypharmacy.   - Behavioral Health consulted, recommendations appreciated.   - D/c Wellbutrin and Adderall due to current concerns of seizures  - Decreased then discontinued Luvox on 6/1.   - c/w Clozapine 150 HS-monitor WBC's, ANCs, monitor orthostatics  - Hold Trazodone 200mg HS, hold if qtc >500. Consider d/c as decreases seizure threshold  - c/w Ativan 2mg HS-monitor for sedation, hold if RR<12  - c/w Lithium 300mg daily, 600mg HS-monitor BUN/CR. Lithium level wnl.   - QTC monitoring, WBC, Renal function monitoring.   - Utox negative, Ethanol, Salicylate, and Tylenol level wnl.   - On 6/4, reports some passive suicide ideation-  recommend voluntary IP [facility].   - Patient and patients mother both declining IP [facility], requesting f/u with outpatient [facility].    Urinary Incontinence: previously on Oxybutynin, now on Myrbetriq, f/u private Urology as OP.  Activity as tolerated.   Diet: as tolerated.  Dispo: [facility] Psych Attending Dr Bryan- advise to monitor inpt for one more day. Possible dc 6/5.    Awaiting Neuro recs re- EEG report and AED regimen   Likely home in 1-2 days pending Neuro/Pysch clearance  Updated pts mother Veronica James on 6/4- she is very concerned about pts condition- doesn't want the pt discharged until all  tests have been completed. Provided her an entire update on entire hospitalization, diagnosis, and treatment plans. Mother now feels more reassured. States she DOES NOT Want inpatient admission at [facility]. Will discuss with Behavioral health team. Requesting Nutrition consult. Will attempt to finalize recs from Neuro/Behavioral health prior to discharge.

## 2020-06-04 NOTE — DIETITIAN INITIAL EVALUATION ADULT. - PERTINENT MEDS FT
MEDICATIONS  (STANDING):  amoxicillin  875 milliGRAM(s)/clavulanate 1 Tablet(s) Oral two times a day  cholecalciferol 2000 Unit(s) Oral daily  cloZAPine 150 milliGRAM(s) Oral at bedtime  lithium 300 milliGRAM(s) Oral daily  lithium 600 milliGRAM(s) Oral at bedtime  polyethylene glycol 3350 17 Gram(s) Oral two times a day  senna 2 Tablet(s) Oral at bedtime  zonisamide 300 milliGRAM(s) Oral at bedtime    MEDICATIONS  (PRN):  bisacodyl 5 milliGRAM(s) Oral every 12 hours PRN Constipation  LORazepam     Tablet 2 milliGRAM(s) Oral every 6 hours PRN Agitation  ondansetron Injectable 4 milliGRAM(s) IV Push three times a day PRN Nausea and/or Vomiting

## 2020-06-04 NOTE — DIETITIAN INITIAL EVALUATION ADULT. - CONTINUE CURRENT NUTRITION CARE PLAN
1. Continue current diet order, which remains appropriate at this time. 2. Food and Nutrition Department will provide H[employer]el Vital Shake x 1 (520Kcal, 22gpro) for added calories and protein 3. Monitor weights, labs, BM's, skin integrity, p.o. intake. 4. Please Encourage po intake, assist with meals and menu selections, provide alternatives PRN.

## 2020-06-04 NOTE — PROGRESS NOTE ADULT - PROBLEM SELECTOR PLAN 2
Patient's signs and symptoms may be explained by polypharmacy and her recent cholecystectomy  -C/w clozapine  -Standing ativan stopped  -I-stop in chart 5/29  -Meds as below

## 2020-06-04 NOTE — PROGRESS NOTE BEHAVIORAL HEALTH - RISK ASSESSMENT
Per collateral had been doing quite well prior to this hospitalization. Reports anxiety and some residual depression but no SI, no AH/VH, no other persistent psychotic symptoms. Reports medication compliance.    Will follow Per collateral had been doing quite well prior to this hospitalization. Reports anxiety and some residual depression but no SI, no AH/VH, no other persistent psychotic symptoms. Reports medication compliance.      Patient is not at acute risk of harm to self or others at this time, does not meet criteria for involuntary psychiatric admission. Per collateral had been doing quite well prior to this hospitalization. Reports anxiety and some residual depression but no SI, no AH/VH, no other persistent psychotic symptoms. Reports medication compliance.    Patient is not at acute risk of harm to self or others at this time, does not meet criteria for involuntary psychiatric admission.

## 2020-06-04 NOTE — PROGRESS NOTE ADULT - PROBLEM SELECTOR PLAN 7
1.  Name of PCP:  2.  PCP Contacted on Admission: [ ] Y    [x] N  Night admission  3.  PCP contacted at Discharge: [ ] Y    [ ] N    [ ] N/A  4.  Post-Discharge Appointment Date and Location:  5.  Summary of Handoff given to PCP: Psych recommending voluntary inpatient [facility] admission, but pt is ambivalent and mother refusing. Extensive discussion with mother and pt today by [facility] attending Dr. Avila and psych team. Will have nutrition see pt as requested by mother. Aiming for discharge home tomorrow with f/u with [facility] as outpt    1.  Name of PCP:  2.  PCP Contacted on Admission: [ ] Y    [x] N  Night admission  3.  PCP contacted at Discharge: [ ] Y    [ ] N    [ ] N/A  4.  Post-Discharge Appointment Date and Location:  5.  Summary of Handoff given to PCP:

## 2020-06-04 NOTE — DIETITIAN INITIAL EVALUATION ADULT. - PERTINENT LABORATORY DATA
06-04 Na139 mmol/L Glu 94 mg/dL K+ 3.7 mmol/L Cr  0.69 mg/dL BUN 16 mg/dL 06-04 Phos 3.2 mg/dL 05-29 Alb 3.4 g/dL

## 2020-06-04 NOTE — PROGRESS NOTE BEHAVIORAL HEALTH - SUMMARY
24 yo , single, female residing with roommate with PMHx urinary incontinence, PSHx cholecystectomy, as per chart, PPHx bipolar disorder with psychotic features, SIB BPD, MDD, dysthymic disorder, presents to [facility] for c/o forgetfulness, twitching/shaking, eye droop and abnormal gait for last month. [facility] psychiatry consulted for medication management.    Psych [facility] discontinued wellbutrin and adderall and decreased clozapine as they can lower seizure threshold, clozapine now appropriate for gradual titration with care to avoid seizure precipitation. Agree with neuro rec to discontinue lorazepam for possible seizure destabilization.     5/30 - Today's lithium level is therepeutic.  In the setting of concern for possible epileptiform activity would recommend the following: (will lower clozapine, will stop trazodone, will ask for another lithium level tomorrow, address medical/neurologic issues)    5/31 - pt lethargic, unable to engage in assessment, likely multifactorial (discontinuation of stimulating agents (adderall/wellbutrin), UTI/delirium). Lithium level within therapeutic window this AM. See recs below     6/1 - much more alert/awake since yesterday's lethargy, possibly related to adjustment s/p holding stimulant, bupropion, trazodone,  possibly related to improvement in sz activity if improved in response to med changes.     6/2 - more fatigued than yesterday's exam though easily aroused and cooperative, still forgetting information communicated. Received last night's clozapine since BP was wnl, lorazepam held due to lethargy. EEG suggestive of seizure activity. Pending discussion with outpatient psych NP (left VM for her).    6/3; EEG results reviewed, pt. did not get any PRNs, standing ATivan held due to sedation. Patient seen and evaluated. seems tired, able to engage well in interview, AAOX3, she denies significant anxiety. She remains future-oriented. Denies acute psychotic sxs, adamantly denies SI and HI.     Today's exam appears more alert though newly sad/depressed since yesterday, though she was easily aroused and cooperative with exam, improved recall of recent communication. More accepting of lorazepam being held though expressing distress at having it and the trazodone held not because she can't sleep rather because she prefers to feel somewhat numb. Discussed possibility of voluntary psychiatric hospitalization, pt expressing ambivalence, per pt's request discussed with mother who is adamantly opposed, working with both to plan disposition.     -Recommend voluntary psychiatric hospitalization at [facility] for med optimization and acute mood changes in setting of significant med changes this hospitalization which may continue to worsen though pt deferring to mother who is adamantly opposed and she does not presently meet criteria for involuntary psychiatric hospitalization given absence of evidence of risk of harm to self.    PLAN:  -Continue to hold adderall/wellbutrin as these may lower seizure threshold  -Continue to hold trazodone 200mg qhs due to sedation  -Continued to hold fluvoxamine (end of taper)  -Continue Clozapine 150mg qhs, would increase gradually now that seizures resolved and on antiepileptic though holding until disposition question addressed  -Monitor ANC, orthostatics   -c/w Ativan 2mg HS-monitor for sedation, hold if RR<12  -c/w Lithium 300mg daily, 600mg HS-monitor BUN/CR - level on 5/31 0.89  -Ativan 2mg PO/IM/IV Q6H PRN -agitation, monitor for sedation, hold if RR<12  -Monitor EKG qtc interval  -Obtain B-HCG  -f/u discussion re disposition, possible voluntary psych hospitalization    Case discussed with medical team    Will follow 26 yo , single, female residing with roommate with PMHx urinary incontinence, PSHx cholecystectomy, as per chart, PPHx bipolar disorder with psychotic features, SIB BPD, MDD, dysthymic disorder, presents to [facility] for c/o forgetfulness, twitching/shaking, eye droop and abnormal gait for last month. [facility] psychiatry consulted for medication management.    Psych [facility] discontinued wellbutrin and adderall and decreased clozapine as they can lower seizure threshold, clozapine now appropriate for gradual titration with care to avoid seizure precipitation. Agree with neuro rec to discontinue lorazepam for possible seizure destabilization.     5/30 - Today's lithium level is therepeutic.  In the setting of concern for possible epileptiform activity would recommend the following: (will lower clozapine, will stop trazodone, will ask for another lithium level tomorrow, address medical/neurologic issues)    5/31 - pt lethargic, unable to engage in assessment, likely multifactorial (discontinuation of stimulating agents (adderall/wellbutrin), UTI/delirium). Lithium level within therapeutic window this AM. See recs below     6/1 - much more alert/awake since yesterday's lethargy, possibly related to adjustment s/p holding stimulant, bupropion, trazodone,  possibly related to improvement in sz activity if improved in response to med changes.     6/2 - more fatigued than yesterday's exam though easily aroused and cooperative, still forgetting information communicated. Received last night's clozapine since BP was wnl, lorazepam held due to lethargy. EEG suggestive of seizure activity. Pending discussion with outpatient psych NP (left VM for her).    6/3; EEG results reviewed, pt. did not get any PRNs, standing ATivan held due to sedation. Patient seen and evaluated. seems tired, able to engage well in interview, AAOX3, she denies significant anxiety. She remains future-oriented. Denies acute psychotic sxs, adamantly denies SI and HI.     6/04; Today's exam appears more alert though newly sad/depressed since yesterday, though she was easily aroused and cooperative with exam, improved recall of recent communication. More accepting of lorazepam being held though expressing distress at having it and the trazodone held not because she can't sleep rather because she prefers to feel somewhat numb. Discussed possibility of voluntary psychiatric hospitalization, pt expressing ambivalence, per pt's request discussed with mother who is adamantly opposed, working with both to plan disposition.     -Recommend voluntary psychiatric hospitalization at [facility] for med optimization and acute mood changes in setting of significant med changes this hospitalization which may continue to worsen though pt deferring to mother who is adamantly opposed and she does not presently meet criteria for involuntary psychiatric hospitalization given absence of evidence of risk of harm to self.    PLAN:  -Continue to hold adderall/wellbutrin as these may lower seizure threshold  -Continue to hold trazodone 200mg qhs due to sedation  -Continued to hold fluvoxamine (end of taper)  -Continue Clozapine 150mg qhs, would increase gradually now that seizures resolved and on antiepileptic though holding until disposition question addressed  -Monitor ANC, orthostatics   -Ativan 2mg HS- has been discontinued as pt. has been sedated for the past few day. Agree to HOLD   -c/w Lithium 300mg daily, 600mg HS-monitor BUN/CR - level on 5/31 0.89  -Ativan 2mg PO/IM/IV Q6H PRN -agitation, monitor for sedation, hold if RR<12  -Monitor EKG qtc interval  -Obtain B-HCG  -f/u discussion re disposition, possible voluntary psych hospitalization    Case discussed with medical team    Will follow 26 yo , single, female residing with roommate with PMHx urinary incontinence, PSHx cholecystectomy, as per chart, PPHx bipolar disorder with psychotic features, SIB BPD, MDD, dysthymic disorder, presents to [facility] for c/o forgetfulness, twitching/shaking, eye droop and abnormal gait for last month. [facility] psychiatry consulted for medication management.    Psych [facility] discontinued wellbutrin and adderall and decreased clozapine as they can lower seizure threshold, clozapine now appropriate for gradual titration with care to avoid seizure precipitation. Agree with neuro rec to discontinue lorazepam for possible seizure destabilization.     5/30 - Today's lithium level is therepeutic.  In the setting of concern for possible epileptiform activity would recommend the following: (will lower clozapine, will stop trazodone, will ask for another lithium level tomorrow, address medical/neurologic issues)    5/31 - pt lethargic, unable to engage in assessment, likely multifactorial (discontinuation of stimulating agents (adderall/wellbutrin), UTI/delirium). Lithium level within therapeutic window this AM. See recs below     6/1 - much more alert/awake since yesterday's lethargy, possibly related to adjustment s/p holding stimulant, bupropion, trazodone,  possibly related to improvement in sz activity if improved in response to med changes.     6/2 - more fatigued than yesterday's exam though easily aroused and cooperative, still forgetting information communicated. Received last night's clozapine since BP was wnl, lorazepam held due to lethargy. EEG suggestive of seizure activity. Pending discussion with outpatient psych NP (left VM for her).    6/3; EEG results reviewed, pt. did not get any PRNs, standing ATivan held due to sedation. Patient seen and evaluated. seems tired, able to engage well in interview, AAOX3, she denies significant anxiety. She remains future-oriented. Denies acute psychotic sxs, adamantly denies SI and HI.     6/04; Today's exam appears more alert though newly sad/depressed since yesterday, though she was easily aroused and cooperative with exam, improved recall of recent communication. More accepting of lorazepam being held though expressing distress at having it and the trazodone held not because she can't sleep rather because she prefers to feel somewhat numb. Discussed possibility of voluntary psychiatric hospitalization, pt expressing ambivalence, per pt's request discussed with mother who is adamantly opposed, working with both to plan disposition.     -Recommend voluntary psychiatric hospitalization at [facility] for med optimization and acute mood changes in setting of significant med changes this hospitalization which may continue to worsen though pt deferring to mother who is adamantly opposed and she does not presently meet criteria for involuntary psychiatric hospitalization given absence of evidence of risk of harm to self.    PLAN:  -Continue to hold adderall/wellbutrin as these may lower seizure threshold  -Continue to hold trazodone 200mg qhs due to sedation  -Continued to hold fluvoxamine (end of taper)  -Continue Clozapine 150mg qhs, would increase gradually now that seizures resolved and on antiepileptic though holding until disposition question addressed  -Monitor ANC, orthostatics   -Ativan 2mg HS- has been discontinued as pt. has been sedated for the past few day. Agree to HOLD   -c/w Lithium 300mg daily, 600mg HS-monitor BUN/CR - Monitor Lithium level, level on 5/31 0.89  -Ativan 2mg PO/IM/IV Q6H PRN -agitation, monitor for sedation, hold if RR<12  -Monitor EKG qtc interval  -Obtain B-HCG  -f/u discussion re disposition, possible voluntary psych hospitalization    Case discussed with medical team    Will follow

## 2020-06-04 NOTE — PROGRESS NOTE ADULT - SUBJECTIVE AND OBJECTIVE BOX
Author:   Pernell Amaya MD  Internal Medicine, PGY1  [phone]    Patient:  SUSI COOPER  [id number]    Progress Note    Interval events: No acute events.  Pertinent ROS (if any):      Administered:  amoxicillin  875 milliGRAM(s)/clavulanate: 1 Tablet(s) Oral (06-04 @ 05:48)  cloZAPine: 150 milliGRAM(s) Oral (06-03 @ 21:58)  bisacodyl: 5 milliGRAM(s) Oral (06-03 @ 21:58)  zonisamide: 300 milliGRAM(s) Oral (06-03 @ 21:57)  senna: 2 Tablet(s) Oral (06-03 @ 21:56)  lithium: 600 milliGRAM(s) Oral (06-03 @ 21:56)        OBJECTIVE:    06-03 @ 07:01  -  06-04 @ 07:00  --------------------------------------------------------  IN: 0 mL / OUT: 1450 mL / NET: -1450 mL      CAPILLARY BLOOD GLUCOSE            VITALS:  T(F): 97.7 (06-04-20 @ 05:45), Max: 98.1 (06-03-20 @ 20:10)  HR: 80 (06-04-20 @ 05:45) (79 - 89)  BP: 91/62 (06-04-20 @ 05:45) (88/55 - 97/65)  RR: 17 (06-04-20 @ 05:45) (16 - 17)  SpO2: 98% (06-04-20 @ 05:45) (98% - 100%)    PHYSICAL EXAM:  GENERAL: NAD, lying in bed comfortably  HEAD:  Atraumatic, Normocephalic  EYES: EOMI, PERRLA, conjunctiva and sclera clear  ENT: Moist mucous membranes  NECK: Supple, No JVD  CHEST/LUNG: Clear to auscultation bilaterally; No rales, rhonchi, wheezing, or rubs. Unlabored respirations  HEART: Regular rate and rhythm; No murmurs, rubs, or gallops  ABDOMEN: Bowel sounds present; Soft, Nontender, Nondistended. No hepatomegaly  EXTREMITIES:  2+ Peripheral Pulses, brisk capillary refill. No clubbing, cyanosis, or edema  NERVOUS SYSTEM:  Alert & Oriented X3, speech clear. No deficits   MSK: FROM all 4 extremities, full and equal strength  SKIN: No rashes or lesions    HOSPITAL MEDICATIONS:  Standing Meds:  amoxicillin  875 milliGRAM(s)/clavulanate 1 Tablet(s) Oral two times a day  bisacodyl 5 milliGRAM(s) Oral at bedtime  cholecalciferol 2000 Unit(s) Oral daily  cloZAPine 150 milliGRAM(s) Oral at bedtime  lithium 300 milliGRAM(s) Oral daily  lithium 600 milliGRAM(s) Oral at bedtime  polyethylene glycol 3350 17 Gram(s) Oral daily  senna 2 Tablet(s) Oral at bedtime  zonisamide 300 milliGRAM(s) Oral at bedtime      PRN Meds:  LORazepam     Tablet 2 milliGRAM(s) Oral every 6 hours PRN  ondansetron Injectable 4 milliGRAM(s) IV Push three times a day PRN      LABS:  CBC 06-04-20 @ 05:49                        11.9   7.79  )-----------( 363                   34.1       Hgb trend: 11.9 <-- , 12.5 <-- , 11.8 <--   WBC trend: 7.79 <-- , 7.21 <-- , 8.52 <--       CMP 06-04-20 @ 05:49    139  |  107  |  16  ----------------------------<  94  3.7   |  19<L>  |  0.69    Ca    9.3      06-04-20 @ 05:49  Phos  3.2     06-04  Mg     2.2     06-04        Serum Cr trend: 0.69 <-- , 0.61 <-- , 0.70 <--         ABG Trend:         MICROBIOLOGY:       RADIOLOGY:  [ ] Reviewed and interpreted by me    EKG: Author:   Pernell Amaya MD  Internal Medicine, PGY1  [phone]    Patient:  SUSI COOPER  [id number]    Progress Note    Interval events: No acute events. Today pt noted to be more awake and interactive. Discussed her recent med changes.   Pertinent ROS (if any):      Administered:  amoxicillin  875 milliGRAM(s)/clavulanate: 1 Tablet(s) Oral (06-04 @ 05:48)  cloZAPine: 150 milliGRAM(s) Oral (06-03 @ 21:58)  bisacodyl: 5 milliGRAM(s) Oral (06-03 @ 21:58)  zonisamide: 300 milliGRAM(s) Oral (06-03 @ 21:57)  senna: 2 Tablet(s) Oral (06-03 @ 21:56)  lithium: 600 milliGRAM(s) Oral (06-03 @ 21:56)        OBJECTIVE:    06-03 @ 07:01  -  06-04 @ 07:00  --------------------------------------------------------  IN: 0 mL / OUT: 1450 mL / NET: -1450 mL      CAPILLARY BLOOD GLUCOSE            VITALS:  T(F): 97.7 (06-04-20 @ 05:45), Max: 98.1 (06-03-20 @ 20:10)  HR: 80 (06-04-20 @ 05:45) (79 - 89)  BP: 91/62 (06-04-20 @ 05:45) (88/55 - 97/65)  RR: 17 (06-04-20 @ 05:45) (16 - 17)  SpO2: 98% (06-04-20 @ 05:45) (98% - 100%)    PHYSICAL EXAM:  GENERAL: NAD, lying in bed comfortably  HEAD:  Atraumatic, Normocephalic  EYES: EOMI, PERRLA, conjunctiva and sclera clear  ENT: Moist mucous membranes  NECK: Supple, No JVD  CHEST/LUNG: Clear to auscultation bilaterally; No rales, rhonchi, wheezing, or rubs. Unlabored respirations  HEART: Regular rate and rhythm; No murmurs, rubs, or gallops  ABDOMEN: Bowel sounds present; Soft, Nontender, Nondistended. No hepatomegaly  EXTREMITIES:  2+ Peripheral Pulses, brisk capillary refill. No clubbing, cyanosis, or edema  NERVOUS SYSTEM:  Alert & Oriented X3, speech clear. No deficits   MSK: FROM all 4 extremities, full and equal strength  SKIN: No rashes or lesions    HOSPITAL MEDICATIONS:  Standing Meds:  amoxicillin  875 milliGRAM(s)/clavulanate 1 Tablet(s) Oral two times a day  bisacodyl 5 milliGRAM(s) Oral at bedtime  cholecalciferol 2000 Unit(s) Oral daily  cloZAPine 150 milliGRAM(s) Oral at bedtime  lithium 300 milliGRAM(s) Oral daily  lithium 600 milliGRAM(s) Oral at bedtime  polyethylene glycol 3350 17 Gram(s) Oral daily  senna 2 Tablet(s) Oral at bedtime  zonisamide 300 milliGRAM(s) Oral at bedtime      PRN Meds:  LORazepam     Tablet 2 milliGRAM(s) Oral every 6 hours PRN  ondansetron Injectable 4 milliGRAM(s) IV Push three times a day PRN      LABS:  CBC 06-04-20 @ 05:49                        11.9   7.79  )-----------( 363                   34.1       Hgb trend: 11.9 <-- , 12.5 <-- , 11.8 <--   WBC trend: 7.79 <-- , 7.21 <-- , 8.52 <--       CMP 06-04-20 @ 05:49    139  |  107  |  16  ----------------------------<  94  3.7   |  19<L>  |  0.69    Ca    9.3      06-04-20 @ 05:49  Phos  3.2     06-04  Mg     2.2     06-04        Serum Cr trend: 0.69 <-- , 0.61 <-- , 0.70 <--         ABG Trend:         MICROBIOLOGY:       RADIOLOGY:  [ ] Reviewed and interpreted by me    EKG:

## 2020-06-04 NOTE — PROGRESS NOTE BEHAVIORAL HEALTH - CASE SUMMARY
Patient seen and evaluated, I agree with above assessment and plan, pt. AAOX3, engaging well, pt. reporting feeling depressed/sad in the context of current ongoing acute medical issues, also reported passive suicidal thoughts today, she adamantly denies SI/I/P, denies HI. Denies acute psychotic or manic sxs. Remains future oriented. Discussed about possible voluntary admission to [facility] for medication management/stabalization, pt. ambivalent about it. Patient wanted the team to speak with pt.'s mother, mother not agreeing for [facility] admission and she thinks that daughter's condition would worsen if she were transferred to [facility]. Recommend to continue meds. as written above,  ongoing discussion regarding disposition, will follow up, case discussed in detail with Dr. Avila. Patient seen and evaluated, I agree with above assessment and plan, pt. AAOX3, engaging well, pt. reporting feeling depressed/sad in the context of current ongoing acute medical issues, also reported passive suicidal thoughts today, she adamantly denies active SI/I/P, denies HI. Denies acute psychotic or manic sxs. Remains future oriented. Discussed about possible voluntary admission to [facility] for medication management/stabalization, pt. ambivalent about it. Patient wanted the team to speak with pt.'s mother, mother not agreeing for [facility] admission and she thinks that daughter's condition would worsen if she were transferred to [facility]. Recommend to continue meds. as written above,  ongoing discussion regarding disposition, will follow up, case discussed in detail with Dr. Avila.

## 2020-06-04 NOTE — PROGRESS NOTE BEHAVIORAL HEALTH - NSBHFUPINTERVALHXFT_PSY_A_CORE
EEG results reviewed and cleared by neurology, pt did not get any PRNs, standing lorazepam held in setting of no seizure activity without and continued somnolence.     Patient seen and evaluated, still tired, able to engage well in interview, AAOX3, observed tearful and expressed feeling depressed (new since yesterday), admits to intermittent passive though not active SI. Still hopeful though distressed and overwhelmed by present stressors. Remains future-oriented. Denies acute psychotic sxs, adamantly denies active SI or HI.     Discussed possibility of voluntary psychiatric hospitalization to optimize medications, explaining that depressive state may be caused by sudden discontinuation/reduction of multiple medications, easier to optimize safely and more quickly inpatient. Patient expressed ambivalence, agreed to think about it and discuss with her mother and provide answer in the afternoon. Per pt's request, spoke with pt's mother who expressed frustration with medical clearance, questioning medical and specialist evaluations and treatment, upset and rejecting the possibility of [facility] admission asserting bad previous experience and adamant her daughter's condition would worsen if she were transferred there despite discussion of acute psych exacerbation/destabilization from abrupt medication changes and benefit from admission which would be difficult to achieve in a timely way outpatient. EEG results reviewed and cleared by neurology, pt did not get any PRNs, standing lorazepam held in setting of no seizure activity without and continued somnolence.     Patient seen and evaluated, still tired, able to engage well in interview, AAOX3, observed tearful and expressed feeling depressed (new since yesterday), admits to intermittent passive SI though not active SI. Denies any intentions or plans.  Still hopeful though distressed and overwhelmed by present stressors. Remains future-oriented. Denies acute psychotic sxs, adamantly denies active SI/I/P or HI.     Discussed possibility of voluntary psychiatric hospitalization to optimize medications, explaining that depressive state may be caused by sudden discontinuation/reduction of multiple medications, easier to optimize safely and more quickly inpatient. Patient expressed ambivalence, agreed to think about it and discuss with her mother and provide answer in the afternoon. Per pt's request, spoke with pt's mother who expressed frustration with medical clearance, questioning medical and specialist evaluations and treatment, upset and rejecting the possibility of [facility] admission asserting bad previous experience and adamant her daughter's condition would worsen if she were transferred there despite extensive discussion of acute psych exacerbation/destabilization from abrupt medication changes and benefit from admission which would be difficult to achieve in a timely way outpatient.

## 2020-06-04 NOTE — EEG REPORT - NS EEG TEXT BOX
[facility]   COMPREHENSIVE EPILEPSY CENTER     [facility]: [address], [place] [address], Ph#: [phone]  [facility]: [address] [place] [address], Ph#: [phone]  [facility]: [address] [place] [address], Ph#: [phone]    Patient Name: SUSI COOPER  Age and : 25y ([birth year])  MRN #: [id number]  Location: [place] [address] A  Referring Physician: Helen Chapa    Start Date: 20 08:00 to 6/3/20 17:50  Duration: 9hr 49min    _____________________________________________________________  TECHNICAL INFORMATION    Placement and Labeling of Electrodes:  The EEG was performed utilizing 20 channels referential EEG connections (coronal over temporal over parasagittal montage) using all standard 10-20 electrode placements with EKG.  Recording was at a sampling rate of 256 samples per second per channel.  Time synchronized digital video recording was done simultaneously with EEG recording.  A low light infrared camera was used for low light recording.  Hussain and seizure detection algorithms were utilized.    _____________________________________________________________  HISTORY    Patient is a 25y old  Female who presents with a chief complaint of CC: Stuttering (29 May 2020 07:22)      MEDICATIONS  (STANDING):  amoxicillin  875 milliGRAM(s)/clavulanate 1 Tablet(s) Oral two times a day  cholecalciferol 2000 Unit(s) Oral daily  cloZAPine 150 milliGRAM(s) Oral at bedtime  lithium 300 milliGRAM(s) Oral daily  lithium 600 milliGRAM(s) Oral at bedtime  LORazepam     Tablet 2 milliGRAM(s) Oral at bedtime  polyethylene glycol 3350 17 Gram(s) Oral daily  senna 2 Tablet(s) Oral at bedtime  zonisamide 200 milliGRAM(s) Oral at bedtime      _____________________________________________________________  STUDY INTERPRETATION    Findings: The background was continuous, spontaneously variable and reactive. During wakefulness, the posterior dominant rhythm consisted of symmetric, well-modulated 7-8 Hz activity, with amplitude to 40 uV, that attenuated to eye opening.     Background Slowing:  Diffuse theta and polymorphic delta slowing.      Focal Slowing:   None were present.    Sleep Background:  There were symmetric vertex waves, sleep spindles and k-complexes.    Other Non-Epileptiform Findings:  None were present.    Interictal Epileptiform Activity:   - Occasional spike-wave discharges broad left hemisphere max left frontocentral (max F3/C3)   - Rare diffuse more generalized appearing spike wave discharges  - Occasional spike/polyspike-wave discharges in right frontotemporal (F8/T8 >>Fp2/F4)    Events:  Clinical events: 1 push button event for unknown reason which did not have an ictal correlate on EEG.  Seizures: None recorded.    Activation Procedures:   Hyperventilation was performed and did not elicit any abnormality.  Photic stimulation was performed and did not elicit any abnormality.    Artifacts:  Intermittent myogenic and movement artifacts were noted.    ECG:  The heart rate on single channel ECG was predominantly between 50-60 BPM.    _____________________________________________________________  EEG SUMMARY/CLASSIFICATION    Unchanged abnormal EEG in the awake state.  - Occasional spike-wave discharges broad left hemisphere max left frontocentral (max F3/C3)   - Rare diffuse more generalized appearing spike wave discharges  - Occasional spike/polyspike-wave discharges in right frontotemporal (F8/T8 >>Fp2/F4)  - Mild to moderate generalized slowing.    _____________________________________________________________  EEG IMPRESSION/CLINICAL CORRELATE    Abnormal EEG study, similar to prior, consistent with:  Both features of multifocal epilepsy potentially with rapid bilateral synchrony, and generalized epilepsy are present. Clinical correlation is recommended.   No seizure seen.    Mild to moderate nonspecific diffuse or multifocal cerebral dysfunction.     Preliminary Fellow Report  _____________________________________________________________    Radha Poon MD   Epilepsy Fellow    Petar Eason MD PhD  Attending Physician, [facility]   COMPREHENSIVE EPILEPSY CENTER     [facility]: [address], [place] [address], Ph#: [phone]  [facility]: [address] [address], Ph#: [phone]  [facility]: [address] [place] [address], Ph#: [phone]    Patient Name: SUSI COOPER  Age and : 25y ([birth year])  MRN #: [id number]  Location: [place] [address] A  Referring Physician: Helen Chapa    Start Date: 20 08:00 to 6/3/20 17:50  Duration: 9hr 49min    _____________________________________________________________  TECHNICAL INFORMATION    Placement and Labeling of Electrodes:  The EEG was performed utilizing 20 channels referential EEG connections (coronal over temporal over parasagittal montage) using all standard 10-20 electrode placements with EKG.  Recording was at a sampling rate of 256 samples per second per channel.  Time synchronized digital video recording was done simultaneously with EEG recording.  A low light infrared camera was used for low light recording.  Hussain and seizure detection algorithms were utilized.    _____________________________________________________________  HISTORY    Patient is a 25y old  Female who presents with a chief complaint of CC: Stuttering (29 May 2020 07:22)      MEDICATIONS  (STANDING):  amoxicillin  875 milliGRAM(s)/clavulanate 1 Tablet(s) Oral two times a day  cholecalciferol 2000 Unit(s) Oral daily  cloZAPine 150 milliGRAM(s) Oral at bedtime  lithium 300 milliGRAM(s) Oral daily  lithium 600 milliGRAM(s) Oral at bedtime  LORazepam     Tablet 2 milliGRAM(s) Oral at bedtime  polyethylene glycol 3350 17 Gram(s) Oral daily  senna 2 Tablet(s) Oral at bedtime  zonisamide 200 milliGRAM(s) Oral at bedtime      _____________________________________________________________  STUDY INTERPRETATION    Findings: The background was continuous, spontaneously variable and reactive. During wakefulness, the posterior dominant rhythm consisted of symmetric, well-modulated 7-8 Hz activity, with amplitude to 40 uV, that attenuated to eye opening.     Background Slowing:  Diffuse theta and polymorphic delta slowing.      Focal Slowing:   None were present.    Sleep Background:  There were symmetric vertex waves, sleep spindles and k-complexes.    Other Non-Epileptiform Findings:  None were present.    Interictal Epileptiform Activity:   - Occasional spike-wave discharges broad left hemisphere max left frontocentral (max F3/C3)   - Rare diffuse more generalized appearing spike wave discharges  - Occasional spike/polyspike-wave discharges in right frontotemporal (F8/T8 >>Fp2/F4)    Events:  Clinical events: 1 push button event for unknown reason which did not have an ictal correlate on EEG.  Seizures: None recorded.    Activation Procedures:   Hyperventilation was performed and did not elicit any abnormality.  Photic stimulation was performed and did not elicit any abnormality.    Artifacts:  Intermittent myogenic and movement artifacts were noted.    ECG:  The heart rate on single channel ECG was predominantly between 50-60 BPM.    _____________________________________________________________  EEG SUMMARY/CLASSIFICATION    Unchanged abnormal EEG in the awake state.  - Occasional spike-wave discharges broad left hemisphere max left frontocentral (max F3/C3)   - Rare diffuse more generalized appearing spike wave discharges  - Occasional spike/polyspike-wave discharges in right frontotemporal (F8/T8 >>Fp2/F4)  - Mild to moderate generalized slowing.    _____________________________________________________________  EEG IMPRESSION/CLINICAL CORRELATE    Abnormal EEG study, similar to prior, consistent with:  Both features of multifocal epilepsy potentially with rapid bilateral synchrony, and generalized epilepsy are present. Clinical correlation is recommended.   No seizure seen.    Mild to moderate nonspecific diffuse or multifocal cerebral dysfunction.       _____________________________________________________________    Radha Poon MD   Epilepsy Fellow    Petar Eason MD PhD  Attending Physician, [facility] [place]

## 2020-06-04 NOTE — PROGRESS NOTE BEHAVIORAL HEALTH - NSBHCONSULTFOLLOWDETAILS_PSY_A_CORE FT
Medication changes, seizure activity on EEG  Needs resolution of dispo question, particularly f/u re possible psych hospitalization after medical discharge

## 2020-06-04 NOTE — DIETITIAN INITIAL EVALUATION ADULT. - ENERGY NEEDS
Height (cm): 162.6 (29 May 2020 03:32) Weight (kg): 78 (29 May 2020 03:32)  BMI (kg/m2): 29.5 (29 May 2020 03:32)  IBW: 120lbs (+/-10%)

## 2020-06-04 NOTE — DIETITIAN INITIAL EVALUATION ADULT. - OTHER INFO
26 y/o female with medical history of bipolar disorder, borderline personality disorder, MDD, recent cholecystectomy for acute cholecystitis who presents for worsening abdominal pain, sturring, forgetfulness likely related to post-op pain and profound polypharmacy per chart review. RD unable to have face to face encounter with patient to perform nutrition interview and/or nutrition-focused physical exam due to visitation limitations in efforts to mitigate the spread of COVID-19. Information obtained from chart review. Attempts to reach patient via cell phone ([phone] incorrect number in EMR) also tried room phone ([phone]) unable to reach patient. Currently on regular diet, tolerating well per chart review. No nausea/vomiting or difficulty chewing and swallowing reported. +Constipation, last bowel movement recorded on 5/28. NKFA. Weight trend via HIE, 77.1kg (8/9/17), 69.9kg (8/14/18), 70.8kg (7/17/19), 77.1kg (5/22/20), and now 78kg (5/28/20). No significant weight changes noted.

## 2020-06-04 NOTE — DIETITIAN INITIAL EVALUATION ADULT. - ADD RECOMMEND
1. Continue Bowel Regimen as needed 2. Consider Multivitamin with minerals for micronutrient coverage.

## 2020-06-05 ENCOUNTER — TRANSCRIPTION ENCOUNTER (OUTPATIENT)
Age: 25
End: 2020-06-05

## 2020-06-05 VITALS
OXYGEN SATURATION: 100 % | SYSTOLIC BLOOD PRESSURE: 90 MMHG | HEART RATE: 89 BPM | RESPIRATION RATE: 19 BRPM | DIASTOLIC BLOOD PRESSURE: 62 MMHG

## 2020-06-05 DIAGNOSIS — R32 UNSPECIFIED URINARY INCONTINENCE: ICD-10-CM

## 2020-06-05 DIAGNOSIS — I95.1 ORTHOSTATIC HYPOTENSION: ICD-10-CM

## 2020-06-05 LAB
ANION GAP SERPL CALC-SCNC: 14 MMO/L — SIGNIFICANT CHANGE UP (ref 7–14)
BUN SERPL-MCNC: 12 MG/DL — SIGNIFICANT CHANGE UP (ref 7–23)
CALCIUM SERPL-MCNC: 9.2 MG/DL — SIGNIFICANT CHANGE UP (ref 8.4–10.5)
CHLORIDE SERPL-SCNC: 109 MMOL/L — HIGH (ref 98–107)
CO2 SERPL-SCNC: 18 MMOL/L — LOW (ref 22–31)
CREAT SERPL-MCNC: 0.55 MG/DL — SIGNIFICANT CHANGE UP (ref 0.5–1.3)
GLUCOSE SERPL-MCNC: 91 MG/DL — SIGNIFICANT CHANGE UP (ref 70–99)
HCT VFR BLD CALC: 34.9 % — SIGNIFICANT CHANGE UP (ref 34.5–45)
HGB BLD-MCNC: 12.1 G/DL — SIGNIFICANT CHANGE UP (ref 11.5–15.5)
LITHIUM SERPL-MCNC: 0.6 MMOL/L — SIGNIFICANT CHANGE UP (ref 0.6–1.2)
MAGNESIUM SERPL-MCNC: 2.3 MG/DL — SIGNIFICANT CHANGE UP (ref 1.6–2.6)
MCHC RBC-ENTMCNC: 31.3 PG — SIGNIFICANT CHANGE UP (ref 27–34)
MCHC RBC-ENTMCNC: 34.7 % — SIGNIFICANT CHANGE UP (ref 32–36)
MCV RBC AUTO: 90.2 FL — SIGNIFICANT CHANGE UP (ref 80–100)
NRBC # FLD: 0 K/UL — SIGNIFICANT CHANGE UP (ref 0–0)
PHOSPHATE SERPL-MCNC: 2.3 MG/DL — LOW (ref 2.5–4.5)
PLATELET # BLD AUTO: 349 K/UL — SIGNIFICANT CHANGE UP (ref 150–400)
PMV BLD: 10.1 FL — SIGNIFICANT CHANGE UP (ref 7–13)
POTASSIUM SERPL-MCNC: 4 MMOL/L — SIGNIFICANT CHANGE UP (ref 3.5–5.3)
POTASSIUM SERPL-SCNC: 4 MMOL/L — SIGNIFICANT CHANGE UP (ref 3.5–5.3)
RBC # BLD: 3.87 M/UL — SIGNIFICANT CHANGE UP (ref 3.8–5.2)
RBC # FLD: 13 % — SIGNIFICANT CHANGE UP (ref 10.3–14.5)
SODIUM SERPL-SCNC: 141 MMOL/L — SIGNIFICANT CHANGE UP (ref 135–145)
WBC # BLD: 8.58 K/UL — SIGNIFICANT CHANGE UP (ref 3.8–10.5)
WBC # FLD AUTO: 8.58 K/UL — SIGNIFICANT CHANGE UP (ref 3.8–10.5)

## 2020-06-05 PROCEDURE — 99233 SBSQ HOSP IP/OBS HIGH 50: CPT

## 2020-06-05 PROCEDURE — 99239 HOSP IP/OBS DSCHRG MGMT >30: CPT | Mod: GC

## 2020-06-05 RX ORDER — FLUVOXAMINE MALEATE 25 MG/1
100 TABLET ORAL
Qty: 0 | Refills: 0 | DISCHARGE

## 2020-06-05 RX ORDER — BUPROPION HYDROCHLORIDE 150 MG/1
1 TABLET, EXTENDED RELEASE ORAL
Qty: 0 | Refills: 0 | DISCHARGE

## 2020-06-05 RX ORDER — DEXTROAMPHETAMINE SACCHARATE, AMPHETAMINE ASPARTATE, DEXTROAMPHETAMINE SULFATE AND AMPHETAMINE SULFATE 1.875; 1.875; 1.875; 1.875 MG/1; MG/1; MG/1; MG/1
1 TABLET ORAL
Qty: 0 | Refills: 0 | DISCHARGE

## 2020-06-05 RX ORDER — TRAZODONE HCL 50 MG
2 TABLET ORAL
Qty: 0 | Refills: 0 | DISCHARGE

## 2020-06-05 RX ORDER — CLOZAPINE 150 MG/1
3 TABLET, ORALLY DISINTEGRATING ORAL
Qty: 0 | Refills: 0 | DISCHARGE

## 2020-06-05 RX ORDER — LITHIUM CARBONATE 300 MG/1
1 TABLET, EXTENDED RELEASE ORAL
Qty: 0 | Refills: 0 | DISCHARGE
Start: 2020-06-05

## 2020-06-05 RX ORDER — ZONISAMIDE 100 MG
3 CAPSULE ORAL
Qty: 90 | Refills: 0
Start: 2020-06-05 | End: 2020-07-04

## 2020-06-05 RX ORDER — CLOZAPINE 150 MG/1
1 TABLET, ORALLY DISINTEGRATING ORAL
Qty: 0 | Refills: 0 | DISCHARGE

## 2020-06-05 RX ORDER — CLOZAPINE 150 MG/1
3 TABLET, ORALLY DISINTEGRATING ORAL
Qty: 90 | Refills: 0
Start: 2020-06-05 | End: 2020-07-04

## 2020-06-05 RX ORDER — SODIUM CHLORIDE 9 MG/ML
1000 INJECTION, SOLUTION INTRAVENOUS
Refills: 0 | Status: DISCONTINUED | OUTPATIENT
Start: 2020-06-05 | End: 2020-06-05

## 2020-06-05 RX ADMIN — LITHIUM CARBONATE 300 MILLIGRAM(S): 300 TABLET, EXTENDED RELEASE ORAL at 11:30

## 2020-06-05 RX ADMIN — SODIUM CHLORIDE 75 MILLILITER(S): 9 INJECTION, SOLUTION INTRAVENOUS at 10:16

## 2020-06-05 RX ADMIN — Medication 2000 UNIT(S): at 11:29

## 2020-06-05 RX ADMIN — Medication 1 TABLET(S): at 06:32

## 2020-06-05 RX ADMIN — POLYETHYLENE GLYCOL 3350 17 GRAM(S): 17 POWDER, FOR SOLUTION ORAL at 06:32

## 2020-06-05 NOTE — PROGRESS NOTE ADULT - ATTENDING COMMENTS
A 25 year old woman with a hx of bipolar disorder, borderline personality disorder, urinary incontinence, and recent cholecystectomy for acute cholecystitis who presents for stuttering, forgetfulness and abnormal gait and had abdominal pain in the ED with CT findings concerning for GB fossa fluid collection. Patient seen and evaluated at bedside. More awake today, communicative, still with occasional word finding difficulty. Low mood.     Abdominal Pain: Present on admission, now resolved, 2/2 UTI.   - Afebrile, hemodynamically stable, with resolving leukocytosis.   - CT: GB Fossa Fluid Collection in the setting of recent Cholecystectomy.   - Appreciate Sx- likely post-operative changes. No acute surgical intervention indicated.  - GB Folla fluid collection not likely infectious per Surg, hold of on AB for intraabd infection.  - UTI: UA+cultures Enterococcus  + >100,000, blood cultures negative.   - On  Augmentin 6/1-6/6.   - COVID negative.      Stuttering, Forgetfulness, Abnormal Gait: h/o of ?Absence Seizures. Likely Epilepsy.   - CT Angio Head/ Neck negative for acute pathology.  - Brain MRI w/ contrast, w/o contrast (6/1): negative for acute findings.    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  - C/w Zonisamide 200 mg qhs. Increase by 100mg every 7 days. Goal of 300mg qhs  - HELD Welbutrin and Adderall, additional med changes per psych.   - Seizure precautions, Aspiration precautions.   - Follow up Neurology as outpatient.     Bipolar Disease: History of w/ concern for Polypharmacy.   - Behavioral Health consulted, recommendations appreciated.   - D/c Wellbutrin and Adderall due to current concerns of seizures  - Decreased then discontinued Luvox on 6/1.   - c/w Clozapine 150 HS-monitor WBC's, ANCs, monitor orthostatics  - Hold Trazodone 200mg HS, hold if qtc >500. Consider d/c as decreases seizure threshold  - c/w Ativan 2mg HS-monitor for sedation, hold if RR<12  - c/w Lithium 300mg daily, 600mg HS-monitor BUN/CR. Lithium level wnl.   - Utox negative, Ethanol, Salicylate, and Tylenol level wnl.   - On 6/4, reports some passive suicide ideation-  recommend voluntary IP [facility].   - Patient and patients mother both declining IP [facility], requesting f/u with outpatient [facility].  - Patient is psychiatrically cleared for dc, to f/u with Psych NP on Monday.   - Provided numbers to Outpatient Behavioral Health Clinic, And  Walk in clinic.     Urinary Incontinence: previously on Oxybutynin, now on Myrbetriq, f/u private Urology as OP.  Activity as tolerated.   Diet: as tolerated.  Dispo: Home today 6/5 with visiting nurse services.   Discharge Planning time: 34 min incoordinating d/c plan with patient, team, mother Veronica.     Updated pts mother Veronica James on 6/5- with overall discharge plan. Provided her an entire update on entire hospitalization, diagnosis, and treatment plans. Mother now feels more reassured with discharge plan. Nutrition services evaluated patient prior to discharge. Medications discussed with patient in detail. To follow up with Neurology as outpatient (number provided), To follow up with Behavioral Health as outpatient (outpatient clinic, walk in clinic- numbers provided). CM involved to set up visiting nurse. Advised to not operate any vehicles, use of machinery given recent diagnosis of epilepsy until further clearance from Neurology as outpatient.

## 2020-06-05 NOTE — PROGRESS NOTE BEHAVIORAL HEALTH - NSBHFUPINTERVALCCFT_PSY_A_CORE
""
"I'm fine"
"I'm fine"
"I'm tired"
"why did they stop my medications"
Im tired
"when can my mom pick me up?"

## 2020-06-05 NOTE — PROGRESS NOTE BEHAVIORAL HEALTH - SECONDARY DX1
Bipolar affective disorder in remission

## 2020-06-05 NOTE — PROGRESS NOTE BEHAVIORAL HEALTH - NSBHLOC_PSY_A_CORE
Other
Alert
Lethargic, arousable to verbal stimulus
Lethargic, arousable to verbal stimulus
Other
Other
Lethargic, arousable to verbal stimulus

## 2020-06-05 NOTE — PROGRESS NOTE BEHAVIORAL HEALTH - OTHER
not assessed, pt lying in hospital bed passive SI, denies active SI/I/P. Remains future oriented self-soothing sucking thumb denies passive or active SI/I/P. Remains future oriented, asking for outpt. referrals, no paranoia or delusions elicited

## 2020-06-05 NOTE — CHART NOTE - NSCHARTNOTEFT_GEN_A_CORE
Source: Patient [X ] via phone   Family [ ]     other [X ] electronic chart, RN, Unable to conduct a face to face interview or nutrition-focused physical exam due to limited contact restrictions related to Pt's medical condition and isolation precautions. Information obtained from chart review and patient via phone.     Diet : Diet, Regular (05-29-20 @ 12:43)    Nutrition follow for LOS. Patient reports tolerating regular diet, endorsing good appetite and po intake at this time. Denies any nausea/vomiting/diarrhea/constipation or difficulty chewing and swallowing at this time. States difficulty consuming ice cold water which she always preferred, Encouraged consumption of room temperature water throughout the day as tolerated. Provided extensive verbal instructions on General Healthful Nutrition Therapy. Discussed avoidance of high intakes of fat and fatty/fried foods. Written materials will be sent via email address as provided by patient ([email or web address]).       Weight (kg): 78 (29 May 2020 03:32) 77.1kg     Pertinent Medications: cholecalciferol  cloZAPine  lithium  lithium  polyethylene glycol 3350  senna  zonisamide    Pertinent Labs:  06-05 Na141 mmol/L Glu 91 mg/dL K+ 4.0 mmol/L Cr  0.55 mg/dL BUN 12 mg/dL 06-05 Phos 2.3 mg/dL<L>      Skin: No pressure ulcers/DTI noted in flowsheets.   No edema noted.     Estimated Needs:   [X ] no change since previous assessment  [ ] recalculated:        Additional Recommendations:     1. Continue current diet order, which remains appropriate at this time.   2. Monitor weights, labs, BM's, skin integrity, p.o. intake.   3. Please Encourage po intake, assist with meals and menu selections, provide alternatives PRN.   4. Multivitamin with minerals for micronutrient coverage.

## 2020-06-05 NOTE — PROGRESS NOTE ADULT - SUBJECTIVE AND OBJECTIVE BOX
Author:   Pernell Amaya MD  Internal Medicine, PGY1  [phone]    Patient:  SUSI COOPER  [id number]    Progress Note    Interval events: No acute events. BP low, s/p 1L bolus, but remained low thus did not get clozapine.   Pertinent ROS (if any):      Administered:  polyethylene glycol 3350: 17 Gram(s) Oral (06-05 @ 06:32)  amoxicillin  875 milliGRAM(s)/clavulanate: 1 Tablet(s) Oral (06-05 @ 06:32)  zonisamide: 300 milliGRAM(s) Oral (06-04 @ 21:56)  senna: 2 Tablet(s) Oral (06-04 @ 21:56)  lithium: 600 milliGRAM(s) Oral (06-04 @ 21:56)  lactated ringers Bolus: 1000 mL/Hr IV Bolus (06-04 @ 21:56)        OBJECTIVE:    06-04 @ 07:01  -  06-05 @ 07:00  --------------------------------------------------------  IN: 1000 mL / OUT: 2350 mL / NET: -1350 mL      CAPILLARY BLOOD GLUCOSE            VITALS:  T(F): 97.6 (06-05-20 @ 06:18), Max: 98.6 (06-04-20 @ 15:11)  HR: 85 (06-05-20 @ 06:18) (81 - 88)  BP: 86/54 (06-05-20 @ 06:18) (80/50 - 113/58)  RR: 16 (06-05-20 @ 06:18) (16 - 16)  SpO2: 97% (06-05-20 @ 06:18) (97% - 100%)    PHYSICAL EXAM:  GENERAL: NAD, lying in bed comfortably  HEAD:  Atraumatic, Normocephalic  EYES: EOMI, PERRLA, conjunctiva and sclera clear  ENT: Moist mucous membranes  NECK: Supple, No JVD  CHEST/LUNG: Clear to auscultation bilaterally; No rales, rhonchi, wheezing, or rubs. Unlabored respirations  HEART: Regular rate and rhythm; No murmurs, rubs, or gallops  ABDOMEN: Bowel sounds present; Soft, Nontender, Nondistended. No hepatomegaly  EXTREMITIES:  2+ Peripheral Pulses, brisk capillary refill. No clubbing, cyanosis, or edema  NERVOUS SYSTEM:  Alert & Oriented X3, speech clear. No deficits   MSK: FROM all 4 extremities, full and equal strength  SKIN: No rashes or lesions    HOSPITAL MEDICATIONS:  Standing Meds:  amoxicillin  875 milliGRAM(s)/clavulanate 1 Tablet(s) Oral two times a day  cholecalciferol 2000 Unit(s) Oral daily  cloZAPine 150 milliGRAM(s) Oral at bedtime  lithium 300 milliGRAM(s) Oral daily  lithium 600 milliGRAM(s) Oral at bedtime  polyethylene glycol 3350 17 Gram(s) Oral two times a day  senna 2 Tablet(s) Oral at bedtime  zonisamide 300 milliGRAM(s) Oral at bedtime      PRN Meds:  bisacodyl 5 milliGRAM(s) Oral every 12 hours PRN  LORazepam     Tablet 2 milliGRAM(s) Oral every 6 hours PRN  ondansetron Injectable 4 milliGRAM(s) IV Push three times a day PRN      LABS:  CBC 06-05-20 @ 06:30                        12.1   8.58  )-----------( 349                   34.9       Hgb trend: 12.1 <-- , 11.9 <-- , 12.5 <--   WBC trend: 8.58 <-- , 7.79 <-- , 7.21 <--       CMP 06-04-20 @ 05:49    139  |  107  |  16  ----------------------------<  94  3.7   |  19<L>  |  0.69    Phos  3.2     06-04  Mg     2.2     06-04        Serum Cr trend: 0.69 <-- , 0.61 <--         ABG Trend:         MICROBIOLOGY:       RADIOLOGY:  [ ] Reviewed and interpreted by me    EKG: Author:   Pernell Amaya MD  Internal Medicine, PGY1  [phone]    Patient:  SUSI COOPER  [id number]    Progress Note    Interval events: No acute events. BP low, s/p 1L bolus, but remained low thus did not get clozapine. Also orthostatic by HR overnight.   Pertinent ROS (if any):      Administered:  polyethylene glycol 3350: 17 Gram(s) Oral (06-05 @ 06:32)  amoxicillin  875 milliGRAM(s)/clavulanate: 1 Tablet(s) Oral (06-05 @ 06:32)  zonisamide: 300 milliGRAM(s) Oral (06-04 @ 21:56)  senna: 2 Tablet(s) Oral (06-04 @ 21:56)  lithium: 600 milliGRAM(s) Oral (06-04 @ 21:56)  lactated ringers Bolus: 1000 mL/Hr IV Bolus (06-04 @ 21:56)        OBJECTIVE:    06-04 @ 07:01  -  06-05 @ 07:00  --------------------------------------------------------  IN: 1000 mL / OUT: 2350 mL / NET: -1350 mL      CAPILLARY BLOOD GLUCOSE            VITALS:  T(F): 97.6 (06-05-20 @ 06:18), Max: 98.6 (06-04-20 @ 15:11)  HR: 85 (06-05-20 @ 06:18) (81 - 88)  BP: 86/54 (06-05-20 @ 06:18) (80/50 - 113/58)  RR: 16 (06-05-20 @ 06:18) (16 - 16)  SpO2: 97% (06-05-20 @ 06:18) (97% - 100%)    PHYSICAL EXAM:  GENERAL: NAD, lying in bed comfortably  HEAD:  Atraumatic, Normocephalic  EYES: EOMI, PERRLA, conjunctiva and sclera clear  ENT: Moist mucous membranes  NECK: Supple, No JVD  CHEST/LUNG: Clear to auscultation bilaterally; No rales, rhonchi, wheezing, or rubs. Unlabored respirations  HEART: Regular rate and rhythm; No murmurs, rubs, or gallops  ABDOMEN: Bowel sounds present; Soft, Nontender, Nondistended. No hepatomegaly  EXTREMITIES:  2+ Peripheral Pulses, brisk capillary refill. No clubbing, cyanosis, or edema  NERVOUS SYSTEM:  Alert & Oriented X3, speech clear. No deficits   MSK: FROM all 4 extremities, full and equal strength  SKIN: No rashes or lesions    HOSPITAL MEDICATIONS:  Standing Meds:  amoxicillin  875 milliGRAM(s)/clavulanate 1 Tablet(s) Oral two times a day  cholecalciferol 2000 Unit(s) Oral daily  cloZAPine 150 milliGRAM(s) Oral at bedtime  lithium 300 milliGRAM(s) Oral daily  lithium 600 milliGRAM(s) Oral at bedtime  polyethylene glycol 3350 17 Gram(s) Oral two times a day  senna 2 Tablet(s) Oral at bedtime  zonisamide 300 milliGRAM(s) Oral at bedtime      PRN Meds:  bisacodyl 5 milliGRAM(s) Oral every 12 hours PRN  LORazepam     Tablet 2 milliGRAM(s) Oral every 6 hours PRN  ondansetron Injectable 4 milliGRAM(s) IV Push three times a day PRN      LABS:  CBC 06-05-20 @ 06:30                        12.1   8.58  )-----------( 349                   34.9       Hgb trend: 12.1 <-- , 11.9 <-- , 12.5 <--   WBC trend: 8.58 <-- , 7.79 <-- , 7.21 <--     CMP 06-05-20 @ 06:30    141  |  109<H>  |  12  ----------------------------<  91  4.0   |  18<L>  |  0.55    Ca    9.2      06-05-20 @ 06:30  Phos  2.3     06-05  Mg     2.3     06-05        Serum Cr trend: 0.55 <-- , 0.69 <-- , 0.61 <--     ABG Trend:         MICROBIOLOGY:       RADIOLOGY:  [ ] Reviewed and interpreted by me    EKG:

## 2020-06-05 NOTE — PROVIDER CONTACT NOTE (OTHER) - ACTION/TREATMENT ORDERED:
1L bolus LR order and given
M.D. made aware awaiting order.
Alfonso Lazo notified. Awaiting new orders. Will continue to monitor.
Covering MD made aware. Coming to bedside.
Covering MD made aware. Recommended to hold ativan at this time.
Order to Bolus pt. Awaiting further instructions.

## 2020-06-05 NOTE — PROGRESS NOTE ADULT - PROBLEM SELECTOR PLAN 5
- Bowel regimen  - Pt requesting oral dulcolax -Large UOP over last 24H, normal per patient  -F/u outpatient urologist

## 2020-06-05 NOTE — PROGRESS NOTE BEHAVIORAL HEALTH - NSBHCHARTREVIEWVS_PSY_A_CORE FT
ICU Vital Signs Last 24 Hrs  T(C): 36.4 (05 Jun 2020 06:18), Max: 37 (04 Jun 2020 15:11)  T(F): 97.6 (05 Jun 2020 06:18), Max: 98.6 (04 Jun 2020 15:11)  HR: 85 (05 Jun 2020 06:18) (81 - 88)  BP: 86/54 (05 Jun 2020 06:18) (80/50 - 113/58)  BP(mean): --  ABP: --  ABP(mean): --  RR: 16 (05 Jun 2020 06:18) (16 - 16)  SpO2: 97% (05 Jun 2020 06:18) (97% - 100%)
T(C): 36.9 (05-31-20 @ 10:24), Max: 37.1 (05-30-20 @ 13:45)  HR: 83 (05-31-20 @ 10:24) (71 - 83)  BP: 92/61 (05-31-20 @ 10:24) (84/54 - 95/58)  RR: 15 (05-31-20 @ 10:24) (15 - 18)  SpO2: 99% (05-31-20 @ 10:24) (97% - 100%)  Wt(kg): --
Vital Signs Last 24 Hrs  T(C): 36.4 (01 Jun 2020 12:39), Max: 36.7 (31 May 2020 21:08)  T(F): 97.5 (01 Jun 2020 12:39), Max: 98 (31 May 2020 21:08)  HR: 84 (01 Jun 2020 12:39) (79 - 84)  BP: 94/64 (01 Jun 2020 12:39) (84/50 - 96/58)  BP(mean): --  RR: 16 (01 Jun 2020 12:39) (15 - 16)  SpO2: 98% (01 Jun 2020 12:39) (98% - 99%)
ICU Vital Signs Last 24 Hrs  T(C): 36.4 (03 Jun 2020 13:14), Max: 36.8 (02 Jun 2020 22:17)  T(F): 97.6 (03 Jun 2020 13:14), Max: 98.3 (02 Jun 2020 22:17)  HR: 89 (03 Jun 2020 13:14) (83 - 89)  BP: 88/55 (03 Jun 2020 13:14) (88/54 - 92/64)  BP(mean): --  ABP: --  ABP(mean): --  RR: 16 (03 Jun 2020 07:15) (16 - 16)  SpO2: 100% (03 Jun 2020 13:14) (99% - 100%)
ICU Vital Signs Last 24 Hrs  T(C): 36.5 (04 Jun 2020 05:45), Max: 36.7 (03 Jun 2020 20:10)  T(F): 97.7 (04 Jun 2020 05:45), Max: 98.1 (03 Jun 2020 20:10)  HR: 80 (04 Jun 2020 05:45) (79 - 80)  BP: 91/62 (04 Jun 2020 05:45) (91/62 - 97/65)  BP(mean): --  ABP: --  ABP(mean): --  RR: 17 (04 Jun 2020 05:45) (16 - 17)  SpO2: 98% (04 Jun 2020 05:45) (98% - 100%)
Vital Signs Last 24 Hrs  T(C): 36.8 (30 May 2020 06:13), Max: 36.9 (29 May 2020 12:39)  T(F): 98.3 (30 May 2020 06:13), Max: 98.5 (29 May 2020 12:39)  HR: 90 (30 May 2020 11:53) (85 - 96)  BP: 86/54 (30 May 2020 11:53) (86/54 - 93/61)  BP(mean): --  RR: 18 (30 May 2020 11:53) (15 - 18)  SpO2: 100% (30 May 2020 11:53) (99% - 100%)
Vital Signs Last 24 Hrs  T(C): 36.9 (02 Jun 2020 13:18), Max: 36.9 (02 Jun 2020 13:18)  T(F): 98.5 (02 Jun 2020 13:18), Max: 98.5 (02 Jun 2020 13:18)  HR: 87 (02 Jun 2020 13:18) (80 - 88)  BP: 99/67 (02 Jun 2020 13:18) (90/60 - 99/67)  BP(mean): --  RR: 16 (02 Jun 2020 13:18) (15 - 16)  SpO2: 98% (02 Jun 2020 13:18) (98% - 100%)

## 2020-06-05 NOTE — PROGRESS NOTE BEHAVIORAL HEALTH - NSBHFUPINTERVALHXFT_PSY_A_CORE
Evening dose of clozapine held yesterday due to low BP. Nurse notes having observed her curled up and sucking her thumb this morning.     Pt reports feeling significantly better since yesterday. Reports less anxiety, mood improved and more stable from before. Plans to continue with her current psych NP, interested in follow-up with [facility] though understanding there to be a waitlist so uncertain of whether and how soon she could follow up. She feels comfortable with medical workup, treatment and clearance provided. She expresses preference for discharge home rather than transfer for psych hospitalization, consistent with expressed preference yesterday.     Discussed outpatient follow-up with Dr Wright of mood disorder clinic at [facility], who on review judged the primary needs to be related to BPD and less clear evidence at present for BPAD. Evening dose of clozapine held yesterday due to low BP. Nurse notes having observed her curled up and sucking her thumb this morning.     Pt reports feeling significantly better since yesterday. Reports less anxiety, mood improved and more stable from before. Plans to continue with her current psych NP, interested in follow-up with [facility] though understanding there to be a waitlist so uncertain of whether and how soon she could follow up. She feels comfortable with medical workup, treatment and clearance provided. She expresses preference for discharge home rather than transfer for psych hospitalization, consistent with expressed preference yesterday. Discussed again about possible voluntary admission, which pt. declined again.     Discussed outpatient follow-up with Palak Lemus () [phone]  at Bipolar clinic at [facility], who requested to send an email with pt. information, case was reviewed by Saskia Kee at the Bipolar clinic, who on review judged that pt.'s sxs are better explained by her axis II pathology and pt. is  not  appropriate for bipolar clinic.

## 2020-06-05 NOTE — PROGRESS NOTE ADULT - PROBLEM SELECTOR PLAN 7
Psych recommending voluntary inpatient [facility] admission, but pt is ambivalent and mother refusing. Extensive discussion with mother and pt today by [facility] attending Dr. Avila and psych team. Will have nutrition see pt as requested by mother. Aiming for discharge home today with f/u with Jenni as outpt    1.  Name of PCP:  2.  PCP Contacted on Admission: [ ] Y    [x] N  Night admission  3.  PCP contacted at Discharge: [ ] Y    [ ] N    [ ] N/A  4.  Post-Discharge Appointment Date and Location:  5.  Summary of Handoff given to PCP: Improve score 0.  Low risk for VTE.

## 2020-06-05 NOTE — PROGRESS NOTE ADULT - PROBLEM SELECTOR PLAN 4
-Large UOP over last 24H  -F/u outpatient urologist -UCx enterococci  -C/w with 5d of augmentin (6/1 - 6/5)

## 2020-06-05 NOTE — PROGRESS NOTE BEHAVIORAL HEALTH - CASE SUMMARY
Patient seen and evaluated, I agree with above assessment and plan, Pt. AAOX4, more alert and engaging well, reported mood is better than yesterday, expressed frustration due to current acute medical issues. Thought process is linear and coherent. Pt. denies acute psychotic ort manic sxs. Patient adamantly denies  passive or active SI/I P, denies HI. Recommend to continue meds. as written above, monitor EKG for qtc, outpt. follow up and [facility] referrals as above, case discussed with Dr. Avila. Will follow

## 2020-06-05 NOTE — PROGRESS NOTE BEHAVIORAL HEALTH - NSBHCONSULTRECOMMENDOTHER_PSY_A_CORE FT
f/u Drae Bailey NP (outpatient psych NP, [phone])  @ [facility] on 6/8/20      Addiotional referrals;  [facility] Out[facility] Walk In crisis Clinic [phone](9a-7p)  [facility] Outpatient clinic: [phone] Current psychiatric medications discussed with the patient in detail, discussed the reason of medication titration/modification, she verbalized understanding and agreed with the plan.

## 2020-06-05 NOTE — PROGRESS NOTE ADULT - PROBLEM SELECTOR PLAN 1
Spot EEG showing areas of epileptiform activity however no seizures; Per vEEG: single focus of hyperexcitability. 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 She is clear for discharge from neuro standpoint  -Off nighttime ativan  -hold Welbutrin and Adderall as they lower seizure threshold, additional med changes per psych  -q4h neurochecks  -Epilepsy clinic f/u Orthostatic by HR overnight, however per pt she has baseline low BP which has been documented during multiple hospitalizations, and she has never experienced symptoms or falls  - LR @ 75cc/hr today  - Recheck orthostatics  - Still clear for discharge today

## 2020-06-05 NOTE — PROGRESS NOTE BEHAVIORAL HEALTH - NSBHCONSULTFOLLOWAFTERCARE_PSY_A_CORE FT
f/u Drea Bailey NP (outpatient psych NP, [phone])  @ [facility] on 6/8/20      Additional referrals;  [facility] Outpatient Walk In crisis Clinic [phone](9a-7p)  [facility] Outpatient clinic: [phone] f/u Drea Bailey NP (outpatient psych NP, [phone])  @ [facility] on 6/8/20      Additional referrals;  [facility] Outpatient Walk In crisis Clinic [phone](9a-7p)  [facility] Outpatient clinic: [phone]      *****As pt. and mother wants to follow up at [facility] oupt. clinic, tried to set up an appointment at [facility] Bipolar clinic [phone], however unable to get an appointment (See above for details) *****

## 2020-06-05 NOTE — PROGRESS NOTE ADULT - NSHPATTENDINGPLANDISCUSS_GEN_ALL_CORE
neurology resident
patient and neurology resident
Arturo Hernandez MD
Arturo Hernandez MD
Pernell Linda MD and Kyle ChapmanBayley Seton Hospitalalexa CASTILLO
Kyle Hernandez MD
Arturo Hernandez MD
Kyle Hernandez MD

## 2020-06-05 NOTE — PROGRESS NOTE BEHAVIORAL HEALTH - ABNORMAL MOVEMENTS
No abnormal movements
Other/No abnormal movements
No abnormal movements

## 2020-06-05 NOTE — PROGRESS NOTE BEHAVIORAL HEALTH - NSBHATTESTSEENBY_PSY_A_CORE
Attending Psychiatrist supervising NP/Trainee, meeting pt...
Attending Psychiatrist supervising NP/Trainee, meeting pt...
attending Psychiatrist without NP/Trainee
Attending Psychiatrist supervising NP/Trainee, meeting pt...
attending Psychiatrist without NP/Trainee
Attending Psychiatrist supervising NP/Trainee, meeting pt...
Attending Psychiatrist supervising NP/Trainee, meeting pt...

## 2020-06-05 NOTE — DISCHARGE NOTE NURSING/CASE MANAGEMENT/SOCIAL WORK - PATIENT PORTAL LINK FT
You can access the FollowMyHealth Patient Portal offered by [facility] by registering at the following website: [email or web address]. By joining [facility]’s FollowMyHealth portal, you will also be able to view your health information using other applications (apps) compatible with our system.

## 2020-06-05 NOTE — PROGRESS NOTE ADULT - PROBLEM SELECTOR PLAN 3
-UCx enterococci  -C/w with 5d of augmentin (6/1 - 6/5) -d/c Wellbutrin and Adderall due to current concerns of seizures  -Off Luvox  -c/w Clozapine 150mg qHS w/hold parameters, monitor WBC's, ANCs, monitor orthostatics  -c/w Ativan 2mg HS w/nightly bedchecks, monitor for sedation, hold if RR<12  -c/w Lithium 300mg daily, 600mg HS-monitor BUN/CR  -Ativan 2mg PO/IM/IV Q6H PRN for agitation, monitor for sedation, hold if RR<12  -F/u [facility]

## 2020-06-05 NOTE — PROGRESS NOTE BEHAVIORAL HEALTH - NSBHADMITCOUNSEL_PSY_A_CORE
client/family/caregiver education/risks and benefits of treatment options/importance of adherence to chosen treatment

## 2020-06-05 NOTE — PROGRESS NOTE BEHAVIORAL HEALTH - SUMMARY
26 yo , single, female residing with roommate with PMHx urinary incontinence, PSHx cholecystectomy, as per chart, PPHx bipolar disorder with psychotic features, SIB BPD, MDD, dysthymic disorder, presents to [facility] for c/o forgetfulness, twitching/shaking, eye droop and abnormal gait for last month. [facility] psychiatry consulted for medication management.    Psych [facility] discontinued wellbutrin and adderall and decreased clozapine as they can lower seizure threshold, clozapine now appropriate for gradual titration with care to avoid seizure precipitation. Agree with neuro rec to discontinue lorazepam for possible seizure destabilization.     5/30 - Today's lithium level is therepeutic.  In the setting of concern for possible epileptiform activity would recommend the following: (will lower clozapine, will stop trazodone, will ask for another lithium level tomorrow, address medical/neurologic issues)    5/31 - pt lethargic, unable to engage in assessment, likely multifactorial (discontinuation of stimulating agents (adderall/wellbutrin), UTI/delirium). Lithium level within therapeutic window this AM. See recs below     6/1 - much more alert/awake since yesterday's lethargy, possibly related to adjustment s/p holding stimulant, bupropion, trazodone,  possibly related to improvement in sz activity if improved in response to med changes.     6/2 - more fatigued than yesterday's exam though easily aroused and cooperative, still forgetting information communicated. Received last night's clozapine since BP was wnl, lorazepam held due to lethargy. EEG suggestive of seizure activity. Pending discussion with outpatient psych NP (left VM for her).    6/3; EEG results reviewed, pt. did not get any PRNs, standing ATivan held due to sedation. Patient seen and evaluated. seems tired, able to engage well in interview, AAOX3, she denies significant anxiety. She remains future-oriented. Denies acute psychotic sxs, adamantly denies SI and HI.     6/04; Today's exam appears more alert though newly sad/depressed since yesterday, though she was easily aroused and cooperative with exam, improved recall of recent communication. More accepting of lorazepam being held though expressing distress at having it and the trazodone held not because she can't sleep rather because she prefers to feel somewhat numb. Discussed possibility of voluntary psychiatric hospitalization, pt expressing ambivalence, per pt's request discussed with mother who is adamantly opposed, working with both to plan disposition.     -Recommend voluntary psychiatric hospitalization at [facility] for med optimization and acute mood changes in setting of significant med changes this hospitalization which may continue to worsen though pt deferring to mother who is adamantly opposed and she does not presently meet criteria for involuntary psychiatric hospitalization given absence of evidence of risk of harm to self.    PLAN:  -Continue to hold adderall/wellbutrin as these may lower seizure threshold  -Continue to hold trazodone 200mg qhs due to sedation  -Continued to hold fluvoxamine (end of taper)  -Continue Clozapine 150mg qhs, would increase gradually now that seizures resolved and on antiepileptic though holding until disposition question addressed  -Monitor ANC, orthostatics   -Ativan 2mg HS- has been discontinued as pt. has been sedated for the past few day. Agree to HOLD   -c/w Lithium 300mg daily, 600mg HS-monitor BUN/CR - Monitor Lithium level, level on 5/31 0.89  -Ativan 2mg PO/IM/IV Q6H PRN -agitation, monitor for sedation, hold if RR<12  -Monitor EKG qtc interval  -Obtain B-HCG  -f/u discussion re disposition, possible voluntary psych hospitalization    Case discussed with medical team    Will follow 24 yo , single, female residing with roommate with PMHx urinary incontinence, PSHx cholecystectomy, as per chart, PPHx bipolar disorder with psychotic features, SIB BPD, MDD, dysthymic disorder, presents to [facility] for c/o forgetfulness, twitching/shaking, eye droop and abnormal gait for last month. [facility] psychiatry consulted for medication management.    Psych CL discontinued wellbutrin and adderall and decreased clozapine as they can lower seizure threshold, clozapine now appropriate for gradual titration with care to avoid seizure precipitation. Agree with neuro rec to discontinue lorazepam for possible seizure destabilization.     5/30 - Today's lithium level is therepeutic.  In the setting of concern for possible epileptiform activity would recommend the following: (will lower clozapine, will stop trazodone, will ask for another lithium level tomorrow, address medical/neurologic issues)    5/31 - pt lethargic, unable to engage in assessment, likely multifactorial (discontinuation of stimulating agents (adderall/wellbutrin), UTI/delirium). Lithium level within therapeutic window this AM. See recs below     6/1 - much more alert/awake since yesterday's lethargy, possibly related to adjustment s/p holding stimulant, bupropion, trazodone,  possibly related to improvement in sz activity if improved in response to med changes.     6/2 - more fatigued than yesterday's exam though easily aroused and cooperative, still forgetting information communicated. Received last night's clozapine since BP was wnl, lorazepam held due to lethargy. EEG suggestive of seizure activity. Pending discussion with outpatient psych NP (left VM for her).    6/3; EEG results reviewed, pt. did not get any PRNs, standing ATivan held due to sedation. Patient seen and evaluated. seems tired, able to engage well in interview, AAOX3, she denies significant anxiety. She remains future-oriented. Denies acute psychotic sxs, adamantly denies SI and HI.     6/04; Today's exam appears more alert though newly sad/depressed since yesterday, though she was easily aroused and cooperative with exam, improved recall of recent communication. More accepting of lorazepam being held though expressing distress at having it and the trazodone held not because she can't sleep rather because she prefers to feel somewhat numb. Discussed possibility of voluntary psychiatric hospitalization, pt expressing ambivalence, per pt's request discussed with mother who is adamantly opposed, working with both to plan disposition.     -Recommend voluntary psychiatric hospitalization at [facility] for med optimization and acute mood changes in setting of significant med changes this hospitalization which may continue to worsen though pt deferring to mother who is adamantly opposed and she does not presently meet criteria for involuntary psychiatric hospitalization given absence of evidence of risk of harm to self.    6/5  Clozapine held last night due to low BP. No apparent depression since yesterday.     PLAN:  -Lithium 300mg daily, 600mg HS-monitor BUN/CR      - Monitor Lithium level, level on 5/31 0.89  -Clozapine 150mg qhs, additional titration outpatient, see hold parameters     -Monitor ANC, orthostatics   -PRN: Ativan 2mg PO/IM/IV Q6H PRN -agitation, monitor for sedation, hold if RR<12  -DISCONTINUED: Ativan 2mg HS- has been discontinued for sedation and sz risk  -DISCONTINUED: Trazodone 200mg qhs due to sedation  -DISCONTINUED: Fluvoxamine (end of taper)  -DISCONTINUED: Adderall/wellbutrin as these may lower seizure threshold  -Monitor EKG qtc interval    Dispo: Offered voluntary inpatient psychiatric admission though pt refused and mother in agreement with pt. Does not meet criteria for acute involuntary hospitalization. Advised (pt understands and agrees) close f/u with outpt psych provider (has f/u appointment she will keep), pt will f/u interest in transferring care to [facility]    Case discussed with medical team 26 yo , single, female residing with roommate with PMHx urinary incontinence, PSHx cholecystectomy, as per chart, PPHx bipolar disorder with psychotic features, SIB BPD, MDD, dysthymic disorder, presents to [facility] for c/o forgetfulness, twitching/shaking, eye droop and abnormal gait for last month. [facility] psychiatry consulted for medication management.    Psych CL discontinued wellbutrin and adderall and decreased clozapine as they can lower seizure threshold, clozapine now appropriate for gradual titration with care to avoid seizure precipitation. Agree with neuro rec to discontinue lorazepam for possible seizure destabilization.     5/30 - Today's lithium level is therepeutic.  In the setting of concern for possible epileptiform activity would recommend the following: (will lower clozapine, will stop trazodone, will ask for another lithium level tomorrow, address medical/neurologic issues)    5/31 - pt lethargic, unable to engage in assessment, likely multifactorial (discontinuation of stimulating agents (adderall/wellbutrin), UTI/delirium). Lithium level within therapeutic window this AM. See recs below     6/1 - much more alert/awake since yesterday's lethargy, possibly related to adjustment s/p holding stimulant, bupropion, trazodone,  possibly related to improvement in sz activity if improved in response to med changes.     6/2 - more fatigued than yesterday's exam though easily aroused and cooperative, still forgetting information communicated. Received last night's clozapine since BP was wnl, lorazepam held due to lethargy. EEG suggestive of seizure activity. Pending discussion with outpatient psych NP (left VM for her).    6/3; EEG results reviewed, pt. did not get any PRNs, standing ATivan held due to sedation. Patient seen and evaluated. seems tired, able to engage well in interview, AAOX3, she denies significant anxiety. She remains future-oriented. Denies acute psychotic sxs, adamantly denies SI and HI.     6/04; Today's exam appears more alert though newly sad/depressed since yesterday, though she was easily aroused and cooperative with exam, improved recall of recent communication. More accepting of lorazepam being held though expressing distress at having it and the trazodone held not because she can't sleep rather because she prefers to feel somewhat numb. Discussed possibility of voluntary psychiatric hospitalization, pt expressing ambivalence, per pt's request discussed with mother who is adamantly opposed, working with both to plan disposition.     -Recommend voluntary psychiatric hospitalization at [facility] for med optimization and acute mood changes in setting of significant med changes this hospitalization which may continue to worsen though pt deferring to mother who is adamantly opposed and she does not presently meet criteria for involuntary psychiatric hospitalization given absence of evidence of risk of harm to self.    6/5; Clozapine held last night due to low BP. No apparent depression since yesterday. No SI and HI. denies acute psychotic or manic sxs, declined Voluntary admission.      PLAN:  -Lithium 300mg daily, 600mg HS-monitor BUN/CR      - Monitor Lithium level, level on 5/31 0.89  -Clozapine 150mg qhs, additional titration outpatient, see hold parameters     -Monitor ANC, orthostatics   -PRN: Ativan 2mg PO/IM/IV Q6H PRN -agitation, monitor for sedation, hold if RR<12  -DISCONTINUED: Ativan 2mg HS- has been discontinued for sedation and sz risk  -DISCONTINUED: Trazodone 200mg qhs due to extreme sedation  -DISCONTINUED: Fluvoxamine (end of taper)  -DISCONTINUED: Adderall/wellbutrin as these may lower seizure threshold  -Monitor EKG qtc interval, BUN/CR, Lithium level    Dispo: Offered voluntary inpatient psychiatric admission though pt refused and mother in agreement with pt. Does not meet criteria for acute involuntary hospitalization. Advised (pt understands and agrees) close f/u with outpt psych provider (has f/u appointment she will keep), pt will f/u interest in transferring care to [facility]    Case discussed with medical team

## 2020-06-05 NOTE — PROVIDER CONTACT NOTE (OTHER) - ASSESSMENT
no complains at present. frequency urination throughout this shift
no distress noted patient ambulate in hallway without diff.
No s/s of acute distress. Vitals stable. +2 pupillary reaction noted for both eyes. Patient followed commands. AOx4. Patient falls back asleep right away while talking with her.
Patient c/o constipation, last BM 05/28. Abdomen soft, nontender. Positive bowel sounds.
Pt resting in bed. No c/o or s/s of distress.
See above. When patient asked if she remember the month, she said, "March." When asked the year, patient stated, "February." Patient lethargic but arousable.

## 2020-06-05 NOTE — PROGRESS NOTE BEHAVIORAL HEALTH - RISK ASSESSMENT
Per collateral had been doing quite well prior to this hospitalization. Reports anxiety and some residual depression but no SI, no AH/VH, no other persistent psychotic symptoms. Reports medication compliance.    Patient is not at acute risk of harm to self or others at this time, does not meet criteria for involuntary psychiatric admission. Per collateral had been doing quite well prior to this hospitalization. Reports anxiety and some residual depression but no SI, no AH/VH, no other persistent psychotic symptoms. Reports medication compliance. Has outpt. provider, denies passive or active SI, denies manic or hypomanic sxs, future oriented, wants to see outpt provider.  Risk factors; h/o cutting, h/o aggression, h/o multiple psych. admission, current acute medical issues,    Pt. was offered Voluntary admission yesterday and today, pt. declined.   Patient is not at acute risk of harm to self or others at this time, does not meet criteria for involuntary psychiatric admission.

## 2020-06-05 NOTE — PROGRESS NOTE BEHAVIORAL HEALTH - NSBHCHARTREVIEWLAB_PSY_A_CORE FT
11.9   7.79  )-----------( 363      ( 04 Jun 2020 05:49 )             34.1       06-04    139  |  107  |  16  ----------------------------<  94  3.7   |  19<L>  |  0.69    Ca    9.3      04 Jun 2020 05:49  Phos  3.2     06-04  Mg     2.2     06-04 12.1   8.58  )-----------( 349      ( 05 Jun 2020 06:30 )             34.9     06-05    141  |  109<H>  |  12  ----------------------------<  91  4.0   |  18<L>  |  0.55    Ca    9.2      05 Jun 2020 06:30  Phos  2.3     06-05  Mg     2.3     06-05

## 2020-06-05 NOTE — PROGRESS NOTE ADULT - PROBLEM SELECTOR PLAN 2
-d/c Wellbutrin and Adderall due to current concerns of seizures  -Off Luvox  -c/w Clozapine 150mg qHS w/hold parameters, monitor WBC's, ANCs, monitor orthostatics  -c/w Ativan 2mg HS w/nightly bedchecks, monitor for sedation, hold if RR<12  -c/w Lithium 300mg daily, 600mg HS-monitor BUN/CR  -Ativan 2mg PO/IM/IV Q6H PRN for agitation, monitor for sedation, hold if RR<12  -F/u [facility] Spot EEG showing areas of epileptiform activity however no seizures; Per vEEG: single focus of hyperexcitability. 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 She is clear for discharge from neuro standpoint  -Off nighttime ativan  -hold Welbutrin and Adderall as they lower seizure threshold, additional med changes per psych outpatient  -Epilepsy clinic f/u

## 2020-06-05 NOTE — PROGRESS NOTE ADULT - REASON FOR ADMISSION
CC: Stuttering

## 2020-06-05 NOTE — PROGRESS NOTE ADULT - PROBLEM SELECTOR PLAN 8
Psych recommending voluntary inpatient [facility] admission, but pt is ambivalent and mother refusing. Extensive discussion with mother and pt today by [facility] attending Dr. Avila and psych team. Will have nutrition see pt as requested by mother. Aiming for discharge home today with f/u with [facility] as outpt    1.  Name of PCP:  2.  PCP Contacted on Admission: [ ] Y    [x] N  Night admission  3.  PCP contacted at Discharge: [ ] Y    [ ] N    [ ] N/A  4.  Post-Discharge Appointment Date and Location:  5.  Summary of Handoff given to PCP:

## 2020-06-05 NOTE — DISCHARGE NOTE NURSING/CASE MANAGEMENT/SOCIAL WORK - NSDCFUADDAPPT_GEN_ALL_CORE_FT
Please make an appointment with:  - Your primary care doctor, within 2 weeks  - An epilepsy specialist, within 1 week. You may choose Dr. Lerner, Dr. Torres, or Dr. Johnson. All are located at [address] [place] [address], in [place]. You can call [phone] to make an appointment.   - Your urologist, as needed, for your urinary incontinence    Our psychiatry department has requested an appointment for you with the bipolar disease clinic.

## 2020-06-05 NOTE — PROGRESS NOTE ADULT - ASSESSMENT
25F PMH bipolar disorder, borderline personality disorder, MDD, urinary incontinence, recent cholecystectomy for acute cholecystitis who presents for worsening urinary incontinence and abdominal pain, with other vague complaints, likely related to post-op pain and profound polypharmacy. 25F PMH bipolar disorder, borderline personality disorder, MDD, urinary incontinence, recent cholecystectomy for acute cholecystitis who presents for worsening urinary incontinence and abdominal pain, with other vague complaints, likely related to post-op pain and profound polypharmacy, s/p medication adjustments. Found to have new diagnosis of epilepsy, now on AED. Stable for discharge.

## 2020-06-08 ENCOUNTER — TRANSCRIPTION ENCOUNTER (OUTPATIENT)
Age: 25
End: 2020-06-08

## 2020-06-08 PROBLEM — R32 UNSPECIFIED URINARY INCONTINENCE: Chronic | Status: ACTIVE | Noted: 2020-05-28

## 2020-06-09 ENCOUNTER — APPOINTMENT (OUTPATIENT)
Dept: FAMILY MEDICINE | Facility: CLINIC | Age: 25
End: 2020-06-09

## 2020-06-17 ENCOUNTER — APPOINTMENT (OUTPATIENT)
Dept: TRAUMA SURGERY | Facility: CLINIC | Age: 25
End: 2020-06-17
Payer: MEDICAID

## 2020-06-17 ENCOUNTER — APPOINTMENT (OUTPATIENT)
Age: 25
End: 2020-06-17
Payer: COMMERCIAL

## 2020-06-17 VITALS
SYSTOLIC BLOOD PRESSURE: 103 MMHG | HEART RATE: 98 BPM | HEIGHT: 64 IN | DIASTOLIC BLOOD PRESSURE: 71 MMHG | WEIGHT: 165 LBS | BODY MASS INDEX: 28.17 KG/M2

## 2020-06-17 VITALS
DIASTOLIC BLOOD PRESSURE: 75 MMHG | HEART RATE: 103 BPM | HEIGHT: 64 IN | BODY MASS INDEX: 28.68 KG/M2 | SYSTOLIC BLOOD PRESSURE: 97 MMHG | WEIGHT: 168 LBS

## 2020-06-17 VITALS — TEMPERATURE: 97.8 F

## 2020-06-17 DIAGNOSIS — K81.1 CHRONIC CHOLECYSTITIS: ICD-10-CM

## 2020-06-17 PROCEDURE — 99215 OFFICE O/P EST HI 40 MIN: CPT

## 2020-06-17 PROCEDURE — 99024 POSTOP FOLLOW-UP VISIT: CPT

## 2020-06-17 RX ORDER — DOCUSATE SODIUM 100 MG
100 CAPSULE ORAL
Refills: 0 | Status: DISCONTINUED | COMMUNITY
End: 2020-06-17

## 2020-06-17 RX ORDER — BUPROPION HYDROCHLORIDE 75 MG/1
75 TABLET, FILM COATED ORAL
Refills: 0 | Status: DISCONTINUED | COMMUNITY
End: 2020-06-17

## 2020-06-17 RX ORDER — MULTIVITAMIN
TABLET ORAL
Refills: 0 | Status: DISCONTINUED | COMMUNITY
End: 2020-06-17

## 2020-06-17 RX ORDER — LUBIPROSTONE 24 UG/1
24 CAPSULE, GELATIN COATED ORAL
Refills: 0 | Status: DISCONTINUED | COMMUNITY
End: 2020-06-17

## 2020-06-17 RX ORDER — CLOZAPINE 50 MG/1
50 TABLET ORAL DAILY
Qty: 90 | Refills: 0 | Status: ACTIVE | COMMUNITY

## 2020-06-17 RX ORDER — DEXTROAMPHETAMINE SACCHARATE, AMPHETAMINE ASPARTATE MONOHYDRATE, DEXTROAMPHETAMINE SULFATE AND AMPHETAMINE SULFATE 1.25; 1.25; 1.25; 1.25 MG/1; MG/1; MG/1; MG/1
5 CAPSULE, EXTENDED RELEASE ORAL
Refills: 0 | Status: DISCONTINUED | COMMUNITY
End: 2020-06-17

## 2020-06-17 RX ORDER — LITHIUM CARBONATE 300 MG/1
300 CAPSULE ORAL
Qty: 90 | Refills: 0 | Status: ACTIVE | COMMUNITY
Start: 2017-07-11

## 2020-06-17 RX ORDER — FLUVOXAMINE MALEATE 100 MG/1
100 TABLET, FILM COATED ORAL
Refills: 0 | Status: DISCONTINUED | COMMUNITY
End: 2020-06-17

## 2020-06-17 RX ORDER — MIRABEGRON 50 MG/1
50 TABLET, FILM COATED, EXTENDED RELEASE ORAL
Refills: 0 | Status: DISCONTINUED | COMMUNITY
End: 2020-06-17

## 2020-06-17 RX ORDER — TRAZODONE HYDROCHLORIDE 100 MG/1
100 TABLET ORAL
Refills: 0 | Status: DISCONTINUED | COMMUNITY
End: 2020-06-17

## 2020-06-17 RX ORDER — LORAZEPAM 2 MG/1
2 TABLET ORAL AT BEDTIME
Refills: 0 | Status: ACTIVE | COMMUNITY
Start: 2017-07-11

## 2020-06-17 RX ORDER — NORETHINDRONE AND ETHINYL ESTRADIOL 1 MG-35MCG
1-35 KIT ORAL DAILY
Qty: 3 | Refills: 3 | Status: DISCONTINUED | COMMUNITY
Start: 2017-06-29 | End: 2020-06-17

## 2020-06-17 RX ORDER — CLOZAPINE 100 MG/1
100 TABLET ORAL
Refills: 0 | Status: DISCONTINUED | COMMUNITY
End: 2020-06-17

## 2020-06-17 RX ORDER — DEXTROAMPHETAMINE SACCHARATE AND AMPHETAMINE ASPARTATE AND DEXTROAMPHETAMINE SULFATE AND AMPHETAMINE SULFATE 2.5; 2.5; 2.5; 2.5 MG/1; MG/1; MG/1; MG/1
10 TABLET ORAL
Refills: 0 | Status: DISCONTINUED | COMMUNITY
End: 2020-06-17

## 2020-06-17 NOTE — ASSESSMENT
[FreeTextEntry1] : Ms. COOPER presents with finding of abnormal EEG. By description, the abnormal mentation/lethargy that led to the study was not likely from seizure and in any event, no seizure were detected over several days of EEG recording.  Given the history of seizures in her brother, which he "outgrew", it seems possible that Ms. COOPER has a genetic susceptibility but has never had a seizure.  We discussed seizure safety, avoiding ladders, swimming precautions.  We also discussed driving.  As [place] State law does not address epileptiform EEG findings and Ms. COOPER has never had a seizure, I did not advise that she should stop driving. I did  her that we would again review safety issues after repeat amb EEG. \par \par 1. no AED for now\par 2. repeat amb 24h EEG\par 3. RTC 6-8 wks .\par 4. seizure safety and risk reduction discussed. \par \par I have spent 45 minutes of face to face time with the patient reviewing the cause of seizures or seizure-like events, assessing the risk of recurrence, educating the patient or family to recognize seizures, and discussing possible treatment options and possible side effects of seizure medications. I also discussed seizure safety, and ways of reducing seizure risk. Greater than 50% of the encounter time was spent on counseling and coordination of care for reviewing records in [employer], discussion with patient regarding plan.

## 2020-06-17 NOTE — PHYSICAL EXAM
[FreeTextEntry1] : MS: alert & oriented to person, place time, good fund of knowledge and recall for recent and remote events. \par CN: VFF to confrontation, EOM full without nystagmus, PERRL, V1-V3 intact to light touch, face symmetrical, hears finger rub bilaterally, palate elevates symmetrically, shoulders elevate symmetrically, tongue midline\par MOTOR: normal tone x 4 extremities, Power 5/5 proximally and distally bilaterally, no drift, normal rapid alternating movements. \par SENSORY: intact LT x 4 extremities\par REFLEXES: biceps 2+ bilaterally, triceps 2+ bilaterally, brachioradialis 2+ bilaterally, patella 2+ bilaterally, ankle 2+ bilaterally, plantar flexor bilaterally\par COORD: normal FNF, no tremor or dysmetria\par GAIT: normal base, Romberg negative, normal gait, able to walk on toes, heels and tandem.

## 2020-06-17 NOTE — HISTORY OF PRESENT ILLNESS
[FreeTextEntry1] : *** 06/17/2020  ***\par Ms. COOPER presents for follow-up after she was found to have abnormal EEG in recent hospitalization.  She was prescribed zonisamide for abnormal EEG, titrated rapidly to 300/day, and presented to ophthalmologist with complaint of blurry vision. She was instructed to stop the zonisamide by her ophthalmologist, and her vision has cleared up.  Ms. COOPER has not had clear convulsive or non-convulsive seizures either before or after her hospitalization.  Since discharge, Ms. COOPER's mother has remarked on brief staring, but not clear that these events are seizures. Review of [facility] EEG shows marked discharges are less than 1 sec, with no sustained discharges. \par \par Of interest, Ms. COOPER's brother was diagnosed with epilepsy at age 7.  He is 2 years younger than Ms. COOPER.  He had an ambulatory EEG a few years ago and was told that it was negative, and is no longer taking an AED. \par \par *** 06/05/2020 DC Summary from [facility] Admission ***\par \par 25 year old woman with a hx of bipolar disorder, borderline personality \par disorder, urinary incontinence, and recent cholecystectomy for acute \par cholecystitis who presents for stuttering, forgetfulness and abnormal gait. \par \par Patient is a poor historian at time of evaluation. \par \par She reports she presented to the hospital for "a lot of different things."  On \par further questioning, she focuses mainly on her stuttering and gait and memory \par issues that have been ongoing for about 1 month  She denies any significant \par abdominal pain, nausea, vomiting, fevers prior to arrival.  She was reportedly \par previously evaluated by outpatient psychiatry with addition of medications. \par \par In the ED, she had a tmax of 100.1, mild tachycardia to 101, and baseline soft \par BPs in the 90-100s/50-60s based on prior records.  Initial labs showed a \par leukocytosis downtrending from prior admission.  Neurology evaluated and \par recommended outpatient EEG for possible seizure but was most concerned that \par neurologic symptoms related to polypharmacy or delirium.  She had abdominal \par pain, nausea and vomiting in the ED and a CT a/p and abd ultrasound showed \par concern for fluid collection in the gallbladder fossa possibly from hematoma or \par abscess.  Surgery evaluated and felt that the imaging findings could just be \par normal post surgical findings and recommended against antibiotics.  She was \par given empiric zosyn and then macrobid, fluids, zofran, and toradol and admitted \par for further evaluation. \par \par Hospital Course \par Pt was evaluated by neurology, to whom she described episodes of "spacing out". \par Given she was on multiple meds which reduced the seizure threshold, EEG \par performed, which showed no seizure but did show generalized and focal \par epileptiform activity. Pt started on zonisamide. MRI performed to locate \par possible epileptogenic focus, but was negative for such, and 24hr vEEG \par performed, which showed one focus of hyperactivity but no chavez seizure. 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 Zonisamide titrated to target dose \par of 300mg qhs. Pt will continue on this dose after discharge. \par Psych consulted for polypharmacy and several medication changes made, including \par stopping meds such as adderall which could lower the seizure threshold; \par clozapine reduced for same reason. Pt initially sedated following changes, but \par improved and returned to her baseline mental status. Psych recommended \par voluntary admission to [facility] for further medication titration, however patient \par and mother refused thus plan made for outpatient follow-up at [facility]. Numbers \par provided for [facility] outpatient clinic and [facility] Crisis Clinic. \par While initially off abx given her equivocal UA, UCx grew enterococcus and pt \par completed 5d course of augmentin. She remained afebrile and asymptomatic. \par Patient's abdominal pain improved without other intervention. \par Patient should follow-up with her PCP, as well as with psych ([facility] outpatient \par clinic) and neuro ([facility] epilepsy clinic). She should continue zonisamide. \par She will be provided with home care to assist with medication management. Due \par to her new diagnosis of epilepsy, pt has the following restrictions: No \par driving, operating heavy machinery, water sports/bathing, activity in elevation \par without appropriate safety precautions

## 2020-06-30 ENCOUNTER — APPOINTMENT (OUTPATIENT)
Dept: DERMATOLOGY | Facility: CLINIC | Age: 25
End: 2020-06-30
Payer: COMMERCIAL

## 2020-06-30 DIAGNOSIS — L81.0 POSTINFLAMMATORY HYPERPIGMENTATION: ICD-10-CM

## 2020-06-30 PROCEDURE — 99213 OFFICE O/P EST LOW 20 MIN: CPT | Mod: 95

## 2020-07-10 ENCOUNTER — APPOINTMENT (OUTPATIENT)
Dept: NEUROLOGY | Facility: CLINIC | Age: 25
End: 2020-07-10
Payer: COMMERCIAL

## 2020-07-10 PROCEDURE — 95816 EEG AWAKE AND DROWSY: CPT

## 2020-07-10 NOTE — PROGRESS NOTE BEHAVIORAL HEALTH - RELATEDNESS
Good O-T Plasty Text: The defect edges were debeveled with a #15 scalpel blade.  Given the location of the defect, shape of the defect and the proximity to free margins an O-T plasty was deemed most appropriate.  Using a sterile surgical marker, an appropriate O-T plasty was drawn incorporating the defect and placing the expected incisions within the relaxed skin tension lines where possible.    The area thus outlined was incised deep to adipose tissue with a #15 scalpel blade.  The skin margins were undermined to an appropriate distance in all directions utilizing iris scissors.

## 2020-07-11 PROCEDURE — 95705 EEG W/O VID 2-12 HR UNMNTR: CPT

## 2020-07-11 PROCEDURE — 95717 EEG PHYS/QHP 2-12 HR W/O VID: CPT

## 2020-07-11 PROCEDURE — 95708 EEG WO VID EA 12-26HR UNMNTR: CPT

## 2020-07-11 PROCEDURE — 95700 EEG CONT REC W/VID EEG TECH: CPT

## 2020-07-11 PROCEDURE — 95719 EEG PHYS/QHP EA INCR W/O VID: CPT

## 2020-07-16 ENCOUNTER — NON-APPOINTMENT (OUTPATIENT)
Age: 25
End: 2020-07-16

## 2020-07-17 ENCOUNTER — APPOINTMENT (OUTPATIENT)
Dept: NEUROLOGY | Facility: CLINIC | Age: 25
End: 2020-07-17
Payer: COMMERCIAL

## 2020-07-17 VITALS
BODY MASS INDEX: 29.02 KG/M2 | DIASTOLIC BLOOD PRESSURE: 72 MMHG | HEIGHT: 64 IN | SYSTOLIC BLOOD PRESSURE: 100 MMHG | WEIGHT: 170 LBS | HEART RATE: 98 BPM

## 2020-07-17 VITALS — TEMPERATURE: 97 F

## 2020-07-17 DIAGNOSIS — Z91.89 OTHER SPECIFIED PERSONAL RISK FACTORS, NOT ELSEWHERE CLASSIFIED: ICD-10-CM

## 2020-07-17 DIAGNOSIS — Z87.898 PERSONAL HISTORY OF OTHER SPECIFIED CONDITIONS: ICD-10-CM

## 2020-07-17 PROCEDURE — 99213 OFFICE O/P EST LOW 20 MIN: CPT
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
## 2020-08-07 ENCOUNTER — APPOINTMENT (OUTPATIENT)
Dept: DERMATOLOGY | Facility: CLINIC | Age: 25
End: 2020-08-07
Payer: COMMERCIAL

## 2020-08-07 ENCOUNTER — RX RENEWAL (OUTPATIENT)
Age: 25
End: 2020-08-07

## 2020-08-07 VITALS — WEIGHT: 170 LBS | BODY MASS INDEX: 29.02 KG/M2 | HEIGHT: 64 IN

## 2020-08-07 DIAGNOSIS — L23.9 ALLERGIC CONTACT DERMATITIS, UNSPECIFIED CAUSE: ICD-10-CM

## 2020-08-07 DIAGNOSIS — R22.0 LOCALIZED SWELLING, MASS AND LUMP, HEAD: ICD-10-CM

## 2020-08-07 PROCEDURE — 99214 OFFICE O/P EST MOD 30 MIN: CPT

## 2020-10-26 PROBLEM — K81.1 CHRONIC CHOLECYSTITIS: Status: ACTIVE | Noted: 2020-10-26

## 2020-10-26 NOTE — ASSESSMENT
[FreeTextEntry1] : s/p lap cholecystectomy for symptomatic cholecystectomy\par \par Without complaints\par Tolerating diet\par Denies fever\par \par AOX3\par Anicteric sclera\par Incision C/D/I\par \par Pathology discussed\par \par PRN follow up\par No restrictions to diet/physical activity\par

## 2020-12-23 PROBLEM — Z01.419 ENCOUNTER FOR ANNUAL ROUTINE GYNECOLOGICAL EXAMINATION: Status: RESOLVED | Noted: 2020-05-22 | Resolved: 2020-12-23

## 2021-01-08 ENCOUNTER — APPOINTMENT (OUTPATIENT)
Dept: DERMATOLOGY | Facility: CLINIC | Age: 26
End: 2021-01-08
Payer: COMMERCIAL

## 2021-01-08 DIAGNOSIS — L03.031 CELLULITIS OF RIGHT TOE: ICD-10-CM

## 2021-01-08 PROCEDURE — 99072 ADDL SUPL MATRL&STAF TM PHE: CPT

## 2021-01-08 PROCEDURE — 99214 OFFICE O/P EST MOD 30 MIN: CPT

## 2021-01-10 ENCOUNTER — TRANSCRIPTION ENCOUNTER (OUTPATIENT)
Age: 26
End: 2021-01-10

## 2021-05-01 ENCOUNTER — TRANSCRIPTION ENCOUNTER (OUTPATIENT)
Age: 26
End: 2021-05-01

## 2021-05-11 ENCOUNTER — TRANSCRIPTION ENCOUNTER (OUTPATIENT)
Age: 26
End: 2021-05-11

## 2021-07-12 ENCOUNTER — TRANSCRIPTION ENCOUNTER (OUTPATIENT)
Age: 26
End: 2021-07-12

## 2021-08-20 ENCOUNTER — APPOINTMENT (OUTPATIENT)
Dept: DERMATOLOGY | Facility: CLINIC | Age: 26
End: 2021-08-20

## 2021-09-06 ENCOUNTER — RX RENEWAL (OUTPATIENT)
Age: 26
End: 2021-09-06

## 2021-10-25 ENCOUNTER — APPOINTMENT (OUTPATIENT)
Dept: DERMATOLOGY | Facility: CLINIC | Age: 26
End: 2021-10-25
Payer: MEDICAID

## 2021-10-25 PROCEDURE — 99212 OFFICE O/P EST SF 10 MIN: CPT

## 2022-01-20 ENCOUNTER — TRANSCRIPTION ENCOUNTER (OUTPATIENT)
Age: 27
End: 2022-01-20

## 2022-02-28 ENCOUNTER — APPOINTMENT (OUTPATIENT)
Dept: GASTROENTEROLOGY | Facility: CLINIC | Age: 27
End: 2022-02-28
Payer: MEDICAID

## 2022-02-28 VITALS
HEIGHT: 64 IN | OXYGEN SATURATION: 98 % | WEIGHT: 152 LBS | TEMPERATURE: 98.4 F | SYSTOLIC BLOOD PRESSURE: 90 MMHG | HEART RATE: 90 BPM | DIASTOLIC BLOOD PRESSURE: 60 MMHG | BODY MASS INDEX: 25.95 KG/M2

## 2022-02-28 DIAGNOSIS — Z83.79 FAMILY HISTORY OF OTHER DISEASES OF THE DIGESTIVE SYSTEM: ICD-10-CM

## 2022-02-28 DIAGNOSIS — F31.10 BIPOLAR DISORDER, CURRENT EPISODE MANIC W/OUT PSYCHOTIC FEATURES, UNSPECIFIED: ICD-10-CM

## 2022-02-28 DIAGNOSIS — R11.0 NAUSEA: ICD-10-CM

## 2022-02-28 PROCEDURE — 99204 OFFICE O/P NEW MOD 45 MIN: CPT

## 2022-02-28 RX ORDER — NORETHINDRONE AND ETHINYL ESTRADIOL 0.5-0.035
0.5-35 KIT ORAL
Refills: 0 | Status: ACTIVE | COMMUNITY

## 2022-02-28 RX ORDER — ADAPALENE 1 MG/G
0.1 GEL TOPICAL
Qty: 1 | Refills: 3 | Status: DISCONTINUED | COMMUNITY
Start: 2021-01-08 | End: 2022-02-28

## 2022-02-28 RX ORDER — BISACODYL 5 MG/1
5 TABLET, DELAYED RELEASE ORAL
Refills: 0 | Status: DISCONTINUED | COMMUNITY
End: 2022-02-28

## 2022-02-28 RX ORDER — CLINDAMYCIN PHOSPHATE 10 MG/ML
1 LOTION TOPICAL
Qty: 1 | Refills: 10 | Status: DISCONTINUED | COMMUNITY
Start: 2019-03-14 | End: 2022-02-28

## 2022-02-28 RX ORDER — DEXTROAMPHETAMINE SACCHARATE, AMPHETAMINE ASPARTATE, DEXTROAMPHETAMINE SULFATE, AND AMPHETAMINE SULFATE 3.75; 3.75; 3.75; 3.75 MG/1; MG/1; MG/1; MG/1
15 TABLET ORAL
Refills: 0 | Status: ACTIVE | COMMUNITY

## 2022-02-28 RX ORDER — DOXYCYCLINE 100 MG/1
100 CAPSULE ORAL
Qty: 60 | Refills: 0 | Status: DISCONTINUED | COMMUNITY
Start: 2020-08-07 | End: 2022-02-28

## 2022-02-28 RX ORDER — TOLTERODINE TARTRATE 4 MG/1
4 CAPSULE, EXTENDED RELEASE ORAL
Refills: 0 | Status: ACTIVE | COMMUNITY

## 2022-02-28 RX ORDER — GABAPENTIN 300 MG/1
300 CAPSULE ORAL
Refills: 0 | Status: ACTIVE | COMMUNITY

## 2022-02-28 RX ORDER — PERPHENAZINE 8 MG
TABLET ORAL
Refills: 0 | Status: ACTIVE | COMMUNITY

## 2022-02-28 RX ORDER — UBIDECARENONE/VIT E ACET 100MG-5
1000 CAPSULE ORAL
Refills: 0 | Status: DISCONTINUED | COMMUNITY
End: 2022-02-28

## 2022-02-28 RX ORDER — HALOBETASOL PROPIONATE 0.5 MG/G
0.05 OINTMENT TOPICAL
Qty: 1 | Refills: 1 | Status: DISCONTINUED | COMMUNITY
Start: 2020-08-10 | End: 2022-02-28

## 2022-02-28 RX ORDER — ERYTHROMYCIN 20 MG/G
2 GEL TOPICAL TWICE DAILY
Qty: 1 | Refills: 3 | Status: DISCONTINUED | COMMUNITY
Start: 2020-08-12 | End: 2022-02-28

## 2022-02-28 RX ORDER — DOXYCYCLINE HYCLATE 100 MG/1
100 TABLET ORAL
Qty: 60 | Refills: 0 | Status: DISCONTINUED | COMMUNITY
Start: 2020-06-30 | End: 2022-02-28

## 2022-02-28 RX ORDER — TRIAMCINOLONE ACETONIDE 1 MG/G
0.1 OINTMENT TOPICAL
Qty: 1 | Refills: 0 | Status: DISCONTINUED | COMMUNITY
Start: 2020-08-07 | End: 2022-02-28

## 2022-03-01 PROBLEM — R11.0 NAUSEA: Status: ACTIVE | Noted: 2022-03-01

## 2022-03-01 NOTE — ASSESSMENT
[FreeTextEntry1] : Patient with longstanding symptoms who now has been having episodes of abdominal pain about twice a month that appear to be related to her chronic constipation.  This is most consistent with IBS-C.  She has had negative EGD and colonoscopy within the past year.\par \par I have started the patient on Amitiza 8 mcg twice daily.\par \par Bloodwork was sent for CBC, chem-pack, TSH, celiac markers.\par \par The patient will return to see me in 1 month to assess her response.

## 2022-03-01 NOTE — HISTORY OF PRESENT ILLNESS
[FreeTextEntry1] : The patient is a 26-year-old woman who states that she has had GI problems dating back to high school.  At that time, she was vomiting almost every other day in the evening.  She had an EGD on September 15, 2017 which was reportedly normal.  Her vomiting symptoms have improved and she has only vomited once in the past 3 months.  She now complains of generalized abdominal pain for the past approximately 1 year.  She gets episodes about twice a month that last up to 2 days at a time and are relieved by passing a large bowel movement.  The symptoms come on suddenly.  She feels that there is no relationship to eating, certain foods, or time of day.  She feels well otherwise.  She states that the episodes of pain were more frequent but are now less frequent on peppermint oil daily.  She tried dicyclomine without help.  She denies heartburn or dysphagia.  She does have nausea.  She moves her bowels once every 3 days.  She denies melena or bright red blood per rectum.  She tried using MiraLAX and found that she had more episodes of pain.\par \par The patient has been seeing another gastroenterologist and had EGD and colonoscopy on September 13, 2021.  EGD was significant for gross evidence of gastritis but biopsies from the antrum, body, and duodenum were negative.  Colonoscopy revealed mild erythema in the sigmoid but biopsies were negative.  The patient had a CT scan of the abdomen and pelvis on July 12, 2021 which showed moderate colonic fecal retention.  On further questioning, the patient states that she was in the psychiatric center in 2016 at which time she was on Amitiza for 1 to 2 years and felt well.  The patient has not been admitted to the hospital in the past year and denies any cardiac issues.

## 2022-03-01 NOTE — CONSULT LETTER
[FreeTextEntry1] : Dear Dr. Mis Forbes,\[facility] \[facility] I had the pleasure of seeing your patient SUSI COOPER in the office today.  My office note is attached. PLEASE READ THE "ASSESSMENT" SECTION OF THE NOTE TO SEE MY IMPRESSION AND PLAN.\par \[facility] Thank you very much for allowing me to participate in the care of your patient.\[facility] \[facility] Sincerely,\[facility] \[facility] Preston Lucas M.D., FAC, [facility] Director, Celiac Program at [facility]  of Medicine\[facility] [employer] at [facility] Practice Director,[facility] - Gastroenterology/Internal Medicine at [place]\[facility] [address]\[place], [place] [address] Tel: [phone]\[facility] Email: [email or web address] \[facility] \[facility] The attached note has been created using a voice recognition system (Dragon).  There may be some misspellings and typos.  Please call my office if you have any issues or questions.

## 2022-03-01 NOTE — REASON FOR VISIT
[Initial Evaluation] : an initial evaluation [FreeTextEntry1] : Abdominal pain, constipation, nausea

## 2022-03-03 LAB
ALBUMIN SERPL ELPH-MCNC: 4.7 G/DL
ALP BLD-CCNC: 69 U/L
ALT SERPL-CCNC: 17 U/L
ANION GAP SERPL CALC-SCNC: 16 MMOL/L
AST SERPL-CCNC: 20 U/L
BASOPHILS # BLD AUTO: 0.05 K/UL
BASOPHILS NFR BLD AUTO: 0.7 %
BILIRUB SERPL-MCNC: 0.4 MG/DL
BUN SERPL-MCNC: 9 MG/DL
CALCIUM SERPL-MCNC: 9.9 MG/DL
CHLORIDE SERPL-SCNC: 103 MMOL/L
CO2 SERPL-SCNC: 20 MMOL/L
CREAT SERPL-MCNC: 0.66 MG/DL
EGFR: 124 ML/MIN/1.73M2
ENDOMYSIUM IGA SER QL: NEGATIVE
ENDOMYSIUM IGA TITR SER: NORMAL
EOSINOPHIL # BLD AUTO: 0.1 K/UL
EOSINOPHIL NFR BLD AUTO: 1.3 %
GGT SERPL-CCNC: 16 U/L
GLIADIN IGA SER QL: <5 UNITS
GLIADIN IGG SER QL: <5 UNITS
GLIADIN PEPTIDE IGA SER-ACNC: NEGATIVE
GLIADIN PEPTIDE IGG SER-ACNC: NEGATIVE
GLUCOSE SERPL-MCNC: 92 MG/DL
HCT VFR BLD CALC: 40.7 %
HGB BLD-MCNC: 14.2 G/DL
IGA SER QL IEP: 126 MG/DL
IMM GRANULOCYTES NFR BLD AUTO: 0.1 %
LYMPHOCYTES # BLD AUTO: 2.92 K/UL
LYMPHOCYTES NFR BLD AUTO: 38.5 %
MAN DIFF?: NORMAL
MCHC RBC-ENTMCNC: 31.8 PG
MCHC RBC-ENTMCNC: 34.9 GM/DL
MCV RBC AUTO: 91.3 FL
MONOCYTES # BLD AUTO: 0.39 K/UL
MONOCYTES NFR BLD AUTO: 5.1 %
NEUTROPHILS # BLD AUTO: 4.11 K/UL
NEUTROPHILS NFR BLD AUTO: 54.3 %
PLATELET # BLD AUTO: 310 K/UL
POTASSIUM SERPL-SCNC: 4.3 MMOL/L
PROT SERPL-MCNC: 7.5 G/DL
RBC # BLD: 4.46 M/UL
RBC # FLD: 13.2 %
SODIUM SERPL-SCNC: 139 MMOL/L
TSH SERPL-ACNC: 0.94 UIU/ML
TTG IGA SER IA-ACNC: <1.2 U/ML
TTG IGA SER-ACNC: NEGATIVE
TTG IGG SER IA-ACNC: 3.6 U/ML
TTG IGG SER IA-ACNC: NEGATIVE
WBC # FLD AUTO: 7.58 K/UL

## 2022-03-04 ENCOUNTER — NON-APPOINTMENT (OUTPATIENT)
Age: 27
End: 2022-03-04

## 2022-04-14 ENCOUNTER — APPOINTMENT (OUTPATIENT)
Dept: GASTROENTEROLOGY | Facility: CLINIC | Age: 27
End: 2022-04-14
Payer: MEDICAID

## 2022-04-14 VITALS
HEART RATE: 108 BPM | OXYGEN SATURATION: 96 % | DIASTOLIC BLOOD PRESSURE: 60 MMHG | BODY MASS INDEX: 26.63 KG/M2 | HEIGHT: 64 IN | WEIGHT: 156 LBS | TEMPERATURE: 97.7 F | SYSTOLIC BLOOD PRESSURE: 90 MMHG

## 2022-04-14 DIAGNOSIS — K59.00 CONSTIPATION, UNSPECIFIED: ICD-10-CM

## 2022-04-14 DIAGNOSIS — R10.9 UNSPECIFIED ABDOMINAL PAIN: ICD-10-CM

## 2022-04-14 PROCEDURE — 99214 OFFICE O/P EST MOD 30 MIN: CPT

## 2022-04-17 NOTE — CONSULT LETTER
[FreeTextEntry1] : Dear Dr. Mis Forbes,\[facility] \[facility] I had the pleasure of seeing your patient SUSI COOPER in the office today.  My office note is attached. PLEASE READ THE "ASSESSMENT" SECTION OF THE NOTE TO SEE MY IMPRESSION AND PLAN.\par \[facility] Thank you very much for allowing me to participate in the care of your patient.\[facility] \[facility] Sincerely,\[facility] \[facility] Preston Lucas M.D., FAC, [facility] Director, Celiac Program at [facility]  of Medicine\[facility] [employer] at [facility] Practice Director,[facility] - Gastroenterology/Internal Medicine at [place]\[facility] [address]\[place], [place] [address] Tel: [phone]\[facility] Email: [email or web address] \[facility] \[facility] The attached note has been created using a voice recognition system (Dragon).  There may be some misspellings and typos.  Please call my office if you have any issues or questions.

## 2022-04-17 NOTE — ASSESSMENT
[FreeTextEntry1] : Patient with constipation and episodic lower abdominal pain, consistent with IBS-C.  The pain appears better with Amitiza 8 mcg twice daily although the patient is still moving her bowels every 1 to 2 days.  She is concerned that Amitiza is causing her to gain weight.\par \par I had a discussion with the patient regarding the Amitiza/lubiprostone.  Weight gain is not an expected finding.\par \par We will try increasing lubiprostone to 24 mcg twice daily to assess her response.  We will also follow her weight over this period.\par \par Patient was sent for an abdominal and pelvic ultrasound.\par \par Patient will return to see me in 6 weeks.\par \par \par Plan from 2/28/2022 - Patient with longstanding symptoms who now has been having episodes of abdominal pain about twice a month that appear to be related to her chronic constipation.  This is most consistent with IBS-C.  She has had negative EGD and colonoscopy within the past year.\par \par I have started the patient on Amitiza 8 mcg twice daily.\par \par Bloodwork was sent for CBC, chem-pack, TSH, celiac markers.\par \par The patient will return to see me in 1 month to assess her response.

## 2022-04-17 NOTE — HISTORY OF PRESENT ILLNESS
[FreeTextEntry1] : We reviewed the patient's blood work from her last visit on February 28, 2022.  The labs were normal.  The patient has been on Amitiza 8 mcg twice daily and has moved her bowels every 1 to 2 days.  She has had 2 episodes of pain since then, one on March 11 that lasted 3 hours and one on April 6 that lasted about 2 hours and resolved without passage of a bowel movement.  The patient has no vomiting.  Of note, the patient has gained 5 pounds, which she is concerned is related to the use of the Amitiza.\par \par \par Note from 2/28/2022 - The patient is a 26-year-old woman who states that she has had GI problems dating back to high school.  At that time, she was vomiting almost every other day in the evening.  She had an EGD on September 15, 2017 which was reportedly normal.  Her vomiting symptoms have improved and she has only vomited once in the past 3 months.  She now complains of generalized abdominal pain for the past approximately 1 year.  She gets episodes about twice a month that last up to 2 days at a time and are relieved by passing a large bowel movement.  The symptoms come on suddenly.  She feels that there is no relationship to eating, certain foods, or time of day.  She feels well otherwise.  She states that the episodes of pain were more frequent but are now less frequent on peppermint oil daily.  She tried dicyclomine without help.  She denies heartburn or dysphagia.  She does have nausea.  She moves her bowels once every 3 days.  She denies melena or bright red blood per rectum.  She tried using MiraLAX and found that she had more episodes of pain.\par \par The patient has been seeing another gastroenterologist and had EGD and colonoscopy on September 13, 2021.  EGD was significant for gross evidence of gastritis but biopsies from the antrum, body, and duodenum were negative.  Colonoscopy revealed mild erythema in the sigmoid but biopsies were negative.  The patient had a CT scan of the abdomen and pelvis on July 12, 2021 which showed moderate colonic fecal retention.  On further questioning, the patient states that she was in the psychiatric center in 2016 at which time she was on Amitiza for 1 to 2 years and felt well.  The patient has not been admitted to the hospital in the past year and denies any cardiac issues.

## 2022-06-01 ENCOUNTER — APPOINTMENT (OUTPATIENT)
Dept: GASTROENTEROLOGY | Facility: CLINIC | Age: 27
End: 2022-06-01
Payer: MEDICAID

## 2022-06-01 VITALS
HEART RATE: 95 BPM | OXYGEN SATURATION: 99 % | BODY MASS INDEX: 26.98 KG/M2 | HEIGHT: 64 IN | SYSTOLIC BLOOD PRESSURE: 84 MMHG | TEMPERATURE: 97.5 F | DIASTOLIC BLOOD PRESSURE: 58 MMHG | WEIGHT: 158 LBS

## 2022-06-01 DIAGNOSIS — R10.13 EPIGASTRIC PAIN: ICD-10-CM

## 2022-06-01 DIAGNOSIS — K58.1 IRRITABLE BOWEL SYNDROME WITH CONSTIPATION: ICD-10-CM

## 2022-06-01 PROCEDURE — 99214 OFFICE O/P EST MOD 30 MIN: CPT

## 2022-06-01 NOTE — ASSESSMENT
[FreeTextEntry1] : Patient with IBS-C who is doing well on Amitiza 24 mcg twice daily.  She no longer has lower abdominal pain.  She now has 2 days of postprandial epigastric pain.  The patient had a negative EGD performed by another physician within the past year.  She also had a negative abdominal ultrasound recently.\par \par Patient will continue Amitiza 24 mcg twice daily.\par \par I advised that we observe the epigastric pain to see if it continues or resolves.  The patient was advised that she can use Pepcid OTC as needed.  She was advised to call me if the symptoms persist for more than 2 weeks or worsen, in which case the patient will need an EGD.  Otherwise, the patient will return to see me in 1 year or sooner if needed.\par \par \par Plan from 4/14/2022 - Patient with constipation and episodic lower abdominal pain, consistent with IBS-C.  The pain appears better with Amitiza 8 mcg twice daily although the patient is still moving her bowels every 1 to 2 days.  She is concerned that Amitiza is causing her to gain weight.\par \par I had a discussion with the patient regarding the Amitiza/lubiprostone.  Weight gain is not an expected finding.\par \par We will try increasing lubiprostone to 24 mcg twice daily to assess her response.  We will also follow her weight over this period.\par \par Patient was sent for an abdominal and pelvic ultrasound.\par \par Patient will return to see me in 6 weeks.\par \par \par Plan from 2/28/2022 - Patient with longstanding symptoms who now has been having episodes of abdominal pain about twice a month that appear to be related to her chronic constipation.  This is most consistent with IBS-C.  She has had negative EGD and colonoscopy within the past year.\par \par I have started the patient on Amitiza 8 mcg twice daily.\par \par Bloodwork was sent for CBC, chem-pack, TSH, celiac markers.\par \par The patient will return to see me in 1 month to assess her response.

## 2022-06-01 NOTE — HISTORY OF PRESENT ILLNESS
[FreeTextEntry1] : Patient has a history of IBS-C with constipation and episodic lower abdominal pain.  She is on Amitiza 24 mcg twice daily and is feeling well in this regard.  She reports a bowel movement every other day.  She has had no further lower abdominal pain.  She had an ultrasound of the abdomen and pelvis on April 26, 2022 which was negative although the pelvic portion was limited.  The patient now complains of 2 days of postprandial epigastric pain that began after eating a hamburger.  She states that she gets squeezing pain that lasts about an hour but is tolerable since.  She denies heartburn or dysphagia.  The patient had a negative EGD performed by another physician within the past year.\par \par \par Note from 4/14/2022 - We reviewed the patient's blood work from her last visit on February 28, 2022.  The labs were normal.  The patient has been on Amitiza 8 mcg twice daily and has moved her bowels every 1 to 2 days.  She has had 2 episodes of pain since then, one on March 11 that lasted 3 hours and one on April 6 that lasted about 2 hours and resolved without passage of a bowel movement.  The patient has no vomiting.  Of note, the patient has gained 5 pounds, which she is concerned is related to the use of the Amitiza.\par \par \par Note from 2/28/2022 - The patient is a 26-year-old woman who states that she has had GI problems dating back to high school.  At that time, she was vomiting almost every other day in the evening.  She had an EGD on September 15, 2017 which was reportedly normal.  Her vomiting symptoms have improved and she has only vomited once in the past 3 months.  She now complains of generalized abdominal pain for the past approximately 1 year.  She gets episodes about twice a month that last up to 2 days at a time and are relieved by passing a large bowel movement.  The symptoms come on suddenly.  She feels that there is no relationship to eating, certain foods, or time of day.  She feels well otherwise.  She states that the episodes of pain were more frequent but are now less frequent on peppermint oil daily.  She tried dicyclomine without help.  She denies heartburn or dysphagia.  She does have nausea.  She moves her bowels once every 3 days.  She denies melena or bright red blood per rectum.  She tried using MiraLAX and found that she had more episodes of pain.\par \par The patient has been seeing another gastroenterologist and had EGD and colonoscopy on September 13, 2021.  EGD was significant for gross evidence of gastritis but biopsies from the antrum, body, and duodenum were negative.  Colonoscopy revealed mild erythema in the sigmoid but biopsies were negative.  The patient had a CT scan of the abdomen and pelvis on July 12, 2021 which showed moderate colonic fecal retention.  On further questioning, the patient states that she was in the psychiatric center in 2016 at which time she was on Amitiza for 1 to 2 years and felt well.  The patient has not been admitted to the hospital in the past year and denies any cardiac issues.

## 2022-06-01 NOTE — CONSULT LETTER
[FreeTextEntry1] : Dear Dr. Mis Forbes,\[facility] \[facility] I had the pleasure of seeing your patient SUSI COOPER in the office today.  My office note is attached. PLEASE READ THE "ASSESSMENT" SECTION OF THE NOTE TO SEE MY IMPRESSION AND PLAN.\par \[facility] Thank you very much for allowing me to participate in the care of your patient.\[facility] \[facility] Sincerely,\[facility] \[facility] Preston Lucas M.D., FAC, [facility] Director, Celiac Program at [facility]  of Medicine\[facility] [employer] at [facility] Practice Director,[facility] - Gastroenterology/Internal Medicine at [place]\[facility] [address]\[place], [place] [address] Tel: [phone]\[facility] Email: [email or web address] \[facility] \[facility] The attached note has been created using a voice recognition system (Dragon).  There may be some misspellings and typos.  Please call my office if you have any issues or questions.

## 2022-08-25 ENCOUNTER — APPOINTMENT (OUTPATIENT)
Dept: DERMATOLOGY | Facility: CLINIC | Age: 27
End: 2022-08-25

## 2022-08-25 DIAGNOSIS — L91.0 HYPERTROPHIC SCAR: ICD-10-CM

## 2022-08-25 PROCEDURE — 99214 OFFICE O/P EST MOD 30 MIN: CPT

## 2022-08-25 RX ORDER — CLINDAMYCIN PHOSPHATE 10 MG/ML
1 LOTION TOPICAL
Qty: 1 | Refills: 3 | Status: ACTIVE | COMMUNITY
Start: 2022-08-25 | End: 1900-01-01

## 2022-08-25 NOTE — HISTORY OF PRESENT ILLNESS
[FreeTextEntry1] : acne [de-identified] : 27 year old female here for acne. using salicylic acid wash, BP wash, and topical dapsone. feels she is sticky from the dapsone even though it works.

## 2022-08-25 NOTE — ASSESSMENT
[FreeTextEntry1] : acne\par c/w salicylic acid wash; SED\par stop BP wash; SED\par c/w topical dapsone; SED\par add clinda lotion qAM; SED\par \par f.u 6 months

## 2022-09-27 ENCOUNTER — RX RENEWAL (OUTPATIENT)
Age: 27
End: 2022-09-27

## 2022-09-27 RX ORDER — LUBIPROSTONE 24 UG/1
24 CAPSULE ORAL TWICE DAILY
Qty: 180 | Refills: 1 | Status: ACTIVE | COMMUNITY
Start: 2022-02-28 | End: 1900-01-01

## 2022-11-17 ENCOUNTER — APPOINTMENT (OUTPATIENT)
Dept: GASTROENTEROLOGY | Facility: CLINIC | Age: 27
End: 2022-11-17

## 2022-12-21 NOTE — PROGRESS NOTE ADULT - PROBLEM SELECTOR PROBLEM 3
Detail Level: Detailed Quality 110: Preventive Care And Screening: Influenza Immunization: Influenza immunization was not ordered or administered, reason not given Quality 111:Pneumonia Vaccination Status For Older Adults: Documentation of medical reason(s) for not administering pneumococcal vaccine (e.g., adverse reaction to vaccine) UTI (urinary tract infection)

## 2023-06-13 ENCOUNTER — APPOINTMENT (OUTPATIENT)
Dept: DERMATOLOGY | Facility: CLINIC | Age: 28
End: 2023-06-13
Payer: COMMERCIAL

## 2023-06-13 DIAGNOSIS — L70.0 ACNE VULGARIS: ICD-10-CM

## 2023-06-13 DIAGNOSIS — L24.9 IRRITANT CONTACT DERMATITIS, UNSPECIFIED CAUSE: ICD-10-CM

## 2023-06-13 PROCEDURE — 99214 OFFICE O/P EST MOD 30 MIN: CPT

## 2023-06-13 RX ORDER — DAPSONE 50 MG/G
5 GEL TOPICAL
Qty: 3 | Refills: 3 | Status: ACTIVE | COMMUNITY
Start: 2018-02-27 | End: 1900-01-01

## 2023-06-13 NOTE — HISTORY OF PRESENT ILLNESS
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 PCP gave mometasone cream with good control\par

## 2023-06-13 NOTE — ASSESSMENT
[FreeTextEntry1] : 1. Acne, mild comedonal and inflammatory\par pt happy with topical regimen\par - continue dapsone topically 1-2x/day\par - continue otc salicylic acid wash\par - reviewed other topical options if skin were to flare\par \par 2. Mild hand eczema - suspect irritant contact dermatitis\par - chronic with intermittent flares\par - mometasone cream when active. Side effects of long term use of topical steroids discussed with patient including but not limited to thinning of the skin, atrophy and dyspigmentation.\par discussed frequent use of topical steroids when skin flaring and moisturizers when no longer active. if no improvement in 3-4 weeks/rapid recurrence when stopping topical steroids, recommend re-evaluation.\par - recommend using emollients frequently\par \par RTC prn

## 2023-07-31 ENCOUNTER — NON-APPOINTMENT (OUTPATIENT)
Age: 28
End: 2023-07-31

## 2023-08-01 ENCOUNTER — EMERGENCY (EMERGENCY)
Facility: HOSPITAL | Age: 28
LOS: 1 days | Discharge: HOME | End: 2023-08-01
Admitting: EMERGENCY MEDICINE
Payer: COMMERCIAL

## 2023-08-01 VITALS
RESPIRATION RATE: 18 BRPM | HEART RATE: 91 BPM | TEMPERATURE: 98 F | DIASTOLIC BLOOD PRESSURE: 75 MMHG | SYSTOLIC BLOOD PRESSURE: 105 MMHG | OXYGEN SATURATION: 100 %

## 2023-08-01 VITALS
RESPIRATION RATE: 16 BRPM | HEART RATE: 73 BPM | SYSTOLIC BLOOD PRESSURE: 100 MMHG | TEMPERATURE: 98 F | OXYGEN SATURATION: 98 % | DIASTOLIC BLOOD PRESSURE: 72 MMHG

## 2023-08-01 DIAGNOSIS — Z90.49 ACQUIRED ABSENCE OF OTHER SPECIFIED PARTS OF DIGESTIVE TRACT: Chronic | ICD-10-CM

## 2023-08-01 LAB
ALBUMIN SERPL ELPH-MCNC: 4.2 G/DL — SIGNIFICANT CHANGE UP (ref 3.3–5)
ALP SERPL-CCNC: 64 U/L — SIGNIFICANT CHANGE UP (ref 40–120)
ALT FLD-CCNC: 13 U/L — SIGNIFICANT CHANGE UP (ref 4–33)
ANION GAP SERPL CALC-SCNC: 12 MMOL/L — SIGNIFICANT CHANGE UP (ref 7–14)
AST SERPL-CCNC: 15 U/L — SIGNIFICANT CHANGE UP (ref 4–32)
BASOPHILS # BLD AUTO: 0.04 K/UL — SIGNIFICANT CHANGE UP (ref 0–0.2)
BASOPHILS NFR BLD AUTO: 0.5 % — SIGNIFICANT CHANGE UP (ref 0–2)
BILIRUB SERPL-MCNC: 0.2 MG/DL — SIGNIFICANT CHANGE UP (ref 0.2–1.2)
BUN SERPL-MCNC: 10 MG/DL — SIGNIFICANT CHANGE UP (ref 7–23)
CALCIUM SERPL-MCNC: 9.2 MG/DL — SIGNIFICANT CHANGE UP (ref 8.4–10.5)
CHLORIDE SERPL-SCNC: 105 MMOL/L — SIGNIFICANT CHANGE UP (ref 98–107)
CO2 SERPL-SCNC: 23 MMOL/L — SIGNIFICANT CHANGE UP (ref 22–31)
CREAT SERPL-MCNC: 0.66 MG/DL — SIGNIFICANT CHANGE UP (ref 0.5–1.3)
EGFR: 122 ML/MIN/1.73M2 — SIGNIFICANT CHANGE UP
EOSINOPHIL # BLD AUTO: 0.07 K/UL — SIGNIFICANT CHANGE UP (ref 0–0.5)
EOSINOPHIL NFR BLD AUTO: 0.8 % — SIGNIFICANT CHANGE UP (ref 0–6)
GLUCOSE SERPL-MCNC: 87 MG/DL — SIGNIFICANT CHANGE UP (ref 70–99)
HCT VFR BLD CALC: 39.6 % — SIGNIFICANT CHANGE UP (ref 34.5–45)
HGB BLD-MCNC: 14 G/DL — SIGNIFICANT CHANGE UP (ref 11.5–15.5)
IANC: 3.23 K/UL — SIGNIFICANT CHANGE UP (ref 1.8–7.4)
IMM GRANULOCYTES NFR BLD AUTO: 0.2 % — SIGNIFICANT CHANGE UP (ref 0–0.9)
LITHIUM SERPL-MCNC: 0.3 MMOL/L — LOW (ref 0.6–1.2)
LYMPHOCYTES # BLD AUTO: 4.47 K/UL — HIGH (ref 1–3.3)
LYMPHOCYTES # BLD AUTO: 53.8 % — HIGH (ref 13–44)
MCHC RBC-ENTMCNC: 31.6 PG — SIGNIFICANT CHANGE UP (ref 27–34)
MCHC RBC-ENTMCNC: 35.4 GM/DL — SIGNIFICANT CHANGE UP (ref 32–36)
MCV RBC AUTO: 89.4 FL — SIGNIFICANT CHANGE UP (ref 80–100)
MONOCYTES # BLD AUTO: 0.48 K/UL — SIGNIFICANT CHANGE UP (ref 0–0.9)
MONOCYTES NFR BLD AUTO: 5.8 % — SIGNIFICANT CHANGE UP (ref 2–14)
NEUTROPHILS # BLD AUTO: 3.23 K/UL — SIGNIFICANT CHANGE UP (ref 1.8–7.4)
NEUTROPHILS NFR BLD AUTO: 38.9 % — LOW (ref 43–77)
NRBC # BLD: 0 /100 WBCS — SIGNIFICANT CHANGE UP (ref 0–0)
NRBC # FLD: 0 K/UL — SIGNIFICANT CHANGE UP (ref 0–0)
PLATELET # BLD AUTO: 309 K/UL — SIGNIFICANT CHANGE UP (ref 150–400)
POTASSIUM SERPL-MCNC: 3.9 MMOL/L — SIGNIFICANT CHANGE UP (ref 3.5–5.3)
POTASSIUM SERPL-SCNC: 3.9 MMOL/L — SIGNIFICANT CHANGE UP (ref 3.5–5.3)
PROT SERPL-MCNC: 7.6 G/DL — SIGNIFICANT CHANGE UP (ref 6–8.3)
RBC # BLD: 4.43 M/UL — SIGNIFICANT CHANGE UP (ref 3.8–5.2)
RBC # FLD: 12.9 % — SIGNIFICANT CHANGE UP (ref 10.3–14.5)
SODIUM SERPL-SCNC: 140 MMOL/L — SIGNIFICANT CHANGE UP (ref 135–145)
TROPONIN T, HIGH SENSITIVITY RESULT: 8 NG/L — SIGNIFICANT CHANGE UP
TSH SERPL-MCNC: 2.4 UIU/ML — SIGNIFICANT CHANGE UP (ref 0.27–4.2)
WBC # BLD: 8.31 K/UL — SIGNIFICANT CHANGE UP (ref 3.8–10.5)
WBC # FLD AUTO: 8.31 K/UL — SIGNIFICANT CHANGE UP (ref 3.8–10.5)

## 2023-08-01 PROCEDURE — 93010 ELECTROCARDIOGRAM REPORT: CPT

## 2023-08-01 PROCEDURE — 99285 EMERGENCY DEPT VISIT HI MDM: CPT

## 2023-08-01 PROCEDURE — 71046 X-RAY EXAM CHEST 2 VIEWS: CPT | Mod: 26

## 2023-08-01 NOTE — ED PROVIDER NOTE - CLINICAL SUMMARY MEDICAL DECISION MAKING FREE TEXT BOX
20-year-old female PMH of HLD, bipolar disorder on lithium, presenting to ED complaining of nonpleuritic, nonexertional, midsternal chest pain, intermittent, transient episodes approximately 5 minutes at a time on and off, 6 times a day approximately, squeezing/tight sensation associated with shortness of breath/palpitations. admits to increasing anxiety over past week as well. no SI/HI.  Patient states the symptoms have been occurring over the last week.  No associated nausea, diaphoresis, dizziness.  No prior cardiac work-ups.  No family history of cardiac disease. no fevers, cough, sore throat, n/v/d/c, abd pain, weakness.    will check trop to r/o atypical ACS, CXR, TSH  lithium level  ECG NSR nonischemic TWI V3, c/w prior ECG

## 2023-08-01 NOTE — ED ADULT TRIAGE NOTE - CHIEF COMPLAINT QUOTE
Pt presents to ED ambulatory from home with c/o central chest pain x 1 week. Pt endorses pain is intermittent and reports palpitations as well. Pt was evaluated at urgent care and advised to come to ED for further eval. Pt has hx of HLD and bipolar.

## 2023-08-01 NOTE — ED PROVIDER NOTE - PATIENT PORTAL LINK FT
You can access the FollowMyHealth Patient Portal offered by [facility] by registering at the following website: [email or web address]. By joining [employer]’s FollowMyHealth portal, you will also be able to view your health information using other applications (apps) compatible with our system.

## 2023-08-01 NOTE — ED ADULT NURSE NOTE - NSFALLUNIVINTERV_ED_ALL_ED
Bed/Stretcher in lowest position, wheels locked, appropriate side rails in place/Call bell, personal items and telephone in reach/Instruct patient to call for assistance before getting out of bed/chair/stretcher/Non-slip footwear applied when patient is off stretcher/[place] to call system/Physically safe environment - no spills, clutter or unnecessary equipment/Purposeful proactive rounding/Room/bathroom lighting operational, light cord in reach

## 2023-08-01 NOTE — ED PROVIDER NOTE - OBJECTIVE STATEMENT
20-year-old female PMH of HLD, bipolar disorder on lithium, presenting to ED complaining of nonpleuritic, nonexertional, midsternal chest pain, intermittent, transient episodes approximately 5 minutes at a time on and off, 6 times a day approximately, squeezing/tight sensation associated with shortness of breath.  Patient states the symptoms have been occurring over the last week.  No associated nausea, diaphoresis, dizziness.  No prior cardiac work-ups.  No family history of cardiac disease. no fevers, cough, sore throat, n/v/d/c, abd pain, weakness.

## 2023-08-01 NOTE — ED ADULT NURSE NOTE - OBJECTIVE STATEMENT
Received A and Ox 3 in NAD resting comfortably, c/o midsternal chest pain, denies SOB, FINK reports onset of symptoms for 1 week, breathing is even and unlabored,  communicates with full and complete sentences, no LE edema noted, IV initiated labs drawn and sent.

## 2023-08-01 NOTE — ED PROVIDER NOTE - PROGRESS NOTE DETAILS
ERASTO Fonseca: received sign out on patient pending lab results.  labs wnl, lithium was noted to be subtherapeutic patient states is aware and will speak to psychiatrist as she has been on the same dose for years.  Patient states is feeling better and things sx were due to her anxiety,  will dc home with strict return precautions.

## 2024-08-14 NOTE — ED PROVIDER NOTE - PHYSICAL EXAMINATION
CONSTITUTIONAL: Well-appearing; well-nourished; in no apparent distress;  HEAD: Normocephalic, atraumatic;  NECK/LYMPH: Supple  CARD: Normal S1, S2; no murmurs, rubs, or gallops noted  RESP: Normal chest excursion with respiration; breath sounds clear and equal bilaterally; no wheezes, rhonchi, or rales noted  EXT/MS: moves all extremities; distal pulses are normal, no pedal edema  SKIN: Normal for age and race; warm; dry; good turgor; no apparent lesions or exudate noted  NEURO: Awake, alert, oriented x 3, no gross deficits  PSYCH: Normal mood; appropriate affect Attending with

## 2024-09-22 ENCOUNTER — NON-APPOINTMENT (OUTPATIENT)
Age: 29
End: 2024-09-22

## 2024-10-17 ENCOUNTER — APPOINTMENT (OUTPATIENT)
Dept: ORTHOPEDIC SURGERY | Facility: CLINIC | Age: 29
End: 2024-10-17

## 2024-10-17 VITALS — BODY MASS INDEX: 27.31 KG/M2 | WEIGHT: 160 LBS | HEIGHT: 64 IN

## 2024-10-17 DIAGNOSIS — M40.204 UNSPECIFIED KYPHOSIS, THORACIC REGION: ICD-10-CM

## 2024-10-17 PROCEDURE — 99203 OFFICE O/P NEW LOW 30 MIN: CPT

## 2025-02-04 NOTE — ED PROVIDER NOTE - CROS ED PSYCH ALL NEG
[facility] OT notes. Paperwork to be reviewed and signed. Last C 8/02/24 with [facility]. Paperwork placed at [facility]'s desk at [facility]. Routed to [facility].   
Completed [facility] fax bin  
- - -

## 2025-02-25 ENCOUNTER — APPOINTMENT (OUTPATIENT)
Dept: DERMATOLOGY | Facility: CLINIC | Age: 30
End: 2025-02-25
Payer: COMMERCIAL

## 2025-02-25 DIAGNOSIS — R23.8 OTHER SKIN CHANGES: ICD-10-CM

## 2025-02-25 DIAGNOSIS — L30.9 DERMATITIS, UNSPECIFIED: ICD-10-CM

## 2025-02-25 DIAGNOSIS — L70.0 ACNE VULGARIS: ICD-10-CM

## 2025-02-25 PROCEDURE — 99214 OFFICE O/P EST MOD 30 MIN: CPT

## 2025-02-25 RX ORDER — MOMETASONE FUROATE 1 MG/G
0.1 CREAM TOPICAL
Qty: 1 | Refills: 3 | Status: ACTIVE | COMMUNITY
Start: 2025-02-25 | End: 1900-01-01